# Patient Record
Sex: FEMALE | Race: BLACK OR AFRICAN AMERICAN | Employment: FULL TIME | URBAN - METROPOLITAN AREA
[De-identification: names, ages, dates, MRNs, and addresses within clinical notes are randomized per-mention and may not be internally consistent; named-entity substitution may affect disease eponyms.]

---

## 2017-06-29 ENCOUNTER — HOSPITAL ENCOUNTER (EMERGENCY)
Age: 32
Discharge: HOME | End: 2017-06-29
Attending: EMERGENCY MEDICINE | Admitting: EMERGENCY MEDICINE
Payer: MEDICAID

## 2017-06-29 ENCOUNTER — APPOINTMENT (OUTPATIENT)
Dept: GENERAL RADIOLOGY | Age: 32
End: 2017-06-29
Attending: EMERGENCY MEDICINE
Payer: MEDICAID

## 2017-06-29 VITALS
SYSTOLIC BLOOD PRESSURE: 160 MMHG | DIASTOLIC BLOOD PRESSURE: 92 MMHG | RESPIRATION RATE: 13 BRPM | HEIGHT: 65 IN | OXYGEN SATURATION: 100 % | TEMPERATURE: 98.1 F | WEIGHT: 187 LBS | HEART RATE: 71 BPM | BODY MASS INDEX: 31.16 KG/M2

## 2017-06-29 DIAGNOSIS — R07.9 ACUTE CHEST PAIN: Primary | ICD-10-CM

## 2017-06-29 LAB
ALBUMIN SERPL BCP-MCNC: 4 G/DL (ref 3.5–5)
ALBUMIN/GLOB SERPL: 1 {RATIO} (ref 1.1–2.2)
ALP SERPL-CCNC: 81 U/L (ref 45–117)
ALT SERPL-CCNC: 28 U/L (ref 12–78)
ANION GAP BLD CALC-SCNC: 7 MMOL/L (ref 5–15)
AST SERPL W P-5'-P-CCNC: 14 U/L (ref 15–37)
BASOPHILS # BLD AUTO: 0 K/UL (ref 0–0.1)
BASOPHILS # BLD: 0 % (ref 0–1)
BILIRUB SERPL-MCNC: 0.2 MG/DL (ref 0.2–1)
BUN SERPL-MCNC: 8 MG/DL (ref 6–20)
BUN/CREAT SERPL: 10 (ref 12–20)
CALCIUM SERPL-MCNC: 8.8 MG/DL (ref 8.5–10.1)
CHLORIDE SERPL-SCNC: 106 MMOL/L (ref 97–108)
CO2 SERPL-SCNC: 26 MMOL/L (ref 21–32)
CREAT SERPL-MCNC: 0.8 MG/DL (ref 0.55–1.02)
D DIMER PPP FEU-MCNC: 0.27 MG/L FEU (ref 0–0.65)
EOSINOPHIL # BLD: 0.1 K/UL (ref 0–0.4)
EOSINOPHIL NFR BLD: 2 % (ref 0–7)
ERYTHROCYTE [DISTWIDTH] IN BLOOD BY AUTOMATED COUNT: 14 % (ref 11.5–14.5)
GLOBULIN SER CALC-MCNC: 4.1 G/DL (ref 2–4)
GLUCOSE SERPL-MCNC: 95 MG/DL (ref 65–100)
HCT VFR BLD AUTO: 37.6 % (ref 35–47)
HGB BLD-MCNC: 12.3 G/DL (ref 11.5–16)
LYMPHOCYTES # BLD AUTO: 38 % (ref 12–49)
LYMPHOCYTES # BLD: 1.9 K/UL (ref 0.8–3.5)
MCH RBC QN AUTO: 27.9 PG (ref 26–34)
MCHC RBC AUTO-ENTMCNC: 32.7 G/DL (ref 30–36.5)
MCV RBC AUTO: 85.3 FL (ref 80–99)
MONOCYTES # BLD: 0.4 K/UL (ref 0–1)
MONOCYTES NFR BLD AUTO: 8 % (ref 5–13)
NEUTS SEG # BLD: 2.6 K/UL (ref 1.8–8)
NEUTS SEG NFR BLD AUTO: 52 % (ref 32–75)
PLATELET # BLD AUTO: 249 K/UL (ref 150–400)
POTASSIUM SERPL-SCNC: 3.3 MMOL/L (ref 3.5–5.1)
PROT SERPL-MCNC: 8.1 G/DL (ref 6.4–8.2)
RBC # BLD AUTO: 4.41 M/UL (ref 3.8–5.2)
SODIUM SERPL-SCNC: 139 MMOL/L (ref 136–145)
TROPONIN I SERPL-MCNC: <0.04 NG/ML
WBC # BLD AUTO: 5 K/UL (ref 3.6–11)

## 2017-06-29 PROCEDURE — 93005 ELECTROCARDIOGRAM TRACING: CPT

## 2017-06-29 PROCEDURE — 99285 EMERGENCY DEPT VISIT HI MDM: CPT

## 2017-06-29 PROCEDURE — 36415 COLL VENOUS BLD VENIPUNCTURE: CPT | Performed by: STUDENT IN AN ORGANIZED HEALTH CARE EDUCATION/TRAINING PROGRAM

## 2017-06-29 PROCEDURE — 71020 XR CHEST PA LAT: CPT

## 2017-06-29 PROCEDURE — 85025 COMPLETE CBC W/AUTO DIFF WBC: CPT | Performed by: STUDENT IN AN ORGANIZED HEALTH CARE EDUCATION/TRAINING PROGRAM

## 2017-06-29 PROCEDURE — 84484 ASSAY OF TROPONIN QUANT: CPT | Performed by: STUDENT IN AN ORGANIZED HEALTH CARE EDUCATION/TRAINING PROGRAM

## 2017-06-29 PROCEDURE — 80053 COMPREHEN METABOLIC PANEL: CPT | Performed by: STUDENT IN AN ORGANIZED HEALTH CARE EDUCATION/TRAINING PROGRAM

## 2017-06-29 PROCEDURE — 74011250637 HC RX REV CODE- 250/637: Performed by: EMERGENCY MEDICINE

## 2017-06-29 PROCEDURE — 85379 FIBRIN DEGRADATION QUANT: CPT | Performed by: EMERGENCY MEDICINE

## 2017-06-29 RX ORDER — POTASSIUM CHLORIDE 750 MG/1
40 TABLET, FILM COATED, EXTENDED RELEASE ORAL
Status: COMPLETED | OUTPATIENT
Start: 2017-06-29 | End: 2017-06-29

## 2017-06-29 RX ADMIN — POTASSIUM CHLORIDE 40 MEQ: 750 TABLET, FILM COATED, EXTENDED RELEASE ORAL at 16:26

## 2017-06-29 NOTE — ED NOTES
Bedside and Verbal shift change report given to William Lo RN (oncoming nurse) by Dawn Jacques RN (offgoing nurse). Report included the following information ED Summary.

## 2017-06-29 NOTE — ED PROVIDER NOTES
HPI Comments: 36 y.o. female with past medical history significant for hypertension, edema of the lower extremities, dyspnea on exertion, and bronchitis who presents from home with chief complaint of chest pain. Patient states onset of moderate mid-sternal chest pain that has been constant over the past 2 days. Patient reports this pain is aggravated upon exertion and when taking a deep breath. Patient also complains of accompanying sore throat that results in shortness of breath. Patient mentions hx of chest pain approximately 2 years ago, but it was suspected to be from a panic attack. Patient denies any hx of surgeries and any blood clots. Patient denies any trauma. Patient denies leg swelling and any other sx at this time. There are no other acute medical concerns at this time. Social hx: Tobacco Use: Yes (1/4 pack per day), Alcohol Use: Yes    Note written by Boy Montana, as dictated by Mckenzie Buckley MD 2:31 PM      The history is provided by the patient. Past Medical History:   Diagnosis Date    CERVANTES (dyspnea on exertion) 6/29/2011    Edema of lower extremity 6/29/2011    Hypertension     Respiratory abnormalities     bronchitis       History reviewed. No pertinent surgical history. History reviewed. No pertinent family history. Social History     Social History    Marital status: SINGLE     Spouse name: N/A    Number of children: N/A    Years of education: N/A     Occupational History    Not on file. Social History Main Topics    Smoking status: Current Every Day Smoker     Packs/day: 0.25    Smokeless tobacco: Never Used    Alcohol use Yes      Comment: occasionally    Drug use: No    Sexual activity: Yes     Partners: Male     Other Topics Concern    Not on file     Social History Narrative    ** Merged History Encounter **              ALLERGIES: Review of patient's allergies indicates no known allergies.     Review of Systems   Constitutional: Negative for chills and fever. HENT: Positive for sore throat. Negative for rhinorrhea. Respiratory: Positive for shortness of breath. Negative for cough. Cardiovascular: Positive for chest pain. Negative for leg swelling. Gastrointestinal: Negative for abdominal pain, diarrhea, nausea and vomiting. Genitourinary: Negative for dysuria and urgency. Musculoskeletal: Negative for arthralgias and back pain. Skin: Negative for rash. Neurological: Negative for dizziness, weakness and light-headedness. All other systems reviewed and are negative. Vitals:    06/29/17 1259   BP: (!) 186/124   Pulse: 97   Resp: 20   Temp: 98.1 °F (36.7 °C)   SpO2: 98%   Weight: 84.8 kg (187 lb)   Height: 5' 5\" (1.651 m)            Physical Exam     Const:  No acute distress, well developed, well nourished  Head:  Atraumatic, normocephalic  Eyes:  PERRL, conjunctiva normal, no scleral icterus  Neck:  Supple, trachea midline  Cardiovascular:  RRR, no murmurs, no gallops, no rubs  Resp:  No resp distress, no increased work of breathing, no wheezes, no rhonchi, no rales,  Abd:  Soft, non-tender, non-distended, no rebound, no guarding, no CVA tenderness  :  Deferred  MSK:  No pedal edema, normal ROM  Neuro:  Alert and oriented x3, no cranial nerve defect  Skin:  Warm, dry, intact  Psych: normal mood and affect, behavior is normal, judgement and thought content is normal    Note written by Boy Dejesus, as dictated by Kaela Rogers MD 2:31 PM    MDM  Number of Diagnoses or Management Options  Acute chest pain:      Amount and/or Complexity of Data Reviewed  Clinical lab tests: ordered and reviewed  Tests in the radiology section of CPT®: ordered and reviewed  Review and summarize past medical records: yes    Patient Progress  Patient progress: stable    ED Course     Pt. Presents to the ER with MSK chest pain. She is well appearing in the ER. Pt's sx are atypical for ACS with negative d-dimer.   Negative cardiac enzymes. Pt. To f/u with her PCP or return to the ER with worsening sx.     Procedures

## 2017-06-29 NOTE — DISCHARGE INSTRUCTIONS
Chest Pain: Care Instructions  Your Care Instructions  There are many things that can cause chest pain. Some are not serious and will get better on their own in a few days. But some kinds of chest pain need more testing and treatment. Your doctor may have recommended a follow-up visit in the next 8 to 12 hours. If you are not getting better, you may need more tests or treatment. Even though your doctor has released you, you still need to watch for any problems. The doctor carefully checked you, but sometimes problems can develop later. If you have new symptoms or if your symptoms do not get better, get medical care right away. If you have worse or different chest pain or pressure that lasts more than 5 minutes or you passed out (lost consciousness), call 911 or seek other emergency help right away. A medical visit is only one step in your treatment. Even if you feel better, you still need to do what your doctor recommends, such as going to all suggested follow-up appointments and taking medicines exactly as directed. This will help you recover and help prevent future problems. How can you care for yourself at home? · Rest until you feel better. · Take your medicine exactly as prescribed. Call your doctor if you think you are having a problem with your medicine. · Do not drive after taking a prescription pain medicine. When should you call for help? Call 911 if:  · You passed out (lost consciousness). · You have severe difficulty breathing. · You have symptoms of a heart attack. These may include:  ¨ Chest pain or pressure, or a strange feeling in your chest.  ¨ Sweating. ¨ Shortness of breath. ¨ Nausea or vomiting. ¨ Pain, pressure, or a strange feeling in your back, neck, jaw, or upper belly or in one or both shoulders or arms. ¨ Lightheadedness or sudden weakness. ¨ A fast or irregular heartbeat.   After you call 911, the  may tell you to chew 1 adult-strength or 2 to 4 low-dose aspirin. Wait for an ambulance. Do not try to drive yourself. Call your doctor today if:  · You have any trouble breathing. · Your chest pain gets worse. · You are dizzy or lightheaded, or you feel like you may faint. · You are not getting better as expected. · You are having new or different chest pain. Where can you learn more? Go to http://davonte-tolu.info/. Enter A120 in the search box to learn more about \"Chest Pain: Care Instructions. \"  Current as of: March 20, 2017  Content Version: 11.3  © 9189-1597 [employer]. Care instructions adapted under license by [employer] (which disclaims liability or warranty for this information). If you have questions about a medical condition or this instruction, always ask your healthcare professional. [address] any warranty or liability for your use of this information. We hope that we have addressed all of your medical concerns. The examination and treatment you received in the Emergency Department were for an emergent problem and were not intended as complete care. It is important that you follow up with your healthcare provider(s) for ongoing care. If your symptoms worsen or do not improve as expected, and you are unable to reach your usual health care provider(s), you should return to the Emergency Department. Today's healthcare is undergoing tremendous change, and patient satisfaction surveys are one of the many tools to assess the quality of medical care. You may receive a survey from the [employer] organization regarding your experience in the Emergency Department. I hope that your experience has been completely positive, particularly the medical care that I provided. As such, please participate in the survey; anything less than excellent does not meet my expectations or intentions.         [facility] and 8 [facility] participate in nationally recognized quality of care measures. If your blood pressure is greater than 120/80, as reported below, we urge that you seek medical care to address the potential of high blood pressure, commonly known as hypertension. Hypertension can be hereditary or can be caused by certain medical conditions, pain, stress, or \"white coat syndrome. \"       Please make an appointment with your health care provider(s) for follow up of your Emergency Department visit. VITALS:   Patient Vitals for the past 8 hrs:   Temp Pulse Resp BP SpO2   06/29/17 1530 - 64 15 (!) 175/102 100 %   06/29/17 1506 - 66 16 (!) 172/119 100 %   06/29/17 1500 - 67 16 (!) 177/109 100 %   06/29/17 1259 98.1 °F (36.7 °C) 97 20 (!) 186/124 98 %          Thank you for allowing us to provide you with medical care today. We realize that you have many choices for your emergency care needs. Please choose us in the future for any continued health care needs. Charly Burden [address].   Office: [phone]            Recent Results (from the past 24 hour(s))   EKG, 12 LEAD, INITIAL    Collection Time: 06/29/17  1:04 PM   Result Value Ref Range    Ventricular Rate 93 BPM    Atrial Rate 93 BPM    P-R Interval 226 ms    QRS Duration 84 ms    Q-T Interval 356 ms    QTC Calculation (Bezet) 442 ms    Calculated P Axis 52 degrees    Calculated R Axis 57 degrees    Calculated T Axis 38 degrees    Diagnosis       Sinus rhythm with 1st degree AV block  Biatrial enlargement  When compared with ECG of 29-JUL-2010 18:12,  No significant change was found     CBC WITH AUTOMATED DIFF    Collection Time: 06/29/17  1:07 PM   Result Value Ref Range    WBC 5.0 3.6 - 11.0 K/uL    RBC 4.41 3.80 - 5.20 M/uL    HGB 12.3 11.5 - 16.0 g/dL    HCT 37.6 35.0 - 47.0 %    MCV 85.3 80.0 - 99.0 FL    MCH 27.9 26.0 - 34.0 PG    MCHC 32.7 30.0 - 36.5 g/dL    RDW 14.0 11.5 - 14.5 %    PLATELET 577 640 - 137 K/uL NEUTROPHILS 52 32 - 75 %    LYMPHOCYTES 38 12 - 49 %    MONOCYTES 8 5 - 13 %    EOSINOPHILS 2 0 - 7 %    BASOPHILS 0 0 - 1 %    ABS. NEUTROPHILS 2.6 1.8 - 8.0 K/UL    ABS. LYMPHOCYTES 1.9 0.8 - 3.5 K/UL    ABS. MONOCYTES 0.4 0.0 - 1.0 K/UL    ABS. EOSINOPHILS 0.1 0.0 - 0.4 K/UL    ABS. BASOPHILS 0.0 0.0 - 0.1 K/UL   METABOLIC PANEL, COMPREHENSIVE    Collection Time: 06/29/17  1:07 PM   Result Value Ref Range    Sodium 139 136 - 145 mmol/L    Potassium 3.3 (L) 3.5 - 5.1 mmol/L    Chloride 106 97 - 108 mmol/L    CO2 26 21 - 32 mmol/L    Anion gap 7 5 - 15 mmol/L    Glucose 95 65 - 100 mg/dL    BUN 8 6 - 20 MG/DL    Creatinine 0.80 0.55 - 1.02 MG/DL    BUN/Creatinine ratio 10 (L) 12 - 20      GFR est AA >60 >60 ml/min/1.73m2    GFR est non-AA >60 >60 ml/min/1.73m2    Calcium 8.8 8.5 - 10.1 MG/DL    Bilirubin, total 0.2 0.2 - 1.0 MG/DL    ALT (SGPT) 28 12 - 78 U/L    AST (SGOT) 14 (L) 15 - 37 U/L    Alk. phosphatase 81 45 - 117 U/L    Protein, total 8.1 6.4 - 8.2 g/dL    Albumin 4.0 3.5 - 5.0 g/dL    Globulin 4.1 (H) 2.0 - 4.0 g/dL    A-G Ratio 1.0 (L) 1.1 - 2.2     TROPONIN I    Collection Time: 06/29/17  1:07 PM   Result Value Ref Range    Troponin-I, Qt. <0.04 <0.05 ng/mL   D DIMER    Collection Time: 06/29/17  1:07 PM   Result Value Ref Range    D-dimer 0.27 0.00 - 0.65 mg/L FEU       Xr Chest Pa Lat    Result Date: 6/29/2017  PA AND LATERAL CHEST RADIOGRAPHS: 6/29/2017 2:42 PM INDICATION: Sternal chest pain and dyspnea for 2 days. COMPARISON: 6/14/2014, 7/29/2010. TECHNIQUE: Frontal and lateral radiographs of the chest. FINDINGS: The lungs are clear. The central airways are patent. The heart size is normal. No pneumothorax or pleural effusion. IMPRESSION: Clear lungs.

## 2017-06-29 NOTE — ED TRIAGE NOTES
Pt arrives from home c/o sternal chest pain and SOB x2 days. Pt states that her chest hurts and she feels like her throat is closing when she tries to take a deep breath. Pt denies cough. /124.

## 2017-06-29 NOTE — PROGRESS NOTES
Admission Medication Reconciliation:    Information obtained from: patient     Significant PMH/Disease States:   Past Medical History:   Diagnosis Date    CERVANTES (dyspnea on exertion) 6/29/2011    Edema of lower extremity 6/29/2011    Hypertension     Respiratory abnormalities     bronchitis       Chief Complaint for this Admission:  SOB     Allergies:  Review of patient's allergies indicates no known allergies. Prior to Admission Medications:   None         Comments/Recommendations: Patient denies regularly taking any prescription or non-prescription medications prior to admission.

## 2017-06-29 NOTE — LETTER
NOTIFICATION RETURN TO WORK / SCHOOL 
 
6/29/2017 4:32 PM 
 
Ms. Ross Braden [address] 
[address] [facility] To Whom It May Concern: 
 
Ross Braden is currently under the care of [address] DEP. She will return to work/school on: 6/30/2017. If there are questions or concerns please have the patient contact our office. Sincerely, No name on file.

## 2017-06-29 NOTE — ED NOTES
Patient is ambulatory to the restroom, she will be given oral potassium and then discharged. Patient has verbalized the discharged plan.

## 2017-06-30 LAB
ATRIAL RATE: 93 BPM
CALCULATED P AXIS, ECG09: 52 DEGREES
CALCULATED R AXIS, ECG10: 57 DEGREES
CALCULATED T AXIS, ECG11: 38 DEGREES
DIAGNOSIS, 93000: NORMAL
P-R INTERVAL, ECG05: 226 MS
Q-T INTERVAL, ECG07: 356 MS
QRS DURATION, ECG06: 84 MS
QTC CALCULATION (BEZET), ECG08: 442 MS
VENTRICULAR RATE, ECG03: 93 BPM

## 2017-10-02 ENCOUNTER — OFFICE VISIT (OUTPATIENT)
Dept: INTERNAL MEDICINE CLINIC | Age: 32
End: 2017-10-02

## 2017-10-02 VITALS
WEIGHT: 243.8 LBS | HEART RATE: 78 BPM | OXYGEN SATURATION: 99 % | HEIGHT: 65 IN | BODY MASS INDEX: 40.62 KG/M2 | SYSTOLIC BLOOD PRESSURE: 158 MMHG | TEMPERATURE: 98.3 F | DIASTOLIC BLOOD PRESSURE: 106 MMHG | RESPIRATION RATE: 18 BRPM

## 2017-10-02 DIAGNOSIS — I10 ESSENTIAL HYPERTENSION: ICD-10-CM

## 2017-10-02 DIAGNOSIS — E66.01 MORBID OBESITY (HCC): ICD-10-CM

## 2017-10-02 DIAGNOSIS — Z00.00 WELL ADULT EXAM: Primary | ICD-10-CM

## 2017-10-02 RX ORDER — CHLORTHALIDONE 25 MG/1
25 TABLET ORAL DAILY
Qty: 60 TAB | Refills: 3 | Status: SHIPPED | OUTPATIENT
Start: 2017-10-02 | End: 2017-12-26

## 2017-10-02 NOTE — MR AVS SNAPSHOT
Visit Information Date & Time Provider Department Dept. Phone Encounter #  
 10/2/2017  2:00 PM P[facility]ja [facility], [address] [id number] Follow-up Instructions Return in about 6 weeks (around 11/13/2017). Upcoming Health Maintenance Date Due Pneumococcal 19-64 Medium Risk (1 of 1 - PPSV23) 8/16/2006 DTaP/Tdap/Td series (1 - Tdap) 8/16/2008 PAP AKA CERVICAL CYTOLOGY 1/9/2020 Allergies as of 10/2/2017  Review Complete On: 10/2/2017 By: Neda Liang LPN No Known Allergies Current Immunizations  Never Reviewed No immunizations on file. Not reviewed this visit You Were Diagnosed With   
  
 Codes Comments Well adult exam    -  Primary ICD-10-CM: Z00.00 ICD-9-CM: V70.0 Essential hypertension     ICD-10-CM: I10 
ICD-9-CM: 401.9 Morbid obesity ([address]     ICD-10-CM: E66.01 
ICD-9-CM: 278.01 Vitals BP Pulse Temp Resp Height(growth percentile) Weight(growth percentile) (!) 158/106 (BP 1 Location: Left arm, BP Patient Position: Sitting) 78 98.3 °F (36.8 °C) (Oral) 18 5' 5\" (1.651 m) 243 lb 12.8 oz (110.6 kg) SpO2 BMI OB Status Smoking Status 99% 40.57 kg/m2 Implant Current Every Day Smoker Vitals History BMI and BSA Data Body Mass Index Body Surface Area 40.57 kg/m 2 2.25 m 2 Preferred Pharmacy Pharmacy Name Phone [email or web address], [address] [phone] Your Updated Medication List  
  
   
This list is accurate as of: 10/2/17  3:00 PM.  Always use your most recent med list.  
  
  
  
  
 chlorthalidone 25 mg tablet Commonly known as:  Ju [place] Take 1 Tab by mouth daily. Prescriptions Sent to Pharmacy Refills  
 chlorthalidone (HYGROTEN) 25 mg tablet 3 Sig: Take 1 Tab by mouth daily.   
 Class: Normal  
 Pharmacy: [email or web address], [address]  #: [phone] Route: Oral  
  
We Performed the Following CBC W/O DIFF [06078 CPT(R)] CHLAMYDIA/GC PCR [77459 CPT(R)] HEPATITIS C AB [26505 CPT(R)] HIV 1/2 AG/AB, 4TH GENERATION,W RFLX CONFIRM [[id number] Custom] LIPID PANEL [21427 CPT(R)] METABOLIC PANEL, COMPREHENSIVE [29610 CPT(R)] TSH 3RD GENERATION [01709 CPT(R)] Follow-up Instructions Return in about 6 weeks (around 11/13/2017). Patient Instructions Low Sodium Diet (2,000 Milligram): Care Instructions Your Care Instructions Too much sodium causes your body to hold on to extra water. This can raise your blood pressure and force your heart and kidneys to work harder. In very serious cases, this could cause you to be put in the hospital. It might even be life-threatening. By limiting sodium, you will feel better and lower your risk of serious problems. The most common source of sodium is salt. People get most of the salt in their diet from canned, prepared, and packaged foods. Fast food and restaurant meals also are very high in sodium. Your doctor will probably limit your sodium to less than 2,000 milligrams (mg) a day. This limit counts all the sodium in prepared and packaged foods and any salt you add to your food. Follow-up care is a key part of your treatment and safety. Be sure to make and go to all appointments, and call your doctor if you are having problems. It's also a good idea to know your test results and keep a list of the medicines you take. How can you care for yourself at home? Read food labels · Read labels on cans and food packages. The labels tell you how much sodium is in each serving. Make sure that you look at the serving size. If you eat more than the serving size, you have eaten more sodium. · Food labels also tell you the Percent Daily Value for sodium. Choose products with low Percent Daily Values for sodium. · Be aware that sodium can come in forms other than salt, including monosodium glutamate (MSG), sodium citrate, and sodium bicarbonate (baking soda). MSG is often added to Asian food. When you eat out, you can sometimes ask for food without MSG or added salt. Buy low-sodium foods · Buy foods that are labeled \"unsalted\" (no salt added), \"sodium-free\" (less than 5 mg of sodium per serving), or \"low-sodium\" (less than 140 mg of sodium per serving). Foods labeled \"reduced-sodium\" and \"light sodium\" may still have too much sodium. Be sure to read the label to see how much sodium you are getting. · Buy fresh vegetables, or frozen vegetables without added sauces. Buy low-sodium versions of canned vegetables, soups, and other canned goods. Prepare low-sodium meals · Cut back on the amount of salt you use in cooking. This will help you adjust to the taste. Do not add salt after cooking. One teaspoon of salt has about 2,300 mg of sodium. · Take the salt shaker off the table. · Flavor your food with garlic, lemon juice, onion, vinegar, herbs, and spices. Do not use soy sauce, lite soy sauce, steak sauce, onion salt, garlic salt, celery salt, mustard, or ketchup on your food. · Use low-sodium salad dressings, sauces, and ketchup. Or make your own salad dressings and sauces without adding salt. · Use less salt (or none) when recipes call for it. You can often use half the salt a recipe calls for without losing flavor. Other foods such as rice, pasta, and grains do not need added salt. · Rinse canned vegetables, and cook them in fresh water. This removes somebut not allof the salt. · Avoid water that is naturally high in sodium or that has been treated with water softeners, which add sodium. Call your local water company to find out the sodium content of your water supply. If you buy bottled water, read the label and choose a sodium-free brand. Avoid high-sodium foods · Avoid eating: ¨ Smoked, cured, salted, and canned meat, fish, and poultry. ¨ Ham, serra, hot dogs, and luncheon meats. ¨ Regular, hard, and processed cheese and regular peanut butter. ¨ Crackers with salted tops, and other salted snack foods such as pretzels, chips, and salted popcorn. ¨ Frozen prepared meals, unless labeled low-sodium. ¨ Canned and dried soups, broths, and bouillon, unless labeled sodium-free or low-sodium. ¨ Canned vegetables, unless labeled sodium-free or low-sodium. ¨ [place] fries, pizza, tacos, and other fast foods. ¨ Pickles, olives, ketchup, and other condiments, especially soy sauce, unless labeled sodium-free or low-sodium. Where can you learn more? Go to http://davonte-tolu.info/. Enter Y178 in the search box to learn more about \"Low Sodium Diet (2,000 Milligram): Care Instructions. \" Current as of: July 26, 2016 Content Version: 11.3 © 3025-5961 [employer]. Care instructions adapted under license by [employer] (which disclaims liability or warranty for this information). If you have questions about a medical condition or this instruction, always ask your healthcare professional. [place] [address] any warranty or liability for your use of this information. Introducing [facility]! Delmar Horne introduces [employer] patient portal. Now you can access parts of your medical record, email your doctor's office, and request medication refills online. 1. In your internet browser, go to [email or web address] 2. Click on the First Time User? Click Here link in the Sign In box. You will see the New Member Sign Up page. 3. Enter your [employer] Access Code exactly as it appears below. You will not need to use this code after youve completed the sign-up process. If you do not sign up before the expiration date, you must request a new code.  
 
· [employer] Access Code: [email or web address] 
 Expires: 12/31/2017  2:14 PM 
 
4. Enter the last four digits of your Social Security Number (xxxx) and Date of Birth (mm/dd/yyyy) as indicated and click Submit. You will be taken to the next sign-up page. 5. Create a [employer] ID. This will be your [employer] login ID and cannot be changed, so think of one that is secure and easy to remember. 6. Create a [employer] password. You can change your password at any time. 7. Enter your Password Reset Question and Answer. This can be used at a later time if you forget your password. 8. Enter your e-mail address. You will receive e-mail notification when new information is available in [address] 9. Click Sign Up. You can now view and download portions of your medical record. 10. Click the Download Summary menu link to download a portable copy of your medical information. If you have questions, please visit the Frequently Asked Questions section of the [employer] website. Remember, [employer] is NOT to be used for urgent needs. For medical emergencies, dial 911. Now available from your iPhone and Android! Please provide this summary of care documentation to your next provider. Your primary care clinician is listed as Denise Maxwell. If you have any questions after today's visit, please call [phone].

## 2017-10-02 NOTE — PROGRESS NOTES
1. Have you been to the ER, urgent care clinic since your last visit? Hospitalized since your last visit? Yes When: 6/29/17 [facility] ED S. O.B /chest pain. 2. Have you seen or consulted any other health care providers outside of the [facility] since your last visit? Include any pap smears or colon screening.  No.  PHQ over the last two weeks 10/2/2017   Little interest or pleasure in doing things Not at all   Feeling down, depressed or hopeless Not at all   Total Score PHQ 2 0

## 2017-10-02 NOTE — PATIENT INSTRUCTIONS

## 2017-10-02 NOTE — PROGRESS NOTES
Byron Bennett is a 29 y.o. female and presents with New Patient; Thyroid Problem; and Hypertension  . Subjective:    Hypertension Review:  The patient has essential hypertension  Diet and Lifestyle: generally does not follow a  low sodium diet, exercises never  Home BP Monitoring: is not measured at home. Pertinent ROS:not taking medications as instructed, pt stopped her losartan ~ 3 mths ago bc it made her tired. +FH HTN    Review of Systems  Constitutional: negative for fevers, chills, anorexia and weight loss  Eyes:   negative for visual disturbance and irritation  ENT:   negative for tinnitus,sore throat,nasal congestion,ear pains. hoarseness  Respiratory:  negative for cough, hemoptysis, dyspnea,wheezing  CV:   negative for chest pain, palpitations, lower extremity edema  GI:   negative for nausea, vomiting, diarrhea, abdominal pain,melena  Genitourinary: negative for frequency, dysuria and hematuria  Integument:  negative for rash and pruritus  Musculoskel: negative for myalgias, arthralgias, back pain, muscle weakness, joint pain  Neurological:  negative for headaches, dizziness, vertigo, memory problems and gait   Behavl/Psych: negative for feelings of anxiety, depression, mood changes    Past Medical History:   Diagnosis Date    CERVANTES (dyspnea on exertion) 6/29/2011    Edema of lower extremity 6/29/2011    Hypertension     Respiratory abnormalities     bronchitis     History reviewed. No pertinent surgical history.   Social History     Social History    Marital status: SINGLE     Spouse name: N/A    Number of children: N/A    Years of education: N/A     Social History Main Topics    Smoking status: Current Every Day Smoker     Packs/day: 0.25    Smokeless tobacco: Never Used    Alcohol use Yes      Comment: occasionally    Drug use: No    Sexual activity: Yes     Partners: Male     Other Topics Concern    None     Social History Narrative    ** Merged History Encounter **          History reviewed. No pertinent family history. No Known Allergies    Objective:  Visit Vitals    BP (!) 158/106 (BP 1 Location: Left arm, BP Patient Position: Sitting)    Pulse 78    Temp 98.3 °F (36.8 °C) (Oral)    Resp 18    Ht 5' 5\" (1.651 m)    Wt 243 lb 12.8 oz (110.6 kg)    SpO2 99%    BMI 40.57 kg/m2     Physical Exam:   General appearance - alert, well appearing, and in no distress  Mental status - alert, oriented to person, place, and time  EYE-EOMI  Chest - clear to auscultation, no wheezes, rales or rhonchi, symmetric air entry   Heart - normal rate, regular rhythm, normal S1, S2, no murmurs, rubs, clicks or gallops   Abdomen - obese soft, nontender, nondistended, no masses or organomegaly  Ext-peripheral pulses normal, no pedal edema, no clubbing or cyanosis  Skin-Warm and dry. no hyperpigmentation, vitiligo, or suspicious lesions  Neuro -alert, oriented, normal speech, no focal findings or movement disorder noted        Results for orders placed or performed during the hospital encounter of 06/29/17   CBC WITH AUTOMATED DIFF   Result Value Ref Range    WBC 5.0 3.6 - 11.0 K/uL    RBC 4.41 3.80 - 5.20 M/uL    HGB 12.3 11.5 - 16.0 g/dL    HCT 37.6 35.0 - 47.0 %    MCV 85.3 80.0 - 99.0 FL    MCH 27.9 26.0 - 34.0 PG    MCHC 32.7 30.0 - 36.5 g/dL    RDW 14.0 11.5 - 14.5 %    PLATELET 491 229 - 119 K/uL    NEUTROPHILS 52 32 - 75 %    LYMPHOCYTES 38 12 - 49 %    MONOCYTES 8 5 - 13 %    EOSINOPHILS 2 0 - 7 %    BASOPHILS 0 0 - 1 %    ABS. NEUTROPHILS 2.6 1.8 - 8.0 K/UL    ABS. LYMPHOCYTES 1.9 0.8 - 3.5 K/UL    ABS. MONOCYTES 0.4 0.0 - 1.0 K/UL    ABS. EOSINOPHILS 0.1 0.0 - 0.4 K/UL    ABS.  BASOPHILS 0.0 0.0 - 0.1 K/UL   METABOLIC PANEL, COMPREHENSIVE   Result Value Ref Range    Sodium 139 136 - 145 mmol/L    Potassium 3.3 (L) 3.5 - 5.1 mmol/L    Chloride 106 97 - 108 mmol/L    CO2 26 21 - 32 mmol/L    Anion gap 7 5 - 15 mmol/L    Glucose 95 65 - 100 mg/dL    BUN 8 6 - 20 MG/DL    Creatinine 0.80 0.55 - 1.02 MG/DL    BUN/Creatinine ratio 10 (L) 12 - 20      GFR est AA >60 >60 ml/min/1.73m2    GFR est non-AA >60 >60 ml/min/1.73m2    Calcium 8.8 8.5 - 10.1 MG/DL    Bilirubin, total 0.2 0.2 - 1.0 MG/DL    ALT (SGPT) 28 12 - 78 U/L    AST (SGOT) 14 (L) 15 - 37 U/L    Alk. phosphatase 81 45 - 117 U/L    Protein, total 8.1 6.4 - 8.2 g/dL    Albumin 4.0 3.5 - 5.0 g/dL    Globulin 4.1 (H) 2.0 - 4.0 g/dL    A-G Ratio 1.0 (L) 1.1 - 2.2     TROPONIN I   Result Value Ref Range    Troponin-I, Qt. <0.04 <0.05 ng/mL   D DIMER   Result Value Ref Range    D-dimer 0.27 0.00 - 0.65 mg/L FEU   EKG, 12 LEAD, INITIAL   Result Value Ref Range    Ventricular Rate 93 BPM    Atrial Rate 93 BPM    P-R Interval 226 ms    QRS Duration 84 ms    Q-T Interval 356 ms    QTC Calculation (Bezet) 442 ms    Calculated P Axis 52 degrees    Calculated R Axis 57 degrees    Calculated T Axis 38 degrees    Diagnosis       Sinus rhythm with 1st degree AV block  Biatrial enlargement  When compared with ECG of 29-JUL-2010 18:12,  No significant change was found  Confirmed by Jeannette Stanton MD, Ashli Maldonado ([id number]) on 6/30/2017 2:28:04 PM         Assessment/Plan:    ICD-10-CM ICD-9-CM    1. Well adult exam Z00.00 V70.0 CBC W/O DIFF      CHLAMYDIA/GC PCR      HEPATITIS C AB      TSH 3RD GENERATION      METABOLIC PANEL, COMPREHENSIVE      LIPID PANEL      HIV 1/2 AG/AB, 4TH GENERATION,W RFLX CONFIRM   2. Essential hypertension I10 401.9 CBC W/O DIFF      CHLAMYDIA/GC PCR      HEPATITIS C AB      TSH 3RD GENERATION      METABOLIC PANEL, COMPREHENSIVE      LIPID PANEL      HIV 1/2 AG/AB, 4TH GENERATION,W RFLX CONFIRM   3.  Morbid obesity ([facility] [address] E66.01 278.01      Orders Placed This Encounter    CBC W/O DIFF    CHLAMYDIA/GC PCR     Order Specific Question:   Sample type     Answer:   Urine [258]     Order Specific Question:   Specimen source     Answer:   Urine [258]    HEPATITIS C AB    TSH 3RD GENERATION    METABOLIC PANEL, COMPREHENSIVE    LIPID PANEL    HIV 1/2 AG/AB, 4TH GENERATION,W RFLX CONFIRM    chlorthalidone (HYGROTEN) 25 mg tablet     Sig: Take 1 Tab by mouth daily. Dispense:  60 Tab     Refill:  3     lose weight, increase physical activity, follow low salt diet, routine labs ordered  Patient Instructions        Low Sodium Diet (2,000 Milligram): Care Instructions  Your Care Instructions  Too much sodium causes your body to hold on to extra water. This can raise your blood pressure and force your heart and kidneys to work harder. In very serious cases, this could cause you to be put in the hospital. It might even be life-threatening. By limiting sodium, you will feel better and lower your risk of serious problems. The most common source of sodium is salt. People get most of the salt in their diet from canned, prepared, and packaged foods. Fast food and restaurant meals also are very high in sodium. Your doctor will probably limit your sodium to less than 2,000 milligrams (mg) a day. This limit counts all the sodium in prepared and packaged foods and any salt you add to your food. Follow-up care is a key part of your treatment and safety. Be sure to make and go to all appointments, and call your doctor if you are having problems. It's also a good idea to know your test results and keep a list of the medicines you take. How can you care for yourself at home? Read food labels  · Read labels on cans and food packages. The labels tell you how much sodium is in each serving. Make sure that you look at the serving size. If you eat more than the serving size, you have eaten more sodium. · Food labels also tell you the Percent Daily Value for sodium. Choose products with low Percent Daily Values for sodium. · Be aware that sodium can come in forms other than salt, including monosodium glutamate (MSG), sodium citrate, and sodium bicarbonate (baking soda). MSG is often added to Asian food. When you eat out, you can sometimes ask for food without MSG or added salt.   Buy low-sodium foods  · Buy foods that are labeled \"unsalted\" (no salt added), \"sodium-free\" (less than 5 mg of sodium per serving), or \"low-sodium\" (less than 140 mg of sodium per serving). Foods labeled \"reduced-sodium\" and \"light sodium\" may still have too much sodium. Be sure to read the label to see how much sodium you are getting. · Buy fresh vegetables, or frozen vegetables without added sauces. Buy low-sodium versions of canned vegetables, soups, and other canned goods. Prepare low-sodium meals  · Cut back on the amount of salt you use in cooking. This will help you adjust to the taste. Do not add salt after cooking. One teaspoon of salt has about 2,300 mg of sodium. · Take the salt shaker off the table. · Flavor your food with garlic, lemon juice, onion, vinegar, herbs, and spices. Do not use soy sauce, lite soy sauce, steak sauce, onion salt, garlic salt, celery salt, mustard, or ketchup on your food. · Use low-sodium salad dressings, sauces, and ketchup. Or make your own salad dressings and sauces without adding salt. · Use less salt (or none) when recipes call for it. You can often use half the salt a recipe calls for without losing flavor. Other foods such as rice, pasta, and grains do not need added salt. · Rinse canned vegetables, and cook them in fresh water. This removes some--but not all--of the salt. · Avoid water that is naturally high in sodium or that has been treated with water softeners, which add sodium. Call your local water company to find out the sodium content of your water supply. If you buy bottled water, read the label and choose a sodium-free brand. Avoid high-sodium foods  · Avoid eating:  ¨ Smoked, cured, salted, and canned meat, fish, and poultry. ¨ Ham, serra, hot dogs, and luncheon meats. ¨ Regular, hard, and processed cheese and regular peanut butter. ¨ Crackers with salted tops, and other salted snack foods such as pretzels, chips, and salted popcorn.   ¨ Frozen prepared meals, unless labeled low-sodium. ¨ Canned and dried soups, broths, and bouillon, unless labeled sodium-free or low-sodium. ¨ Canned vegetables, unless labeled sodium-free or low-sodium. ¨ [place] fries, pizza, tacos, and other fast foods. ¨ Pickles, olives, ketchup, and other condiments, especially soy sauce, unless labeled sodium-free or low-sodium. Where can you learn more? Go to http://davonte-tolu.info/. Enter H650 in the search box to learn more about \"Low Sodium Diet (2,000 Milligram): Care Instructions. \"  Current as of: July 26, 2016  Content Version: 11.3  © 6391-4771 [employer]. Care instructions adapted under license by [employer] (which disclaims liability or warranty for this information). If you have questions about a medical condition or this instruction, always ask your healthcare professional. [place] [address] any warranty or liability for your use of this information. Follow-up Disposition:  Return in about 6 weeks (around 11/13/2017). I have reviewed with the patient details of the assessment and plan and all questions were answered. Relevent patient education was performed. The most recent lab findings were reviewed with the patient. An After Visit Summary was printed and given to the patient.

## 2017-12-26 ENCOUNTER — HOSPITAL ENCOUNTER (EMERGENCY)
Age: 32
Discharge: HOME | End: 2017-12-26
Attending: EMERGENCY MEDICINE
Payer: SELF-PAY

## 2017-12-26 ENCOUNTER — APPOINTMENT (OUTPATIENT)
Dept: GENERAL RADIOLOGY | Age: 32
End: 2017-12-26
Attending: EMERGENCY MEDICINE
Payer: SELF-PAY

## 2017-12-26 VITALS
RESPIRATION RATE: 16 BRPM | SYSTOLIC BLOOD PRESSURE: 159 MMHG | WEIGHT: 230 LBS | BODY MASS INDEX: 38.32 KG/M2 | OXYGEN SATURATION: 100 % | TEMPERATURE: 98.1 F | DIASTOLIC BLOOD PRESSURE: 80 MMHG | HEART RATE: 71 BPM | HEIGHT: 65 IN

## 2017-12-26 DIAGNOSIS — R07.89 CHEST WALL PAIN: ICD-10-CM

## 2017-12-26 DIAGNOSIS — I10 HYPERTENSION, UNSPECIFIED TYPE: Primary | ICD-10-CM

## 2017-12-26 DIAGNOSIS — R05.9 COUGH: ICD-10-CM

## 2017-12-26 LAB
ALBUMIN SERPL-MCNC: 3.7 G/DL (ref 3.5–5)
ALBUMIN/GLOB SERPL: 0.9 {RATIO} (ref 1.1–2.2)
ALP SERPL-CCNC: 75 U/L (ref 45–117)
ALT SERPL-CCNC: 35 U/L (ref 12–78)
ANION GAP SERPL CALC-SCNC: 7 MMOL/L (ref 5–15)
AST SERPL-CCNC: 20 U/L (ref 15–37)
BASOPHILS # BLD: 0 K/UL (ref 0–0.1)
BASOPHILS NFR BLD: 0 % (ref 0–1)
BILIRUB SERPL-MCNC: 0.1 MG/DL (ref 0.2–1)
BNP SERPL-MCNC: 52 PG/ML (ref 0–125)
BUN SERPL-MCNC: 15 MG/DL (ref 6–20)
BUN/CREAT SERPL: 19 (ref 12–20)
CALCIUM SERPL-MCNC: 9.2 MG/DL (ref 8.5–10.1)
CHLORIDE SERPL-SCNC: 105 MMOL/L (ref 97–108)
CO2 SERPL-SCNC: 28 MMOL/L (ref 21–32)
CREAT SERPL-MCNC: 0.78 MG/DL (ref 0.55–1.02)
EOSINOPHIL # BLD: 0.1 K/UL (ref 0–0.4)
EOSINOPHIL NFR BLD: 2 % (ref 0–7)
ERYTHROCYTE [DISTWIDTH] IN BLOOD BY AUTOMATED COUNT: 13.3 % (ref 11.5–14.5)
GLOBULIN SER CALC-MCNC: 4.2 G/DL (ref 2–4)
GLUCOSE SERPL-MCNC: 90 MG/DL (ref 65–100)
HCT VFR BLD AUTO: 38.1 % (ref 35–47)
HGB BLD-MCNC: 12.2 G/DL (ref 11.5–16)
LYMPHOCYTES # BLD: 2.3 K/UL (ref 0.8–3.5)
LYMPHOCYTES NFR BLD: 39 % (ref 12–49)
MCH RBC QN AUTO: 27.1 PG (ref 26–34)
MCHC RBC AUTO-ENTMCNC: 32 G/DL (ref 30–36.5)
MCV RBC AUTO: 84.7 FL (ref 80–99)
MONOCYTES # BLD: 0.3 K/UL (ref 0–1)
MONOCYTES NFR BLD: 5 % (ref 5–13)
NEUTS SEG # BLD: 3.2 K/UL (ref 1.8–8)
NEUTS SEG NFR BLD: 54 % (ref 32–75)
PLATELET # BLD AUTO: 246 K/UL (ref 150–400)
POTASSIUM SERPL-SCNC: 3.6 MMOL/L (ref 3.5–5.1)
PROT SERPL-MCNC: 7.9 G/DL (ref 6.4–8.2)
RBC # BLD AUTO: 4.5 M/UL (ref 3.8–5.2)
SODIUM SERPL-SCNC: 140 MMOL/L (ref 136–145)
TROPONIN I SERPL-MCNC: <0.04 NG/ML
WBC # BLD AUTO: 5.9 K/UL (ref 3.6–11)

## 2017-12-26 PROCEDURE — 74011250636 HC RX REV CODE- 250/636: Performed by: EMERGENCY MEDICINE

## 2017-12-26 PROCEDURE — 85025 COMPLETE CBC W/AUTO DIFF WBC: CPT | Performed by: EMERGENCY MEDICINE

## 2017-12-26 PROCEDURE — 96374 THER/PROPH/DIAG INJ IV PUSH: CPT

## 2017-12-26 PROCEDURE — 36415 COLL VENOUS BLD VENIPUNCTURE: CPT | Performed by: EMERGENCY MEDICINE

## 2017-12-26 PROCEDURE — 80053 COMPREHEN METABOLIC PANEL: CPT | Performed by: EMERGENCY MEDICINE

## 2017-12-26 PROCEDURE — 71020 XR CHEST PA LAT: CPT

## 2017-12-26 PROCEDURE — 83880 ASSAY OF NATRIURETIC PEPTIDE: CPT | Performed by: EMERGENCY MEDICINE

## 2017-12-26 PROCEDURE — 84484 ASSAY OF TROPONIN QUANT: CPT | Performed by: EMERGENCY MEDICINE

## 2017-12-26 PROCEDURE — 99283 EMERGENCY DEPT VISIT LOW MDM: CPT

## 2017-12-26 RX ORDER — HYDRALAZINE HYDROCHLORIDE 20 MG/ML
20 INJECTION INTRAMUSCULAR; INTRAVENOUS
Status: COMPLETED | OUTPATIENT
Start: 2017-12-26 | End: 2017-12-26

## 2017-12-26 RX ORDER — CHLORTHALIDONE 25 MG/1
25 TABLET ORAL DAILY
Qty: 60 TAB | Refills: 3 | Status: SHIPPED | OUTPATIENT
Start: 2017-12-26 | End: 2019-05-17 | Stop reason: SDUPTHER

## 2017-12-26 RX ORDER — POTASSIUM CHLORIDE 20 MEQ/1
20 TABLET, EXTENDED RELEASE ORAL DAILY
Qty: 30 TAB | Refills: 0 | Status: SHIPPED | OUTPATIENT
Start: 2017-12-26 | End: 2018-01-25

## 2017-12-26 RX ADMIN — HYDRALAZINE HYDROCHLORIDE 20 MG: 20 INJECTION INTRAMUSCULAR; INTRAVENOUS at 16:13

## 2017-12-26 NOTE — ED NOTES
I have reviewed discharge instructions with the patient. The patient verbalized understanding. Time allotted for questions. VSS. Pt ambualtory out of unit.

## 2017-12-26 NOTE — ED TRIAGE NOTES
Pt reports nonproductive cough for the past 3 weeks. Pt she has not taken anything for her symptoms.

## 2017-12-26 NOTE — ED PROVIDER NOTES
HPI Comments: 29 y.o. female with past medical history significant for HTN, dyspnea on exertion, edema of lower extremity and bronchitis who presents ambulatory from home with chief complaint of cough. Pt reports 3-week history of intermittent upper chest pains. Pt states pain is exertional in nature. Pt states pain has been constant today. Pt also reports 2-week history of cough and dyspnea on exertion. Pt elicits history of HTN, but has not been compliant with prescribed antihypertensives. Pt states last dose was approximately 1 week ago. There are no other acute medical concerns at this time. Social hx: Current every day tobacco smoker; Occasional EtOH use; Denies illicit drug use  PCP: Carolina Martinez MD    Note written by Boy Anderson, as dictated by Orlando Macdonald MD 3:09 PM        The history is provided by the patient. No  was used. Past Medical History:   Diagnosis Date    CERVANTES (dyspnea on exertion) 6/29/2011    Edema of lower extremity 6/29/2011    Hypertension     Respiratory abnormalities     bronchitis       History reviewed. No pertinent surgical history. History reviewed. No pertinent family history. Social History     Social History    Marital status: SINGLE     Spouse name: N/A    Number of children: N/A    Years of education: N/A     Occupational History    Not on file. Social History Main Topics    Smoking status: Current Every Day Smoker     Packs/day: 0.25    Smokeless tobacco: Never Used    Alcohol use Yes      Comment: occasionally    Drug use: No    Sexual activity: Yes     Partners: Male     Other Topics Concern    Not on file     Social History Narrative    ** Merged History Encounter **              ALLERGIES: Review of patient's allergies indicates no known allergies. Review of Systems   Constitutional: Negative for appetite change, chills and fever. HENT: Negative for rhinorrhea, sore throat and trouble swallowing. Eyes: Negative for photophobia. Respiratory: Positive for cough and shortness of breath. Cardiovascular: Positive for chest pain. Negative for palpitations. Gastrointestinal: Negative for abdominal pain, nausea and vomiting. Genitourinary: Negative for dysuria, frequency and hematuria. Musculoskeletal: Negative for arthralgias. Neurological: Negative for dizziness, syncope and weakness. Psychiatric/Behavioral: Negative for behavioral problems. The patient is not nervous/anxious. Vitals:    12/26/17 1222   BP: (!) 187/146   Pulse: 84   Resp: 16   Temp: 98.3 °F (36.8 °C)   SpO2: 99%   Weight: 104.3 kg (230 lb)   Height: 5' 5\" (1.651 m)            Physical Exam   Constitutional: She appears well-developed and well-nourished. HENT:   Head: Normocephalic and atraumatic. Mouth/Throat: Oropharynx is clear and moist.   Eyes: EOM are normal. Pupils are equal, round, and reactive to light. Neck: Normal range of motion. Neck supple. Cardiovascular: Normal rate, regular rhythm, normal heart sounds and intact distal pulses. Exam reveals no gallop and no friction rub. No murmur heard. Pulmonary/Chest: Effort normal. No respiratory distress. She has no wheezes. She has no rales. Abdominal: Soft. There is no tenderness. There is no rebound. Musculoskeletal: Normal range of motion. She exhibits no tenderness. Neurological: She is alert. No cranial nerve deficit. Motor; symmetric   Skin: No erythema. Psychiatric: She has a normal mood and affect. Her behavior is normal.   Nursing note and vitals reviewed.   Note written by Boy Berry, as dictated by Jojo Hyatt MD 3:09 PM     [facility]  ED Course       Procedures

## 2017-12-26 NOTE — DISCHARGE INSTRUCTIONS
Chest Pain: Care Instructions  Your Care Instructions    There are many things that can cause chest pain. Some are not serious and will get better on their own in a few days. But some kinds of chest pain need more testing and treatment. Your doctor may have recommended a follow-up visit in the next 8 to 12 hours. If you are not getting better, you may need more tests or treatment. Even though your doctor has released you, you still need to watch for any problems. The doctor carefully checked you, but sometimes problems can develop later. If you have new symptoms or if your symptoms do not get better, get medical care right away. If you have worse or different chest pain or pressure that lasts more than 5 minutes or you passed out (lost consciousness), call 911 or seek other emergency help right away. A medical visit is only one step in your treatment. Even if you feel better, you still need to do what your doctor recommends, such as going to all suggested follow-up appointments and taking medicines exactly as directed. This will help you recover and help prevent future problems. How can you care for yourself at home? · Rest until you feel better. · Take your medicine exactly as prescribed. Call your doctor if you think you are having a problem with your medicine. · Do not drive after taking a prescription pain medicine. When should you call for help? Call 911 if:  ? · You passed out (lost consciousness). ? · You have severe difficulty breathing. ? · You have symptoms of a heart attack. These may include:  ¨ Chest pain or pressure, or a strange feeling in your chest.  ¨ Sweating. ¨ Shortness of breath. ¨ Nausea or vomiting. ¨ Pain, pressure, or a strange feeling in your back, neck, jaw, or upper belly or in one or both shoulders or arms. ¨ Lightheadedness or sudden weakness. ¨ A fast or irregular heartbeat.   After you call 911, the  may tell you to chew 1 adult-strength or 2 to 4 low-dose aspirin. Wait for an ambulance. Do not try to drive yourself. ?Call your doctor today if:  ? · You have any trouble breathing. ? · Your chest pain gets worse. ? · You are dizzy or lightheaded, or you feel like you may faint. ? · You are not getting better as expected. ? · You are having new or different chest pain. Where can you learn more? Go to http://davonte-tolu.info/. Enter A120 in the search box to learn more about \"Chest Pain: Care Instructions. \"  Current as of: March 20, 2017  Content Version: 11.4  © 9293-0334 [employer]. Care instructions adapted under license by [employer] (which disclaims liability or warranty for this information). If you have questions about a medical condition or this instruction, always ask your healthcare professional. [address] any warranty or liability for your use of this information. High Blood Pressure: Care Instructions  Your Care Instructions    If your blood pressure is usually above 140/90, you have high blood pressure, or hypertension. That means the top number is 140 or higher or the bottom number is 90 or higher, or both. Despite what a lot of people think, high blood pressure usually doesn't cause headaches or make you feel dizzy or lightheaded. It usually has no symptoms. But it does increase your risk for heart attack, stroke, and kidney or eye damage. The higher your blood pressure, the more your risk increases. Your doctor will give you a goal for your blood pressure. Your goal will be based on your health and your age. An example of a goal is to keep your blood pressure below 140/90. Lifestyle changes, such as eating healthy and being active, are always important to help lower blood pressure. You might also take medicine to reach your blood pressure goal.  Follow-up care is a key part of your treatment and safety.  Be sure to make and go to all appointments, and call your doctor if you are having problems. It's also a good idea to know your test results and keep a list of the medicines you take. How can you care for yourself at home? Medical treatment  · If you stop taking your medicine, your blood pressure will go back up. You may take one or more types of medicine to lower your blood pressure. Be safe with medicines. Take your medicine exactly as prescribed. Call your doctor if you think you are having a problem with your medicine. · Talk to your doctor before you start taking aspirin every day. Aspirin can help certain people lower their risk of a heart attack or stroke. But taking aspirin isn't right for everyone, because it can cause serious bleeding. · See your doctor regularly. You may need to see the doctor more often at first or until your blood pressure comes down. · If you are taking blood pressure medicine, talk to your doctor before you take decongestants or anti-inflammatory medicine, such as ibuprofen. Some of these medicines can raise blood pressure. · Learn how to check your blood pressure at home. Lifestyle changes  · Stay at a healthy weight. This is especially important if you put on weight around the waist. Losing even 10 pounds can help you lower your blood pressure. · If your doctor recommends it, get more exercise. Walking is a good choice. Bit by bit, increase the amount you walk every day. Try for at least 30 minutes on most days of the week. You also may want to swim, bike, or do other activities. · Avoid or limit alcohol. Talk to your doctor about whether you can drink any alcohol. · Try to limit how much sodium you eat to less than 2,300 milligrams (mg) a day. Your doctor may ask you to try to eat less than 1,500 mg a day. · Eat plenty of fruits (such as bananas and oranges), vegetables, legumes, whole grains, and low-fat dairy products. · Lower the amount of saturated fat in your diet.  Saturated fat is found in animal products such as milk, cheese, and meat. Limiting these foods may help you lose weight and also lower your risk for heart disease. · Do not smoke. Smoking increases your risk for heart attack and stroke. If you need help quitting, talk to your doctor about stop-smoking programs and medicines. These can increase your chances of quitting for good. When should you call for help? Call 911 anytime you think you may need emergency care. This may mean having symptoms that suggest that your blood pressure is causing a serious heart or blood vessel problem. Your blood pressure may be over 180/110. ? For example, call 911 if:  ? · You have symptoms of a heart attack. These may include:  ¨ Chest pain or pressure, or a strange feeling in the chest.  ¨ Sweating. ¨ Shortness of breath. ¨ Nausea or vomiting. ¨ Pain, pressure, or a strange feeling in the back, neck, jaw, or upper belly or in one or both shoulders or arms. ¨ Lightheadedness or sudden weakness. ¨ A fast or irregular heartbeat. ? · You have symptoms of a stroke. These may include:  ¨ Sudden numbness, tingling, weakness, or loss of movement in your face, arm, or leg, especially on only one side of your body. ¨ Sudden vision changes. ¨ Sudden trouble speaking. ¨ Sudden confusion or trouble understanding simple statements. ¨ Sudden problems with walking or balance. ¨ A sudden, severe headache that is different from past headaches. ? · You have severe back or belly pain. ?Do not wait until your blood pressure comes down on its own. Get help right away. ?Call your doctor now or seek immediate care if:  ? · Your blood pressure is much higher than normal (such as 180/110 or higher), but you don't have symptoms. ? · You think high blood pressure is causing symptoms, such as:  ¨ Severe headache. ¨ Blurry vision. ? Watch closely for changes in your health, and be sure to contact your doctor if:  ? · Your blood pressure measures 140/90 or higher at least 2 times.  That means the top number is 140 or higher or the bottom number is 90 or higher, or both. ? · You think you may be having side effects from your blood pressure medicine. ? · Your blood pressure is usually normal, but it goes above normal at least 2 times. Where can you learn more? Go to http://davonte-tolu.info/. Enter U085 in the search box to learn more about \"High Blood Pressure: Care Instructions. \"  Current as of: September 21, 2016  Content Version: 11.4  © 5131-7465 [employer]. Care instructions adapted under license by [employer] (which disclaims liability or warranty for this information). If you have questions about a medical condition or this instruction, always ask your healthcare professional. [address] any warranty or liability for your use of this information.

## 2019-01-06 ENCOUNTER — APPOINTMENT (OUTPATIENT)
Dept: GENERAL RADIOLOGY | Age: 34
End: 2019-01-06
Attending: PHYSICIAN ASSISTANT
Payer: MEDICAID

## 2019-01-06 ENCOUNTER — HOSPITAL ENCOUNTER (EMERGENCY)
Age: 34
Discharge: HOME | End: 2019-01-06
Attending: EMERGENCY MEDICINE
Payer: MEDICAID

## 2019-01-06 VITALS
HEIGHT: 65 IN | HEART RATE: 78 BPM | OXYGEN SATURATION: 99 % | BODY MASS INDEX: 42.49 KG/M2 | SYSTOLIC BLOOD PRESSURE: 152 MMHG | TEMPERATURE: 98.5 F | WEIGHT: 255 LBS | RESPIRATION RATE: 18 BRPM | DIASTOLIC BLOOD PRESSURE: 113 MMHG

## 2019-01-06 DIAGNOSIS — N93.8 DUB (DYSFUNCTIONAL UTERINE BLEEDING): ICD-10-CM

## 2019-01-06 DIAGNOSIS — J20.9 ACUTE BRONCHITIS, UNSPECIFIED ORGANISM: Primary | ICD-10-CM

## 2019-01-06 LAB
ABO + RH BLD: NORMAL
ALBUMIN SERPL-MCNC: 3.4 G/DL (ref 3.5–5)
ALBUMIN/GLOB SERPL: 0.9 {RATIO} (ref 1.1–2.2)
ALP SERPL-CCNC: 63 U/L (ref 45–117)
ALT SERPL-CCNC: 29 U/L (ref 12–78)
ANION GAP SERPL CALC-SCNC: 11 MMOL/L (ref 5–15)
APPEARANCE UR: CLEAR
AST SERPL-CCNC: 21 U/L (ref 15–37)
BACTERIA URNS QL MICRO: NEGATIVE /HPF
BASOPHILS # BLD: 0 K/UL (ref 0–0.1)
BASOPHILS NFR BLD: 0 % (ref 0–1)
BILIRUB SERPL-MCNC: 0.2 MG/DL (ref 0.2–1)
BILIRUB UR QL: NEGATIVE
BLOOD GROUP ANTIBODIES SERPL: NORMAL
BUN SERPL-MCNC: 7 MG/DL (ref 6–20)
BUN/CREAT SERPL: 9 (ref 12–20)
CALCIUM SERPL-MCNC: 8.7 MG/DL (ref 8.5–10.1)
CHLORIDE SERPL-SCNC: 105 MMOL/L (ref 97–108)
CO2 SERPL-SCNC: 26 MMOL/L (ref 21–32)
COLOR UR: ABNORMAL
CREAT SERPL-MCNC: 0.79 MG/DL (ref 0.55–1.02)
DIFFERENTIAL METHOD BLD: ABNORMAL
EOSINOPHIL # BLD: 0.1 K/UL (ref 0–0.4)
EOSINOPHIL NFR BLD: 4 % (ref 0–7)
EPITH CASTS URNS QL MICRO: ABNORMAL /LPF
ERYTHROCYTE [DISTWIDTH] IN BLOOD BY AUTOMATED COUNT: 13.3 % (ref 11.5–14.5)
GLOBULIN SER CALC-MCNC: 3.9 G/DL (ref 2–4)
GLUCOSE SERPL-MCNC: 92 MG/DL (ref 65–100)
GLUCOSE UR STRIP.AUTO-MCNC: NEGATIVE MG/DL
HCG UR QL: NEGATIVE
HCT VFR BLD AUTO: 37 % (ref 35–47)
HGB BLD-MCNC: 12.1 G/DL (ref 11.5–16)
HGB UR QL STRIP: ABNORMAL
IMM GRANULOCYTES # BLD: 0 K/UL (ref 0–0.04)
IMM GRANULOCYTES NFR BLD AUTO: 0 % (ref 0–0.5)
KETONES UR QL STRIP.AUTO: NEGATIVE MG/DL
LEUKOCYTE ESTERASE UR QL STRIP.AUTO: NEGATIVE
LYMPHOCYTES # BLD: 1.3 K/UL (ref 0.8–3.5)
LYMPHOCYTES NFR BLD: 40 % (ref 12–49)
MCH RBC QN AUTO: 28.3 PG (ref 26–34)
MCHC RBC AUTO-ENTMCNC: 32.7 G/DL (ref 30–36.5)
MCV RBC AUTO: 86.4 FL (ref 80–99)
MONOCYTES # BLD: 0.4 K/UL (ref 0–1)
MONOCYTES NFR BLD: 13 % (ref 5–13)
NEUTS SEG # BLD: 1.4 K/UL (ref 1.8–8)
NEUTS SEG NFR BLD: 43 % (ref 32–75)
NITRITE UR QL STRIP.AUTO: NEGATIVE
NRBC # BLD: 0 K/UL (ref 0–0.01)
NRBC BLD-RTO: 0 PER 100 WBC
PH UR STRIP: 7.5 [PH] (ref 5–8)
PLATELET # BLD AUTO: 214 K/UL (ref 150–400)
PMV BLD AUTO: 10.5 FL (ref 8.9–12.9)
POTASSIUM SERPL-SCNC: 3.8 MMOL/L (ref 3.5–5.1)
PROT SERPL-MCNC: 7.3 G/DL (ref 6.4–8.2)
PROT UR STRIP-MCNC: NEGATIVE MG/DL
RBC # BLD AUTO: 4.28 M/UL (ref 3.8–5.2)
RBC #/AREA URNS HPF: ABNORMAL /HPF (ref 0–5)
SODIUM SERPL-SCNC: 142 MMOL/L (ref 136–145)
SP GR UR REFRACTOMETRY: 1.02 (ref 1–1.03)
SPECIMEN EXP DATE BLD: NORMAL
UA: UC IF INDICATED,UAUC: ABNORMAL
UROBILINOGEN UR QL STRIP.AUTO: 1 EU/DL (ref 0.2–1)
WBC # BLD AUTO: 3.2 K/UL (ref 3.6–11)
WBC URNS QL MICRO: ABNORMAL /HPF (ref 0–4)

## 2019-01-06 PROCEDURE — 86900 BLOOD TYPING SEROLOGIC ABO: CPT

## 2019-01-06 PROCEDURE — 85025 COMPLETE CBC W/AUTO DIFF WBC: CPT

## 2019-01-06 PROCEDURE — 81001 URINALYSIS AUTO W/SCOPE: CPT

## 2019-01-06 PROCEDURE — 81025 URINE PREGNANCY TEST: CPT

## 2019-01-06 PROCEDURE — 71046 X-RAY EXAM CHEST 2 VIEWS: CPT

## 2019-01-06 PROCEDURE — 80053 COMPREHEN METABOLIC PANEL: CPT

## 2019-01-06 PROCEDURE — 99283 EMERGENCY DEPT VISIT LOW MDM: CPT

## 2019-01-06 PROCEDURE — 74011250637 HC RX REV CODE- 250/637: Performed by: PHYSICIAN ASSISTANT

## 2019-01-06 PROCEDURE — 36415 COLL VENOUS BLD VENIPUNCTURE: CPT

## 2019-01-06 PROCEDURE — 94640 AIRWAY INHALATION TREATMENT: CPT

## 2019-01-06 PROCEDURE — 74011000250 HC RX REV CODE- 250: Performed by: PHYSICIAN ASSISTANT

## 2019-01-06 PROCEDURE — 77030029684 HC NEB SM VOL KT MONA -A

## 2019-01-06 RX ORDER — IPRATROPIUM BROMIDE AND ALBUTEROL SULFATE 2.5; .5 MG/3ML; MG/3ML
3 SOLUTION RESPIRATORY (INHALATION)
Status: COMPLETED | OUTPATIENT
Start: 2019-01-06 | End: 2019-01-06

## 2019-01-06 RX ORDER — ALBUTEROL SULFATE 90 UG/1
1-2 AEROSOL, METERED RESPIRATORY (INHALATION)
Qty: 1 INHALER | Refills: 1 | Status: SHIPPED | OUTPATIENT
Start: 2019-01-06 | End: 2019-05-17 | Stop reason: ALTCHOICE

## 2019-01-06 RX ORDER — CHLORTHALIDONE 25 MG/1
25 TABLET ORAL DAILY
Qty: 30 TAB | Refills: 0 | Status: SHIPPED | OUTPATIENT
Start: 2019-01-06 | End: 2019-02-05

## 2019-01-06 RX ORDER — CHLORTHALIDONE 25 MG/1
25 TABLET ORAL
Status: COMPLETED | OUTPATIENT
Start: 2019-01-06 | End: 2019-01-06

## 2019-01-06 RX ADMIN — CHLORTHALIDONE 25 MG: 25 TABLET ORAL at 18:38

## 2019-01-06 RX ADMIN — IPRATROPIUM BROMIDE AND ALBUTEROL SULFATE 3 ML: .5; 3 SOLUTION RESPIRATORY (INHALATION) at 18:36

## 2019-01-06 NOTE — LETTER
[facility] EMERGENCY DEPT 
[address] [facility] [address] [id number] [phone] Work/School Note Date: 1/6/2019 To Whom It May concern: 
 
Tina Mrate was seen and treated today in the emergency room by the following provider(s): 
Attending Provider: Glinda Mcburney, MD 
Physician Assistant: AJ Tolliver. Tina Marte may return to work on 1/7/19. Sincerely, AJ Rollins

## 2019-01-06 NOTE — ED NOTES
Pt here for evaluation of abdominal pain since this am. According to pt her menstrual cycle started on 12/18/18 and currently still going on. Pt is A+Ox3 clear speaking. Emergency Department Nursing Plan of Care       The Nursing Plan of Care is developed from the Nursing assessment and Emergency Department Attending provider initial evaluation. The plan of care may be reviewed in the ED Provider note.     The Plan of Care was developed with the following considerations:   Patient / Family readiness to learn indicated by:verbalized understanding  Persons(s) to be included in education: patient  Barriers to Learning/Limitations:No    Signed     Lo Olson RN    1/6/2019   5:25 PM

## 2019-01-06 NOTE — DISCHARGE INSTRUCTIONS
Patient Education        Abnormal Uterine Bleeding: Care Instructions  Your Care Instructions    Abnormal uterine bleeding (AUB) is irregular bleeding from the uterus that is longer or heavier than usual or does not occur at your regular time. Sometimes it is caused by changes in hormone levels. It can also be caused by growths in the uterus, such as fibroids or polyps. Sometimes a cause cannot be found. You may have heavy bleeding when you are not expecting your period. Your doctor may suggest a pregnancy test, if you think you are pregnant. Follow-up care is a key part of your treatment and safety. Be sure to make and go to all appointments, and call your doctor if you are having problems. It's also a good idea to know your test results and keep a list of the medicines you take. How can you care for yourself at home? · Be safe with medicines. Take pain medicines exactly as directed. ? If the doctor gave you a prescription medicine for pain, take it as prescribed. ? If you are not taking a prescription pain medicine, ask your doctor if you can take an over-the-counter medicine. · You may be low in iron because of blood loss. Eat a balanced diet that is high in iron and vitamin C. Foods rich in iron include red meat, shellfish, eggs, beans, and leafy green vegetables. Talk to your doctor about whether you need to take iron pills or a multivitamin. When should you call for help? Call 911 anytime you think you may need emergency care. For example, call if:    · You passed out (lost consciousness).    Call your doctor now or seek immediate medical care if:    · You have new or worse belly or pelvic pain.     · You have severe vaginal bleeding.     · You feel dizzy or lightheaded, or you feel like you may faint.    Watch closely for changes in your health, and be sure to contact your doctor if:    · You think you may be pregnant.     · Your bleeding gets worse.     · You do not get better as expected.    Where can you learn more? Go to http://davonte-tolu.info/. Enter H433 in the search box to learn more about \"Abnormal Uterine Bleeding: Care Instructions. \"  Current as of: May 15, 2018  Content Version: 11.8  © 5740-9505 [employer]. Care instructions adapted under license by [employer] (which disclaims liability or warranty for this information). If you have questions about a medical condition or this instruction, always ask your healthcare professional. [address] any warranty or liability for your use of this information. Patient Education        Bronchitis: Care Instructions  Your Care Instructions    Bronchitis is inflammation of the bronchial tubes, which carry air to the lungs. The tubes swell and produce mucus, or phlegm. The mucus and inflamed bronchial tubes make you cough. You may have trouble breathing. Most cases of bronchitis are caused by viruses like those that cause colds. Antibiotics usually do not help and they may be harmful. Bronchitis usually develops rapidly and lasts about 2 to 3 weeks in otherwise healthy people. Follow-up care is a key part of your treatment and safety. Be sure to make and go to all appointments, and call your doctor if you are having problems. It's also a good idea to know your test results and keep a list of the medicines you take. How can you care for yourself at home? · Take all medicines exactly as prescribed. Call your doctor if you think you are having a problem with your medicine. · Get some extra rest.  · Take an over-the-counter pain medicine, such as acetaminophen (Tylenol), ibuprofen (Advil, Motrin), or naproxen (Aleve) to reduce fever and relieve body aches. Read and follow all instructions on the label. · Do not take two or more pain medicines at the same time unless the doctor told you to. Many pain medicines have acetaminophen, which is Tylenol.  Too much acetaminophen (Tylenol) can be harmful. · Take an over-the-counter cough medicine that contains dextromethorphan to help quiet a dry, hacking cough so that you can sleep. Avoid cough medicines that have more than one active ingredient. Read and follow all instructions on the label. · Breathe moist air from a humidifier, hot shower, or sink filled with hot water. The heat and moisture will thin mucus so you can cough it out. · Do not smoke. Smoking can make bronchitis worse. If you need help quitting, talk to your doctor about stop-smoking programs and medicines. These can increase your chances of quitting for good. When should you call for help? Call 911 anytime you think you may need emergency care. For example, call if:    · You have severe trouble breathing.    Call your doctor now or seek immediate medical care if:    · You have new or worse trouble breathing.     · You cough up dark brown or bloody mucus (sputum).     · You have a new or higher fever.     · You have a new rash.    Watch closely for changes in your health, and be sure to contact your doctor if:    · You cough more deeply or more often, especially if you notice more mucus or a change in the color of your mucus.     · You are not getting better as expected. Where can you learn more? Go to http://davonte-tolu.info/. Enter H333 in the search box to learn more about \"Bronchitis: Care Instructions. \"  Current as of: December 6, 2017  Content Version: 11.8  © 4425-5153 [employer]. Care instructions adapted under license by [employer] (which disclaims liability or warranty for this information). If you have questions about a medical condition or this instruction, always ask your healthcare professional. [place] [address] any warranty or liability for your use of this information.

## 2019-01-06 NOTE — ED NOTES
Bedside shift change report given to Salvatore Torre RN (oncoming nurse) by Susanna Short RN (offgoing nurse). Report included the following information SBAR, ED Summary, Procedure Summary, MAR and Recent Results.

## 2019-01-06 NOTE — ED PROVIDER NOTES
EMERGENCY DEPARTMENT HISTORY AND PHYSICAL EXAM    Date: 1/6/2019  Patient Name: Janet Howard    History of Presenting Illness     Chief Complaint   Patient presents with    Vaginal Bleeding    Shortness of Breath         History Provided By: Patient      HPI: Janet Howard is a 34 y.o. female with a PMH of hypertension, asthma and bronchitis who presents with cc of cough, sob for two days. Pt states she usually has an inhaler but has ran out of that as well as her htn medication for the past month. Denies sick contacts or fever/chills. Pt denies any extremity weakness, dizziness/lightheadedness, syncope, drooling, facial drooping or ams/confusion. Pt also cc of being on her period since 12/16/18. Pt states her nexplanon needs to be replaced and she has contacter her ob/gyn who is aware. She denies any rush of blood and states some days she has spotting and some days needs to change a pad/tampon. She specifically denies any fevers, chills, nausea, vomiting, chest pain, shortness of breath, headache, rash, diarrhea, sweating or weight loss. All other ROS negative at this time  Pt is in no acute distress and is speaking in full sentences      PCP: None    Current Outpatient Medications   Medication Sig Dispense Refill    chlorthalidone (HYGROTEN) 25 mg tablet Take 1 Tab by mouth daily for 30 days. 30 Tab 0    albuterol (PROVENTIL HFA, VENTOLIN HFA, PROAIR HFA) 90 mcg/actuation inhaler Take 1-2 Puffs by inhalation every four (4) hours as needed for Wheezing. 1 Inhaler 1    chlorthalidone (HYGROTEN) 25 mg tablet Take 1 Tab by mouth daily. 61 Tab 3       Past History     Past Medical History:  Past Medical History:   Diagnosis Date    CERVANTES (dyspnea on exertion) 6/29/2011    Edema of lower extremity 6/29/2011    Hypertension     Respiratory abnormalities     bronchitis       Past Surgical History:  History reviewed. No pertinent surgical history. Family History:  History reviewed.  No pertinent family history. Social History:  Social History     Tobacco Use    Smoking status: Current Every Day Smoker     Packs/day: 0.25    Smokeless tobacco: Never Used   Substance Use Topics    Alcohol use: Yes     Comment: occasionally    Drug use: No       Allergies:  No Known Allergies      Review of Systems   Review of Systems   Constitutional: Negative. Negative for chills and fever. HENT: Negative. Eyes: Negative. Respiratory: Positive for cough and shortness of breath. Negative for choking and chest tightness. Cardiovascular: Negative. Negative for chest pain. Gastrointestinal: Negative. Negative for abdominal pain, diarrhea, nausea and vomiting. Endocrine: Negative. Genitourinary: Positive for vaginal bleeding. Musculoskeletal: Negative. Skin: Negative. Allergic/Immunologic: Negative. Neurological: Negative. Negative for headaches. Hematological: Negative. Psychiatric/Behavioral: Negative. All other systems reviewed and are negative. Physical Exam     Vitals:    01/06/19 1720 01/06/19 1835 01/06/19 1838   BP: (!) 157/105  (!) 152/113   Pulse: 80  78   Resp: 16  18   Temp: 98.5 °F (36.9 °C)     SpO2: 99% 100% 99%   Weight: 115.7 kg (255 lb)     Height: 5' 5\" (1.651 m)       Physical Exam   Constitutional: She is oriented to person, place, and time. She appears well-developed and well-nourished. No distress. HENT:   Head: Normocephalic and atraumatic. Right Ear: External ear normal.   Left Ear: External ear normal.   Nose: Nose normal.   Mouth/Throat: Oropharynx is clear and moist. No oropharyngeal exudate. Eyes: Conjunctivae and EOM are normal. Pupils are equal, round, and reactive to light. Neck: Normal range of motion. Neck supple. No tracheal deviation present. Cardiovascular: Normal rate, regular rhythm, normal heart sounds and intact distal pulses. Pulmonary/Chest: Effort normal and breath sounds normal. No respiratory distress. She has no wheezes. Abdominal: Soft. Bowel sounds are normal. She exhibits no distension. There is no tenderness. There is no rebound, no CVA tenderness, no tenderness at McBurney's point and negative Storey's sign. Musculoskeletal: Normal range of motion. She exhibits no edema, tenderness or deformity. Lymphadenopathy:     She has no cervical adenopathy. Neurological: She is alert and oriented to person, place, and time. She has normal reflexes. She displays normal reflexes. No cranial nerve deficit. She exhibits normal muscle tone. Coordination normal.   Skin: Skin is warm and dry. She is not diaphoretic. No pallor. Psychiatric: She has a normal mood and affect. Her behavior is normal. Judgment and thought content normal.   Nursing note and vitals reviewed. Diagnostic Study Results     Labs -     Recent Results (from the past 12 hour(s))   CBC WITH AUTOMATED DIFF    Collection Time: 01/06/19  5:53 PM   Result Value Ref Range    WBC 3.2 (L) 3.6 - 11.0 K/uL    RBC 4.28 3.80 - 5.20 M/uL    HGB 12.1 11.5 - 16.0 g/dL    HCT 37.0 35.0 - 47.0 %    MCV 86.4 80.0 - 99.0 FL    MCH 28.3 26.0 - 34.0 PG    MCHC 32.7 30.0 - 36.5 g/dL    RDW 13.3 11.5 - 14.5 %    PLATELET 872 537 - 586 K/uL    MPV 10.5 8.9 - 12.9 FL    NRBC 0.0 0  WBC    ABSOLUTE NRBC 0.00 0.00 - 0.01 K/uL    NEUTROPHILS 43 32 - 75 %    LYMPHOCYTES 40 12 - 49 %    MONOCYTES 13 5 - 13 %    EOSINOPHILS 4 0 - 7 %    BASOPHILS 0 0 - 1 %    IMMATURE GRANULOCYTES 0 0.0 - 0.5 %    ABS. NEUTROPHILS 1.4 (L) 1.8 - 8.0 K/UL    ABS. LYMPHOCYTES 1.3 0.8 - 3.5 K/UL    ABS. MONOCYTES 0.4 0.0 - 1.0 K/UL    ABS. EOSINOPHILS 0.1 0.0 - 0.4 K/UL    ABS. BASOPHILS 0.0 0.0 - 0.1 K/UL    ABS. IMM.  GRANS. 0.0 0.00 - 0.04 K/UL    DF AUTOMATED     METABOLIC PANEL, COMPREHENSIVE    Collection Time: 01/06/19  5:53 PM   Result Value Ref Range    Sodium 142 136 - 145 mmol/L    Potassium 3.8 3.5 - 5.1 mmol/L    Chloride 105 97 - 108 mmol/L    CO2 26 21 - 32 mmol/L    Anion gap 11 5 - 15 mmol/L    Glucose 92 65 - 100 mg/dL    BUN 7 6 - 20 MG/DL    Creatinine 0.79 0.55 - 1.02 MG/DL    BUN/Creatinine ratio 9 (L) 12 - 20      GFR est AA >60 >60 ml/min/1.73m2    GFR est non-AA >60 >60 ml/min/1.73m2    Calcium 8.7 8.5 - 10.1 MG/DL    Bilirubin, total 0.2 0.2 - 1.0 MG/DL    ALT (SGPT) 29 12 - 78 U/L    AST (SGOT) 21 15 - 37 U/L    Alk. phosphatase 63 45 - 117 U/L    Protein, total 7.3 6.4 - 8.2 g/dL    Albumin 3.4 (L) 3.5 - 5.0 g/dL    Globulin 3.9 2.0 - 4.0 g/dL    A-G Ratio 0.9 (L) 1.1 - 2.2     HCG URINE, QL. - POC    Collection Time: 01/06/19  6:12 PM   Result Value Ref Range    Pregnancy test,urine (POC) NEGATIVE  NEG         Radiologic Studies -   XR CHEST PA LAT   Final Result   IMPRESSION:    Clear lungs. CT Results  (Last 48 hours)    None        CXR Results  (Last 48 hours)               01/06/19 1810  XR CHEST PA LAT Final result    Impression:  IMPRESSION:    Clear lungs. Narrative:  PA AND LATERAL CHEST RADIOGRAPHS: 1/6/2019 6:10 PM       INDICATION: Dyspnea, vaginal bleeding, abdominal pain. Pneumonia. COMPARISON: 12/26/2017, 6/29/2017. TECHNIQUE: Frontal and lateral radiographs of the chest.       FINDINGS:   The lungs are clear. The central airways are patent. The heart size is normal.   No pneumothorax or pleural effusion. Medical Decision Making   I am the first provider for this patient. I reviewed the vital signs, available nursing notes, past medical history, past surgical history, family history and social history. Vital Signs-Reviewed the patient's vital signs. Records Reviewed: Nursing Notes, Old Medical Records, Previous Radiology Studies and Previous Laboratory Studies            Disposition:  Discharge     DISCHARGE NOTE:   Care plan outlined and precautions discussed. Patient has no new complaints, changes, or physical findings. Results of visit were reviewed with the patient.  All medications were reviewed with the patient; will d/c home. All of pt's questions and concerns were addressed. Patient was instructed and agrees to follow up with pcp, as well as to return to the ED upon further deterioration. Patient is ready to go home. Follow-up Information     Follow up With Specialties Details Why [id number] [facility]  Schedule an appointment as soon as possible for a visit in 3 days If symptoms worsen [id number] [place] [id number]  [phone]          Current Discharge Medication List      START taking these medications    Details   !! chlorthalidone (HYGROTEN) 25 mg tablet Take 1 Tab by mouth daily for 30 days. Qty: 30 Tab, Refills: 0      albuterol (PROVENTIL HFA, VENTOLIN HFA, PROAIR HFA) 90 mcg/actuation inhaler Take 1-2 Puffs by inhalation every four (4) hours as needed for Wheezing. Qty: 1 Inhaler, Refills: 1       !! - Potential duplicate medications found. Please discuss with provider. CONTINUE these medications which have NOT CHANGED    Details   !! chlorthalidone (HYGROTEN) 25 mg tablet Take 1 Tab by mouth daily. Qty: 60 Tab, Refills: 3       !! - Potential duplicate medications found. Please discuss with provider. Provider Notes (Medical Decision Making):   Patient presents with SOB. DDx: COPD/Asthma exacerbation, Bronchitis, pneumonia  Pt  Feels better after the breathing tx. She is speaking in full sentences with no sob or difficultly breathing. Worsening si/sxs discussed extensively   Follow up with PCP or RTC if symptoms/signs worsen  Side effects of medication discussed  Education materials provided at discharge   Pt verbalizes agreement with plan      Procedures:  Procedures        Diagnosis     Clinical Impression:   1.  Acute bronchitis, unspecified organism    2. DUB (dysfunctional uterine bleeding)

## 2019-01-06 NOTE — ED TRIAGE NOTES
Patient presents to ED with c/o vaginal bleeding for 1 month. Patient states that she has been having chest pain and shortness of breath since Thursday. Hx of bronchitis.

## 2019-01-07 NOTE — ED NOTES
AJ Beaulieu at bedside reviewing patient's discharge instructions and reviewing medications. Patient ambulatory home with self. Patient in no apparent distress.

## 2019-02-27 ENCOUNTER — HOSPITAL ENCOUNTER (EMERGENCY)
Age: 34
Discharge: HOME | End: 2019-02-27
Attending: EMERGENCY MEDICINE
Payer: MEDICAID

## 2019-02-27 VITALS
BODY MASS INDEX: 40.57 KG/M2 | OXYGEN SATURATION: 97 % | HEIGHT: 65 IN | SYSTOLIC BLOOD PRESSURE: 165 MMHG | RESPIRATION RATE: 18 BRPM | HEART RATE: 73 BPM | WEIGHT: 243.5 LBS | DIASTOLIC BLOOD PRESSURE: 115 MMHG | TEMPERATURE: 98 F

## 2019-02-27 DIAGNOSIS — Z76.0 MEDICATION REFILL: ICD-10-CM

## 2019-02-27 DIAGNOSIS — I10 ESSENTIAL HYPERTENSION: Primary | ICD-10-CM

## 2019-02-27 PROCEDURE — 99283 EMERGENCY DEPT VISIT LOW MDM: CPT

## 2019-02-27 PROCEDURE — 74011250637 HC RX REV CODE- 250/637: Performed by: EMERGENCY MEDICINE

## 2019-02-27 RX ORDER — CHLORTHALIDONE 25 MG/1
25 TABLET ORAL DAILY
Qty: 30 TAB | Refills: 4 | Status: SHIPPED | OUTPATIENT
Start: 2019-02-27 | End: 2019-03-29

## 2019-02-27 RX ORDER — CHLORTHALIDONE 50 MG/1
100 TABLET ORAL
Status: DISCONTINUED | OUTPATIENT
Start: 2019-02-27 | End: 2019-02-27 | Stop reason: DRUGHIGH

## 2019-02-27 RX ORDER — CHLORTHALIDONE 50 MG/1
50 TABLET ORAL
Status: COMPLETED | OUTPATIENT
Start: 2019-02-27 | End: 2019-02-27

## 2019-02-27 RX ADMIN — CHLORTHALIDONE 50 MG: 25 TABLET ORAL at 11:47

## 2019-02-27 NOTE — ED TRIAGE NOTES
Reports dizziness with chest pain yesterday; reports feeling okay today but believes blood pressure was elevated.

## 2019-02-27 NOTE — ED NOTES
Patient given copy of discharge instructions and 1 electronic script(s). Patient given a current medication reconciliation form and verbalized understanding of their medications and importance of discussing medications at follow-up. Patient stable at discharge. Ambulatory out of ED with self.

## 2019-02-27 NOTE — DISCHARGE INSTRUCTIONS
Patient Education        Learning About Diuretics for High Blood Pressure  Introduction  Diuretics help to lower blood pressure. This reduces your risk of a heart attack and stroke. It also reduces your risk of kidney disease. Diuretics cause your kidneys to remove sodium and water. They also relax the blood vessel walls. These help lower your blood pressure. Examples  · Chlorthalidone  · Hydrochlorothiazide  Possible side effects  There are some common side effects. They are:  · Too little potassium. · Feeling dizzy. · Rash. · Urinating a lot. · High blood sugar. (But this is not common.)  You may have other side effects. Check the information that comes with your medicine. What to know about taking this medicine  · You may take other medicines for blood pressure. Diuretics can help those work better. They can also prevent extra fluid in your body. · You may need to take potassium pills. Or you may have to watch how much potassium is in your food. Ask your doctor about this. · You may need blood tests to check your kidneys and your potassium level. · Take your medicines exactly as prescribed. Call your doctor if you think you are having a problem with your medicine. · Check with your doctor or pharmacist before you use any other medicines. This includes over-the-counter medicines. Make sure your doctor knows all of the medicines, vitamins, herbal products, and supplements you take. Taking some medicines together can cause problems. Where can you learn more? Go to http://davonte-tolu.info/. Enter R598 in the search box to learn more about \"Learning About Diuretics for High Blood Pressure. \"  Current as of: July 22, 2018  Content Version: 11.9  © 6887-7931 [employer]. Care instructions adapted under license by [employer] (which disclaims liability or warranty for this information).  If you have questions about a medical condition or this instruction, always ask your healthcare professional. [address] any warranty or liability for your use of this information. Patient Education        Home Blood Pressure Test: About This Test  What is it? A home blood pressure test allows you to keep track of your blood pressure at home. Blood pressure is a measure of the force of blood against the walls of your arteries. Blood pressure readings include two numbers, such as 130/80 (say \"130 over 80\"). The first number is the systolic pressure. The second number is the diastolic pressure. Why is this test done? You may do this test at home to:  · Find out if you have high blood pressure. · Track your blood pressure if you have high blood pressure. · Track how well medicine is working to reduce high blood pressure. · Check how lifestyle changes, such as weight loss and exercise, are affecting blood pressure. How can you prepare for the test?  · Do not use caffeine, tobacco, or medicines known to raise blood pressure (such as nasal decongestant sprays) for at least 30 minutes before taking your blood pressure. · Do not exercise for at least 30 minutes before taking your blood pressure. What happens before the test?  Take your blood pressure while you feel comfortable and relaxed. Sit quietly with both feet on the floor for at least 5 minutes before the test.  What happens during the test?  · Sit with your arm slightly bent and resting on a table so that your upper arm is at the same level as your heart. · Roll up your sleeve or take off your shirt to expose your upper arm. · Wrap the blood pressure cuff around your upper arm so that the lower edge of the cuff is about 1 inch above the bend of your elbow. Proceed with the following steps depending on if you are using an automatic or manual pressure monitor.   Automatic blood pressure monitors  · Press the on/off button on the automatic monitor and wait until the ready-to-measure \"heart\" symbol appears next to zero in the display window. · Press the start button. The cuff will inflate and deflate by itself. · Your blood pressure numbers will appear on the screen. · Write your numbers in your log book, along with the date and time. Manual blood pressure monitors  · Place the earpieces of a stethoscope in your ears, and place the bell of the stethoscope over the artery, just below the cuff. · Close the valve on the rubber inflating bulb. · Squeeze the bulb rapidly with your opposite hand to inflate the cuff until the dial or column of mercury reads about 30 mm Hg higher than your usual systolic pressure. If you do not know your usual pressure, inflate the cuff to 210 mm Hg or until the pulse at your wrist disappears. · Open the pressure valve just slightly by twisting or pressing the valve on the bulb. · As you watch the pressure slowly fall, note the level on the dial at which you first start to hear a pulsing or tapping sound through the stethoscope. This is your systolic blood pressure. · Continue letting the air out slowly. The sounds will become muffled and will finally disappear. Note the pressure when the sounds completely disappear. This is your diastolic blood pressure. Let out all the remaining air. · Write your numbers in your log book, along with the date and time. What else should you know about the test?  It is more accurate to take the average of several readings made throughout the day than to rely on a single reading. It's normal for blood pressure to go up and down throughout the day. Follow-up care is a key part of your treatment and safety. Be sure to make and go to all appointments, and call your doctor if you are having problems. It's also a good idea to keep a list of the medicines you take. Where can you learn more? Go to http://davonte-tolu.info/.   Enter C427 in the search box to learn more about \"Home Blood Pressure Test: About This Test.\"  Current as of: July 22, 2018  Content Version: 11.9  © 6600-3551 [employer]. Care instructions adapted under license by [employer] (which disclaims liability or warranty for this information). If you have questions about a medical condition or this instruction, always ask your healthcare professional. [address] any warranty or liability for your use of this information.

## 2019-02-27 NOTE — ED NOTES
Pt reports being out of blood pressure medications x 3 weeks; reports headache, dizziness, chest pain and shortness of breath yesterday; denies symptoms currently      Emergency Department Nursing Plan of Care       The Nursing Plan of Care is developed from the Nursing assessment and Emergency Department Attending provider initial evaluation. The plan of care may be reviewed in the ED Provider note.     The Plan of Care was developed with the following considerations:   Patient / Family readiness to learn indicated by:verbalized understanding  Persons(s) to be included in education: patient  Barriers to Learning/Limitations:No    Signed     Abigail Pérez RN    2/27/2019   11:17 AM

## 2019-02-27 NOTE — LETTER
[facility] EMERGENCY DEPT 
[address] [facility] [address] [id number] 
[phone] Work/School Note Date: 2/27/2019 To Whom It May concern: 
 
Yessi Peguero was seen and treated today in the emergency room by the following provider(s): 
Attending Provider: Marisa Winters MD. Yessi Peguero may return to work on 2/27/19. Sincerely, Magy Silva RN

## 2019-02-27 NOTE — ED PROVIDER NOTES
EMERGENCY DEPARTMENT HISTORY AND PHYSICAL EXAM      Date: 2/27/2019  Patient Name: Elsie Quijano    History of Presenting Illness     Chief Complaint   Patient presents with    Hypertension       History Provided By: Patient    HPI: Elsie Quijano, 34 y.o. female with PMHx significant for HTN, bronchitis, presents ambulatory to the ED with cc of an elevated BP since yesterday. She reports associated symptoms of dizziness, HA, and chest pain, which had all resolved. She notes she is on HTN medications but had ran out of her medication x 3 weeks ago. She denies a h/o MI or a FHx of cardiac disease. Pt denies any CP or SOB currently. FHx: HTN  Social hx: (+) tobacco, (+) alcohol    There are no other complaints, changes, or physical findings at this time. PCP: None    No current facility-administered medications on file prior to encounter. Current Outpatient Medications on File Prior to Encounter   Medication Sig Dispense Refill    albuterol (PROVENTIL HFA, VENTOLIN HFA, PROAIR HFA) 90 mcg/actuation inhaler Take 1-2 Puffs by inhalation every four (4) hours as needed for Wheezing. 1 Inhaler 1    chlorthalidone (HYGROTEN) 25 mg tablet Take 1 Tab by mouth daily. 61 Tab 3       Past History     Past Medical History:  Past Medical History:   Diagnosis Date    CERVANTES (dyspnea on exertion) 6/29/2011    Edema of lower extremity 6/29/2011    Hypertension     Respiratory abnormalities     bronchitis       Past Surgical History:  History reviewed. No pertinent surgical history. Family History:  History reviewed. No pertinent family history. Social History:  Social History     Tobacco Use    Smoking status: Current Every Day Smoker     Packs/day: 0.25    Smokeless tobacco: Never Used   Substance Use Topics    Alcohol use: Yes     Comment: occasionally    Drug use: No       Allergies:  No Known Allergies      Review of Systems   Review of Systems   Constitutional: Negative for fever.    HENT: Negative for sore throat. Eyes: Negative for photophobia and redness. Respiratory: Negative for shortness of breath and wheezing. Cardiovascular: Positive for chest pain. Negative for leg swelling. Gastrointestinal: Negative for abdominal pain, blood in stool, nausea and vomiting. Genitourinary: Negative for difficulty urinating, dysuria, hematuria, menstrual problem and vaginal bleeding. Musculoskeletal: Negative for back pain and joint swelling. Neurological: Positive for dizziness and headaches. Negative for seizures, syncope, speech difficulty, weakness and numbness. Hematological: Negative for adenopathy. Psychiatric/Behavioral: Negative for agitation, confusion and suicidal ideas. The patient is not nervous/anxious. Physical Exam   Physical Exam   Constitutional: She is oriented to person, place, and time. She appears well-developed and well-nourished. No distress. HENT:   Head: Normocephalic and atraumatic. Mouth/Throat: Oropharynx is clear and moist. No oropharyngeal exudate. Eyes: Conjunctivae and EOM are normal. Pupils are equal, round, and reactive to light. Left eye exhibits no discharge. Neck: Normal range of motion. Neck supple. No JVD present. Cardiovascular: Normal rate, regular rhythm, normal heart sounds and intact distal pulses. Pulmonary/Chest: Effort normal and breath sounds normal. No respiratory distress. She has no wheezes. Abdominal: Soft. Bowel sounds are normal. She exhibits no distension. There is no tenderness. There is no rebound and no guarding. Musculoskeletal: Normal range of motion. She exhibits no edema or tenderness. Lymphadenopathy:     She has no cervical adenopathy. Neurological: She is alert and oriented to person, place, and time. She has normal reflexes. No cranial nerve deficit. Skin: Skin is warm and dry. No rash noted. Psychiatric: She has a normal mood and affect.  Her behavior is normal.   Nursing note and vitals reviewed. Diagnostic Study Results     Labs - No results found for this or any previous visit (from the past 12 hour(s)). Radiologic Studies -   No orders to display     Medical Decision Making   I am the first provider for this patient. I reviewed the vital signs, available nursing notes, past medical history, past surgical history, family history and social history. Vital Signs-Reviewed the patient's vital signs. Patient Vitals for the past 12 hrs:   Temp Pulse Resp BP SpO2   02/27/19 1052 98 °F (36.7 °C) 73 18 (!) 149/114 97 %     Records Reviewed: Nursing Notes, Old Medical Records, Previous electrocardiograms, Previous Radiology Studies and Previous Laboratory Studies    Provider Notes (Medical Decision Making):   DDx: uncontrolled HTN, non-compliance    ED Course:   Initial assessment performed. The patients presenting problems have been discussed, and they are in agreement with the care plan formulated and outlined with them. I have encouraged them to ask questions as they arise throughout their visit. Critical Care Time:   none    Disposition:  DISCHARGE NOTE  12:22 PM  The patient has been re-evaluated and is ready for discharge. Reviewed available results with patient. Counseled patient on diagnosis and care plan. Patient has expressed understanding, and all questions have been answered. Patient agrees with plan and agrees to follow up as recommended, or return to the ED if their symptoms worsen. Discharge instructions have been provided and explained to the patient, along with reasons to return to the ED. PLAN:  1. Discharge  Current Discharge Medication List      START taking these medications    Details   !! chlorthalidone (HYGROTEN) 25 mg tablet Take 1 Tab by mouth daily for 30 days. Qty: 30 Tab, Refills: 4       !! - Potential duplicate medications found. Please discuss with provider.       CONTINUE these medications which have NOT CHANGED    Details   !! chlorthalidone (HYGROTEN) 25 mg tablet Take 1 Tab by mouth daily. Qty: 60 Tab, Refills: 3       !! - Potential duplicate medications found. Please discuss with provider. 2.   Follow-up Information     Follow up With Specialties Details Why Contact Info    Oswaldo Morales MD Cardiology In 1 week  [address]  [phone]          Return to ED if worse     Diagnosis     Clinical Impression:   1. Essential hypertension    2. Medication refill        Attestations: This note is prepared by Delvin Sue, acting as Scribe for Talia Begum MD.    Talia Begum MD: The scribe's documentation has been prepared under my direction and personally reviewed by me in its entirety. I confirm that the note above accurately reflects all work, treatment, procedures, and medical decision making performed by me. This note will not be viewable in [address]

## 2019-05-07 ENCOUNTER — HOSPITAL ENCOUNTER (OUTPATIENT)
Age: 34
Setting detail: OBSERVATION
Discharge: HOME | End: 2019-05-09
Attending: EMERGENCY MEDICINE | Admitting: HOSPITALIST
Payer: MEDICAID

## 2019-05-07 ENCOUNTER — APPOINTMENT (OUTPATIENT)
Dept: NON INVASIVE DIAGNOSTICS | Age: 34
End: 2019-05-07
Attending: HOSPITALIST
Payer: MEDICAID

## 2019-05-07 ENCOUNTER — APPOINTMENT (OUTPATIENT)
Dept: GENERAL RADIOLOGY | Age: 34
End: 2019-05-07
Attending: HOSPITALIST
Payer: MEDICAID

## 2019-05-07 ENCOUNTER — APPOINTMENT (OUTPATIENT)
Dept: ULTRASOUND IMAGING | Age: 34
End: 2019-05-07
Attending: EMERGENCY MEDICINE
Payer: MEDICAID

## 2019-05-07 DIAGNOSIS — R07.9 CHEST PAIN, UNSPECIFIED TYPE: ICD-10-CM

## 2019-05-07 DIAGNOSIS — N92.4 MENORRHAGIA, PREMENOPAUSAL: ICD-10-CM

## 2019-05-07 DIAGNOSIS — N93.9 VAGINAL BLEEDING: Primary | ICD-10-CM

## 2019-05-07 PROBLEM — R77.8 ELEVATED TROPONIN: Status: ACTIVE | Noted: 2019-05-07

## 2019-05-07 PROBLEM — R42 DIZZINESS: Status: ACTIVE | Noted: 2019-05-07

## 2019-05-07 LAB
ALBUMIN SERPL-MCNC: 3.3 G/DL (ref 3.5–5)
ALBUMIN/GLOB SERPL: 0.8 {RATIO} (ref 1.1–2.2)
ALP SERPL-CCNC: 65 U/L (ref 45–117)
ALT SERPL-CCNC: 54 U/L (ref 12–78)
ANION GAP SERPL CALC-SCNC: 6 MMOL/L (ref 5–15)
APPEARANCE UR: ABNORMAL
AST SERPL-CCNC: 20 U/L (ref 15–37)
ATRIAL RATE: 67 BPM
BACTERIA URNS QL MICRO: NEGATIVE /HPF
BASOPHILS # BLD: 0 K/UL (ref 0–0.1)
BASOPHILS NFR BLD: 0 % (ref 0–1)
BILIRUB SERPL-MCNC: 0.2 MG/DL (ref 0.2–1)
BILIRUB UR QL: NEGATIVE
BUN SERPL-MCNC: 13 MG/DL (ref 6–20)
BUN/CREAT SERPL: 13 (ref 12–20)
CALCIUM SERPL-MCNC: 9.3 MG/DL (ref 8.5–10.1)
CALCULATED P AXIS, ECG09: 54 DEGREES
CALCULATED R AXIS, ECG10: 80 DEGREES
CALCULATED T AXIS, ECG11: 41 DEGREES
CHLORIDE SERPL-SCNC: 105 MMOL/L (ref 97–108)
CHOLEST SERPL-MCNC: 142 MG/DL
CO2 SERPL-SCNC: 29 MMOL/L (ref 21–32)
COLOR UR: ABNORMAL
COMMENT, HOLDF: NORMAL
CREAT SERPL-MCNC: 1.02 MG/DL (ref 0.55–1.02)
D DIMER PPP FEU-MCNC: 0.46 MG/L FEU (ref 0–0.65)
DIAGNOSIS, 93000: NORMAL
DIFFERENTIAL METHOD BLD: ABNORMAL
ECHO RV TAPSE: 1.7 CM (ref 1.5–2)
ECHO TRICUSPID ANNULAR PEAK SYSTOLIC VELOCITY: 1.7 CM/S
EOSINOPHIL # BLD: 0.2 K/UL (ref 0–0.4)
EOSINOPHIL NFR BLD: 3 % (ref 0–7)
EPITH CASTS URNS QL MICRO: ABNORMAL /LPF
ERYTHROCYTE [DISTWIDTH] IN BLOOD BY AUTOMATED COUNT: 14 % (ref 11.5–14.5)
EST. AVERAGE GLUCOSE BLD GHB EST-MCNC: 134 MG/DL
GLOBULIN SER CALC-MCNC: 4 G/DL (ref 2–4)
GLUCOSE SERPL-MCNC: 141 MG/DL (ref 65–100)
GLUCOSE UR STRIP.AUTO-MCNC: NEGATIVE MG/DL
HBA1C MFR BLD: 6.3 % (ref 4.2–6.3)
HCG UR QL: NEGATIVE
HCT VFR BLD AUTO: 33.6 % (ref 35–47)
HDLC SERPL-MCNC: 45 MG/DL
HDLC SERPL: 3.2 {RATIO} (ref 0–5)
HGB BLD-MCNC: 10.9 G/DL (ref 11.5–16)
HGB UR QL STRIP: ABNORMAL
IMM GRANULOCYTES # BLD AUTO: 0 K/UL (ref 0–0.04)
IMM GRANULOCYTES NFR BLD AUTO: 0 % (ref 0–0.5)
IRON SATN MFR SERPL: 16 % (ref 20–50)
IRON SERPL-MCNC: 53 UG/DL (ref 35–150)
KETONES UR QL STRIP.AUTO: ABNORMAL MG/DL
LDLC SERPL CALC-MCNC: 80.2 MG/DL (ref 0–100)
LEUKOCYTE ESTERASE UR QL STRIP.AUTO: ABNORMAL
LIPID PROFILE,FLP: NORMAL
LYMPHOCYTES # BLD: 2.6 K/UL (ref 0.8–3.5)
LYMPHOCYTES NFR BLD: 41 % (ref 12–49)
MAGNESIUM SERPL-MCNC: 2 MG/DL (ref 1.6–2.4)
MCH RBC QN AUTO: 28.5 PG (ref 26–34)
MCHC RBC AUTO-ENTMCNC: 32.4 G/DL (ref 30–36.5)
MCV RBC AUTO: 88 FL (ref 80–99)
MONOCYTES # BLD: 0.3 K/UL (ref 0–1)
MONOCYTES NFR BLD: 5 % (ref 5–13)
NEUTS SEG # BLD: 3.1 K/UL (ref 1.8–8)
NEUTS SEG NFR BLD: 51 % (ref 32–75)
NITRITE UR QL STRIP.AUTO: NEGATIVE
NRBC # BLD: 0 K/UL (ref 0–0.01)
NRBC BLD-RTO: 0 PER 100 WBC
P-R INTERVAL, ECG05: 222 MS
PH UR STRIP: 7 [PH] (ref 5–8)
PLATELET # BLD AUTO: 217 K/UL (ref 150–400)
PMV BLD AUTO: 10.3 FL (ref 8.9–12.9)
POTASSIUM SERPL-SCNC: 2.8 MMOL/L (ref 3.5–5.1)
PROT SERPL-MCNC: 7.3 G/DL (ref 6.4–8.2)
PROT UR STRIP-MCNC: 30 MG/DL
Q-T INTERVAL, ECG07: 408 MS
QRS DURATION, ECG06: 88 MS
QTC CALCULATION (BEZET), ECG08: 431 MS
RBC # BLD AUTO: 3.82 M/UL (ref 3.8–5.2)
RBC #/AREA URNS HPF: >100 /HPF (ref 0–5)
SAMPLES BEING HELD,HOLD: NORMAL
SODIUM SERPL-SCNC: 140 MMOL/L (ref 136–145)
SP GR UR REFRACTOMETRY: 1.03 (ref 1–1.03)
TIBC SERPL-MCNC: 332 UG/DL (ref 250–450)
TRIGL SERPL-MCNC: 84 MG/DL (ref ?–150)
TROPONIN I SERPL-MCNC: 0.06 NG/ML
TROPONIN I SERPL-MCNC: 0.06 NG/ML
TROPONIN I SERPL-MCNC: 0.08 NG/ML
TSH SERPL DL<=0.05 MIU/L-ACNC: 0.58 UIU/ML (ref 0.36–3.74)
UR CULT HOLD, URHOLD: NORMAL
UROBILINOGEN UR QL STRIP.AUTO: 1 EU/DL (ref 0.2–1)
VENTRICULAR RATE, ECG03: 67 BPM
VLDLC SERPL CALC-MCNC: 16.8 MG/DL
WBC # BLD AUTO: 6.2 K/UL (ref 3.6–11)
WBC URNS QL MICRO: ABNORMAL /HPF (ref 0–4)

## 2019-05-07 PROCEDURE — 85025 COMPLETE CBC W/AUTO DIFF WBC: CPT

## 2019-05-07 PROCEDURE — 36415 COLL VENOUS BLD VENIPUNCTURE: CPT

## 2019-05-07 PROCEDURE — 96366 THER/PROPH/DIAG IV INF ADDON: CPT

## 2019-05-07 PROCEDURE — 99285 EMERGENCY DEPT VISIT HI MDM: CPT

## 2019-05-07 PROCEDURE — 93005 ELECTROCARDIOGRAM TRACING: CPT

## 2019-05-07 PROCEDURE — 83036 HEMOGLOBIN GLYCOSYLATED A1C: CPT

## 2019-05-07 PROCEDURE — 83540 ASSAY OF IRON: CPT

## 2019-05-07 PROCEDURE — 74011250636 HC RX REV CODE- 250/636: Performed by: HOSPITALIST

## 2019-05-07 PROCEDURE — 85379 FIBRIN DEGRADATION QUANT: CPT

## 2019-05-07 PROCEDURE — 76830 TRANSVAGINAL US NON-OB: CPT

## 2019-05-07 PROCEDURE — 80053 COMPREHEN METABOLIC PANEL: CPT

## 2019-05-07 PROCEDURE — 74011000250 HC RX REV CODE- 250: Performed by: EMERGENCY MEDICINE

## 2019-05-07 PROCEDURE — 74011250637 HC RX REV CODE- 250/637: Performed by: NURSE PRACTITIONER

## 2019-05-07 PROCEDURE — 84443 ASSAY THYROID STIM HORMONE: CPT

## 2019-05-07 PROCEDURE — 96361 HYDRATE IV INFUSION ADD-ON: CPT

## 2019-05-07 PROCEDURE — 84484 ASSAY OF TROPONIN QUANT: CPT

## 2019-05-07 PROCEDURE — 80061 LIPID PANEL: CPT

## 2019-05-07 PROCEDURE — 74011250637 HC RX REV CODE- 250/637: Performed by: EMERGENCY MEDICINE

## 2019-05-07 PROCEDURE — 99218 HC RM OBSERVATION: CPT

## 2019-05-07 PROCEDURE — 76856 US EXAM PELVIC COMPLETE: CPT

## 2019-05-07 PROCEDURE — 73630 X-RAY EXAM OF FOOT: CPT

## 2019-05-07 PROCEDURE — 81025 URINE PREGNANCY TEST: CPT

## 2019-05-07 PROCEDURE — 94762 N-INVAS EAR/PLS OXIMTRY CONT: CPT

## 2019-05-07 PROCEDURE — 74011250637 HC RX REV CODE- 250/637: Performed by: HOSPITALIST

## 2019-05-07 PROCEDURE — 81001 URINALYSIS AUTO W/SCOPE: CPT

## 2019-05-07 PROCEDURE — 93306 TTE W/DOPPLER COMPLETE: CPT

## 2019-05-07 PROCEDURE — 96365 THER/PROPH/DIAG IV INF INIT: CPT

## 2019-05-07 PROCEDURE — 83735 ASSAY OF MAGNESIUM: CPT

## 2019-05-07 RX ORDER — SODIUM CHLORIDE 9 MG/ML
75 INJECTION, SOLUTION INTRAVENOUS CONTINUOUS
Status: DISCONTINUED | OUTPATIENT
Start: 2019-05-07 | End: 2019-05-08

## 2019-05-07 RX ORDER — POTASSIUM CHLORIDE 750 MG/1
40 TABLET, FILM COATED, EXTENDED RELEASE ORAL
Status: COMPLETED | OUTPATIENT
Start: 2019-05-07 | End: 2019-05-07

## 2019-05-07 RX ORDER — OXYCODONE AND ACETAMINOPHEN 5; 325 MG/1; MG/1
1 TABLET ORAL ONCE
Status: COMPLETED | OUTPATIENT
Start: 2019-05-07 | End: 2019-05-07

## 2019-05-07 RX ORDER — ACETAMINOPHEN 325 MG/1
650 TABLET ORAL
Status: DISCONTINUED | OUTPATIENT
Start: 2019-05-07 | End: 2019-05-09 | Stop reason: HOSPADM

## 2019-05-07 RX ORDER — IPRATROPIUM BROMIDE AND ALBUTEROL SULFATE 2.5; .5 MG/3ML; MG/3ML
3 SOLUTION RESPIRATORY (INHALATION)
Status: DISCONTINUED | OUTPATIENT
Start: 2019-05-07 | End: 2019-05-09 | Stop reason: HOSPADM

## 2019-05-07 RX ORDER — SODIUM CHLORIDE 0.9 % (FLUSH) 0.9 %
5-40 SYRINGE (ML) INJECTION AS NEEDED
Status: DISCONTINUED | OUTPATIENT
Start: 2019-05-07 | End: 2019-05-09 | Stop reason: HOSPADM

## 2019-05-07 RX ORDER — ACETAMINOPHEN 325 MG/1
650 TABLET ORAL
Status: DISCONTINUED | OUTPATIENT
Start: 2019-05-07 | End: 2019-05-08 | Stop reason: SDUPTHER

## 2019-05-07 RX ORDER — POTASSIUM CHLORIDE 14.9 MG/ML
10 INJECTION INTRAVENOUS
Status: DISPENSED | OUTPATIENT
Start: 2019-05-07 | End: 2019-05-07

## 2019-05-07 RX ORDER — SODIUM CHLORIDE 0.9 % (FLUSH) 0.9 %
5-40 SYRINGE (ML) INJECTION EVERY 8 HOURS
Status: DISCONTINUED | OUTPATIENT
Start: 2019-05-07 | End: 2019-05-09 | Stop reason: HOSPADM

## 2019-05-07 RX ADMIN — POTASSIUM CHLORIDE 10 MEQ: 200 INJECTION, SOLUTION INTRAVENOUS at 10:54

## 2019-05-07 RX ADMIN — ACETAMINOPHEN 650 MG: 325 TABLET ORAL at 13:17

## 2019-05-07 RX ADMIN — Medication 10 ML: at 21:17

## 2019-05-07 RX ADMIN — SODIUM CHLORIDE 75 ML/HR: 900 INJECTION, SOLUTION INTRAVENOUS at 10:06

## 2019-05-07 RX ADMIN — POTASSIUM CHLORIDE 10 MEQ: 200 INJECTION, SOLUTION INTRAVENOUS at 10:07

## 2019-05-07 RX ADMIN — Medication 10 ML: at 13:18

## 2019-05-07 RX ADMIN — POTASSIUM CHLORIDE 40 MEQ: 750 TABLET, EXTENDED RELEASE ORAL at 06:22

## 2019-05-07 RX ADMIN — LIDOCAINE HYDROCHLORIDE 40 ML: 20 SOLUTION ORAL; TOPICAL at 04:32

## 2019-05-07 RX ADMIN — OXYCODONE AND ACETAMINOPHEN 1 TABLET: 5; 325 TABLET ORAL at 21:17

## 2019-05-07 NOTE — ED NOTES
Pt aware urine sample is needed, states she voided when she changed her pad before coming to the room. Will notify nurse when she can void again.

## 2019-05-07 NOTE — CONSULTS
Date of  Admission: 5/7/2019  2:35 AM     Emmie Soto is a 34 y.o. female admitted for Dizziness [R42]  Subjective:  Asked to see for elevated trop. Pt with heavy menstrual bleeding for last several months. Few weeks ago started on hormones from gyn but thought making her feel bad so she stopped few days ago. Heavy bleeding started again and she had headache, lower abdominal pain and dizzyness which sounds like vertigo, not orthostasis. Gets pressure in her chest from time to time and has occasional problems swallowing. Has chronic bronchitis, worse with season change and pollen. Works as transporter at [employer] and occasionally stops for mild sob and to sip water. denies palpitations, syncope, orthopnea, paroxysmal nocturnal dyspnea, exertional chest pressure/discomfort, claudication, lower extremity edema. Cardiac risk factors: smoking/ tobacco exposure, hypertension. Assessment/Plan:  Trop is unexplained but symptoms are not consistent with acute coronary syndrome. At some point would recommend stress echo to further stratify given underlying risk factors, but with current complaints and hgb dropping this is not the time to proceed with that evaluation. Patient Active Problem List    Diagnosis Date Noted    Dizziness 05/07/2019    Elevated troponin 05/07/2019    Edema of lower extremity 06/29/2011    CERVANTES (dyspnea on exertion) 06/29/2011      None  Past Medical History:   Diagnosis Date    CERVANTES (dyspnea on exertion) 6/29/2011    Edema of lower extremity 6/29/2011    Hypertension     Respiratory abnormalities     bronchitis      History reviewed. No pertinent surgical history. No Known Allergies   History reviewed. No pertinent family history.    Current Facility-Administered Medications   Medication Dose Route Frequency    sodium chloride (NS) flush 5-40 mL  5-40 mL IntraVENous Q8H    sodium chloride (NS) flush 5-40 mL  5-40 mL IntraVENous PRN    acetaminophen (TYLENOL) tablet 650 mg  650 mg Oral Q4H PRN    albuterol-ipratropium (DUO-NEB) 2.5 MG-0.5 MG/3 ML  3 mL Nebulization Q4H PRN    0.9% sodium chloride infusion  75 mL/hr IntraVENous CONTINUOUS    potassium chloride 10 mEq in 50 ml IVPB  10 mEq IntraVENous Q1H    acetaminophen (TYLENOL) tablet 650 mg  650 mg Oral Q4H PRN     Current Outpatient Medications   Medication Sig    albuterol (PROVENTIL HFA, VENTOLIN HFA, PROAIR HFA) 90 mcg/actuation inhaler Take 1-2 Puffs by inhalation every four (4) hours as needed for Wheezing.  chlorthalidone (HYGROTEN) 25 mg tablet Take 1 Tab by mouth daily. Review of Symptoms:  A comprehensive review of systems was negative except for that written in the HPI. Physical Exam    Visit Vitals  /77   Pulse 73   Temp 99.1 °F (37.3 °C)   Resp 24   SpO2 100%     Neck: supple, symmetrical, trachea midline, no adenopathy, no carotid bruit and no JVD  Heart: regular rate and rhythm, S1, S2 normal, no murmur, click, rub or gallop  Lungs: clear to auscultation bilaterally  Abdomen: soft, non-tender. Bowel sounds normal. No masses,  no organomegaly  Extremities: extremities normal, atraumatic, no cyanosis or edema  Pulses: 2+ and symmetric  Neurologic: Grossly normal    Cardiographics    Telemetry: normal sinus rhythm  ECG: normal sinus rhythm, 1st degree AV block  Echocardiogram: Not done    Recent radiology, intake/output and wt reviewed    Labs:   Recent Results (from the past 24 hour(s))   SAMPLES BEING HELD    Collection Time: 05/07/19  2:31 AM   Result Value Ref Range    SAMPLES BEING HELD 1RED 1BLUE     COMMENT        Add-on orders for these samples will be processed based on acceptable specimen integrity and analyte stability, which may vary by analyte.    CBC WITH AUTOMATED DIFF    Collection Time: 05/07/19  2:31 AM   Result Value Ref Range    WBC 6.2 3.6 - 11.0 K/uL    RBC 3.82 3.80 - 5.20 M/uL    HGB 10.9 (L) 11.5 - 16.0 g/dL    HCT 33.6 (L) 35.0 - 47.0 %    MCV 88.0 80.0 - 99.0 FL MCH 28.5 26.0 - 34.0 PG    MCHC 32.4 30.0 - 36.5 g/dL    RDW 14.0 11.5 - 14.5 %    PLATELET 586 936 - 452 K/uL    MPV 10.3 8.9 - 12.9 FL    NRBC 0.0 0  WBC    ABSOLUTE NRBC 0.00 0.00 - 0.01 K/uL    NEUTROPHILS 51 32 - 75 %    LYMPHOCYTES 41 12 - 49 %    MONOCYTES 5 5 - 13 %    EOSINOPHILS 3 0 - 7 %    BASOPHILS 0 0 - 1 %    IMMATURE GRANULOCYTES 0 0.0 - 0.5 %    ABS. NEUTROPHILS 3.1 1.8 - 8.0 K/UL    ABS. LYMPHOCYTES 2.6 0.8 - 3.5 K/UL    ABS. MONOCYTES 0.3 0.0 - 1.0 K/UL    ABS. EOSINOPHILS 0.2 0.0 - 0.4 K/UL    ABS. BASOPHILS 0.0 0.0 - 0.1 K/UL    ABS. IMM. GRANS. 0.0 0.00 - 0.04 K/UL    DF AUTOMATED     METABOLIC PANEL, COMPREHENSIVE    Collection Time: 05/07/19  2:31 AM   Result Value Ref Range    Sodium 140 136 - 145 mmol/L    Potassium 2.8 (L) 3.5 - 5.1 mmol/L    Chloride 105 97 - 108 mmol/L    CO2 29 21 - 32 mmol/L    Anion gap 6 5 - 15 mmol/L    Glucose 141 (H) 65 - 100 mg/dL    BUN 13 6 - 20 MG/DL    Creatinine 1.02 0.55 - 1.02 MG/DL    BUN/Creatinine ratio 13 12 - 20      GFR est AA >60 >60 ml/min/1.73m2    GFR est non-AA >60 >60 ml/min/1.73m2    Calcium 9.3 8.5 - 10.1 MG/DL    Bilirubin, total 0.2 0.2 - 1.0 MG/DL    ALT (SGPT) 54 12 - 78 U/L    AST (SGOT) 20 15 - 37 U/L    Alk.  phosphatase 65 45 - 117 U/L    Protein, total 7.3 6.4 - 8.2 g/dL    Albumin 3.3 (L) 3.5 - 5.0 g/dL    Globulin 4.0 2.0 - 4.0 g/dL    A-G Ratio 0.8 (L) 1.1 - 2.2     TROPONIN I    Collection Time: 05/07/19  2:31 AM   Result Value Ref Range    Troponin-I, Qt. 0.06 (H) <0.05 ng/mL   D DIMER    Collection Time: 05/07/19  2:31 AM   Result Value Ref Range    D-dimer 0.46 0.00 - 0.65 mg/L FEU   HEMOGLOBIN A1C WITH EAG    Collection Time: 05/07/19  2:31 AM   Result Value Ref Range    Hemoglobin A1c 6.3 4.2 - 6.3 %    Est. average glucose 134 mg/dL   EKG, 12 LEAD, INITIAL    Collection Time: 05/07/19  4:30 AM   Result Value Ref Range    Ventricular Rate 67 BPM    Atrial Rate 67 BPM    P-R Interval 222 ms    QRS Duration 88 ms Q-T Interval 408 ms    QTC Calculation (Bezet) 431 ms    Calculated P Axis 54 degrees    Calculated R Axis 80 degrees    Calculated T Axis 41 degrees    Diagnosis       Sinus rhythm with sinus arrhythmia with 1st degree AV block  When compared with ECG of 29-JUN-2017 13:04,  No significant change was found     HCG URINE, QL. - POC    Collection Time: 05/07/19  5:20 AM   Result Value Ref Range    Pregnancy test,urine (POC) NEGATIVE  NEG     URINALYSIS W/MICROSCOPIC    Collection Time: 05/07/19  5:22 AM   Result Value Ref Range    Color RED      Appearance TURBID (A) CLEAR      Specific gravity 1.028 1.003 - 1.030      pH (UA) 7.0 5.0 - 8.0      Protein 30 (A) NEG mg/dL    Glucose NEGATIVE  NEG mg/dL    Ketone TRACE (A) NEG mg/dL    Bilirubin NEGATIVE  NEG      Blood LARGE (A) NEG      Urobilinogen 1.0 0.2 - 1.0 EU/dL    Nitrites NEGATIVE  NEG      Leukocyte Esterase SMALL (A) NEG      WBC 0-4 0 - 4 /hpf    RBC >100 (H) 0 - 5 /hpf    Epithelial cells FEW FEW /lpf    Bacteria NEGATIVE  NEG /hpf   URINE CULTURE HOLD SAMPLE    Collection Time: 05/07/19  5:22 AM   Result Value Ref Range    Urine culture hold        URINE ON HOLD IN MICROBIOLOGY DEPT FOR 3 DAYS. IF UNPRESERVED URINE IS SUBMITTED, IT CANNOT BE USED FOR ADDITIONAL TESTING AFTER 24 HRS, RECOLLECTION WILL BE REQUIRED.    TROPONIN I    Collection Time: 05/07/19  6:26 AM   Result Value Ref Range    Troponin-I, Qt. 0.08 (H) <0.05 ng/mL

## 2019-05-07 NOTE — ROUTINE PROCESS
TRANSFER - OUT REPORT:    Verbal report given to DAVID Seymour (name) on Patience Petersen  being transferred to  (unit) for routine progression of care       Report consisted of patients Situation, Background, Assessment and   Recommendations(SBAR). Information from the following report(s) SBAR, Kardex, ED Summary and Recent Results was reviewed with the receiving nurse. Lines:   Peripheral IV 05/07/19 Left Antecubital (Active)   Site Assessment Clean, dry, & intact 5/7/2019  2:50 AM   Phlebitis Assessment 0 5/7/2019  2:50 AM   Infiltration Assessment 0 5/7/2019  2:50 AM   Dressing Status Clean, dry, & intact 5/7/2019  2:50 AM   Dressing Type Transparent 5/7/2019  2:50 AM   Hub Color/Line Status Pink;Patent; Flushed 5/7/2019  2:50 AM   Action Taken Blood drawn 5/7/2019  2:50 AM        Opportunity for questions and clarification was provided.       Patient transported with:  Transport

## 2019-05-07 NOTE — PROGRESS NOTES
Admission Medication Reconciliation:    Information obtained from: Patient     Significant PMH/Disease States:   Past Medical History:   Diagnosis Date    CERVANTES (dyspnea on exertion) 6/29/2011    Edema of lower extremity 6/29/2011    Hypertension     Respiratory abnormalities     bronchitis       Chief Complaint for this Admission:  Dizziness    Allergies:  Patient has no known allergies. Prior to Admission Medications:   Prior to Admission Medications   Prescriptions Last Dose Informant Patient Reported? Taking? albuterol (PROVENTIL HFA, VENTOLIN HFA, PROAIR HFA) 90 mcg/actuation inhaler   No Yes   Sig: Take 1-2 Puffs by inhalation every four (4) hours as needed for Wheezing. chlorthalidone (HYGROTEN) 25 mg tablet 5/6/2019 at Unknown time  No Yes   Sig: Take 1 Tab by mouth daily. Facility-Administered Medications: None         Comments/Recommendations: Medication history completed after speaking to the patient. Chlorthalidone is the only medication she takes on a regular basis. She was taking BCP but this was stopped due to possible allergic rxn(throat closing?).     Luis Fernando Murillo, PharmD

## 2019-05-07 NOTE — ED NOTES
Bedside and Verbal shift change report given to Pati Jauregui RN (oncoming nurse) by Miguel Vega RN (offgoing nurse). Report included the following information SBAR, ED Summary, Intake/Output, MAR, Recent Results and Cardiac Rhythm NSR.

## 2019-05-07 NOTE — PROGRESS NOTES
1446 - TRANSFER - IN REPORT:    Verbal report received from Hans Koch (name) on Cecy Brandt  being received from ED (unit) for routine progression of care      Report consisted of patients Situation, Background, Assessment and   Recommendations(SBAR). Information from the following report(s) SBAR, Kardex, Recent Results and Cardiac Rhythm NSR was reviewed with the receiving nurse. Opportunity for questions and clarification was provided. 1520 - Pt arrived to rm 352. She states she is not as dizzy as before but still feels weak. Vitals obtained, WNL. Primary Nurse Kelsey Torres RN and Antonio Pablo RN performed a dual skin assessment on this patient. No impairment noted. Gera score is 21.    1940 - Bedside and Verbal shift change report given to [address] (oncoming nurse) by Jaxon Valdez RN (offgoing nurse). Report included the following information SBAR, Kardex, ED Summary, Recent Results and Cardiac Rhythm NSR.

## 2019-05-07 NOTE — ED TRIAGE NOTES
Patient presents to the emergency department reporting dizziness onset Sunday. Patient states she has been having a menstrual cycle for about a month. Patient was seen by her OB/GYN last week, and was prescribed a medication to slow the bleeding. Patient reports the bleeding stopped, but she could not tolerated the medication, and stopped taking it. Patient reports she is now bleeding very heavily. Patient reports going through a pad an hour.

## 2019-05-07 NOTE — ED PROVIDER NOTES
HPI     34year old female with history of CERVANTES, low extrem edema, HTN, bronchitis, presents with dizziness and lightheadedness. Started yesterday. Having heavy periods, 1-2 pads/hour. Headache. Chest pain that feels like indigestion with SOB. Burning feeling. No nasuea or diaphoresis. Smokes. Has norplant. Dizziness is with standing. Taking once a day womens vitamens. Past Medical History:   Diagnosis Date    CERVANTES (dyspnea on exertion) 6/29/2011    Edema of lower extremity 6/29/2011    Hypertension     Respiratory abnormalities     bronchitis       History reviewed. No pertinent surgical history. History reviewed. No pertinent family history.     Social History     Socioeconomic History    Marital status: SINGLE     Spouse name: Not on file    Number of children: Not on file    Years of education: Not on file    Highest education level: Not on file   Occupational History    Not on file   Social Needs    Financial resource strain: Not on file    Food insecurity:     Worry: Not on file     Inability: Not on file    Transportation needs:     Medical: Not on file     Non-medical: Not on file   Tobacco Use    Smoking status: Current Every Day Smoker     Packs/day: 0.25    Smokeless tobacco: Never Used   Substance and Sexual Activity    Alcohol use: Yes     Comment: occasionally    Drug use: No    Sexual activity: Yes     Partners: Male   Lifestyle    Physical activity:     Days per week: Not on file     Minutes per session: Not on file    Stress: Not on file   Relationships    Social connections:     Talks on phone: Not on file     Gets together: Not on file     Attends Buddhism service: Not on file     Active member of club or organization: Not on file     Attends meetings of clubs or organizations: Not on file     Relationship status: Not on file    Intimate partner violence:     Fear of current or ex partner: Not on file     Emotionally abused: Not on file     Physically abused: Not on file     Forced sexual activity: Not on file   Other Topics Concern    Not on file   Social History Narrative    ** Merged History Encounter **              ALLERGIES: Patient has no known allergies. Review of Systems   Constitutional: Negative for fever. HENT: Negative for congestion. Eyes: Negative for visual disturbance. Respiratory: Positive for shortness of breath. Cardiovascular: Positive for chest pain. Gastrointestinal: Negative for abdominal pain, diarrhea, nausea and vomiting. Genitourinary: Positive for vaginal bleeding. Negative for dysuria. Musculoskeletal: Negative for gait problem. Skin: Negative for rash. Neurological: Positive for light-headedness. Psychiatric/Behavioral: Negative for dysphoric mood. Vitals:    05/07/19 0035 05/07/19 0251 05/07/19 0526   BP: 138/88 134/76 128/73   Pulse: 83 78 70   Resp: 18 16 14   Temp: 99 °F (37.2 °C) 98.4 °F (36.9 °C) 98.2 °F (36.8 °C)   SpO2: 99% 100% 100%            Physical Exam   Constitutional: She is oriented to person, place, and time. She appears well-developed and well-nourished. No distress. HENT:   Head: Normocephalic and atraumatic. Mouth/Throat: No oropharyngeal exudate. Eyes: Pupils are equal, round, and reactive to light. Right eye exhibits no discharge. Left eye exhibits no discharge. No scleral icterus. Neck: Normal range of motion. Neck supple. No JVD present. Cardiovascular: Normal rate, regular rhythm and normal heart sounds. No murmur heard. Pulmonary/Chest: Effort normal and breath sounds normal. No stridor. No respiratory distress. She has no wheezes. She has no rales. She exhibits no tenderness. Abdominal: Soft. Bowel sounds are normal. She exhibits no distension and no mass. There is no tenderness. There is no rebound and no guarding. Musculoskeletal: Normal range of motion. Neurological: She is oriented to person, place, and time. Skin: Skin is warm and dry.  Capillary refill takes less than 2 seconds. No rash noted. Psychiatric: She has a normal mood and affect. Her behavior is normal. Judgment and thought content normal.        MDM       Procedures        ED EKG interpretation:  Rhythm: normal sinus rhythm; and regular . Rate (approx.): 67; Axis: normal; P wave: normal; QRS interval: normal ; ST/T wave: non-specific changes;. This EKG was interpreted by Heather Dickens MD,ED Provider. Work up reassuring. Troponin 0.06 with non ischemic EKG. WIll repeat troponin. Repeat troponin 0.08. Given htn, smoker, obese female with cp/sob and dizziness, will admit  for cardiac work up. Worthing texted with hospitalist. Patient agreeable with plan. No pain currently.

## 2019-05-07 NOTE — H&P
[address] HISTORY AND PHYSICAL Name:  Suma Martinez 
MR#:  [id number] :  1985 ACCOUNT #:  [de-identified] ADMIT DATE:  2019 PRIMARY CARE PROVIDER:  None. SOURCE OF INFORMATION:  The patient. CHIEF COMPLAINT:  Dizziness since yesterday. HISTORY OF PRESENTING ILLNESS:  This is a 28-year-old [facility] American woman with past medical history significant for hypertension and dyspnea on exertion, presented to [facility] Emergency Department with progressive dizziness since yesterday. She stated that she started to have dizziness while she was at work. She felt lightheadedness, worse with movement, and associated with on and off headache, 7 out of 10. She described her headache like dull headache in the middle of her head. No aggravating or relieving factor. She has also epigastric area burning sensation on and off and no aggravating or relieving factor. No fevers, chills or sweating, cough, diaphoresis, left-sided chest pain, abdominal pain, urinary complaint, or abnormal bowel movement. She has dyspnea on exertion and smoked 2 packs over 2-3 days. Occasionally, drinks alcohol. Her recent cycle lasted for 3 weeks, still on period. She changes on average 6 to 7 pads and was seen by gynecologist.  In ER, her vital signs, blood pressure was 178/88, temperature 99, pulse 83, saturation of oxygen 99% on room air. Urine pregnancy test negative. D-dimer 0.46. Transvaginal and abdominal ultrasound was done and normal appearance of the uterus and ovaries. The patient's troponin elevated and referred to hospitalist service for further evaluation and admission. REVIEW OF SYSTEMS:  Pertinent positive findings mentioned in HPI. All systems reviewed, no any other positive findings. PAST MEDICAL HISTORY: 
1. Hypertension. 2.  Dyspnea on exertion. 3.  Lower extremity edema. 4.  Bronchitis.  
 
PRIOR TO ADMISSION MEDICATIONS:  Include, 
 1.  Chlorthalidone 25 mg p.o. daily. 2.  Albuterol 1 to 2 puffs by inhalation every 4 hours as needed. ALLERGIES:  NO KNOWN DRUG ALLERGIES. SOCIAL HISTORY:  Lives with her family and kids. She smokes 1 pack over 2-3 days. Occasionally drinks alcohol. Ambulates independently. CODE STATUS:  Full code. FAMILY HISTORY:  Mother with history of congestive heart failure. She does not know about her father. PHYSICAL EXAMINATION: 
VITAL SIGNS:  Blood pressure 178/69, pulse 70, temperature 98.2, respiratory rate 16, saturation of oxygen 98%. GENERAL APPEARANCE:  The patient is alert, cooperative, not in distress. She appears her stated age. HEENT:  Pink conjunctivae. Anicteric sclerae. Moist tongue and buccal mucosa. LUNGS:  Clear to auscultation bilaterally. CHEST WALL:  No tenderness or deformity. CARDIOVASCULAR SYSTEM:  Regular rate and rhythm. S1 and S2 normal.  No murmur or gallop. EXTREMITIES:  No cyanosis or edema. There is swelling on the left mid dorsal foot. SKIN:  No rash or lesion. CENTRAL NERVOUS SYSTEM:  Conscious, well oriented to time, place, and person. Motor 5/5. Sensation intact. Cranial nerves II through XII grossly intact. LABORATORY DATA:  EKG:  Sinus rhythm with sinus arrhythmia with first-degree AV block. Ventricular rate 67 beats per minute, nonspecific ST-T wave. No significant EKG change comparing to 06/29/2017. White blood cell count 6.2, hemoglobin 10.9, hematocrit 33.6, MCV 88.0, platelet count 393. Urinalysis:  No evidence of infection, rbc more than 1000. D-dimer 0.46. Pregnancy test negative. Chemistry:  Sodium 140, potassium 2.8, chloride 108, CO2 of 29, anion gap 6, glucose 141, BUN 17, creatinine 1.02, BUN/creatinine ratio 17, calcium 9.3. Total protein 7.3, albumin 3.3, ALT 54, AST 20, alkaline phosphatase 65. Troponin initial 0.06, repeated 1.08. 
 
ASSESSMENT: 
1.  Dizziness, unclear etiology. Elevated troponin. 2.  Anemia secondary to menorrhagia. 3.  Hypokalemia. 4.  Hyperglycemia. 5.  Hypertension. 6.  Left foot swelling. 7.  Obesity. 8.  Tobacco abuse. PLAN: 
1. Dizziness, unclear etiology. Admit the patient under observation to telemetry service. The patient's dizziness could be related to her recent menorrhagia and a drop in hemoglobin. will check orthostatic blood pressure. hold her home chlorthalidone. D-dimer normal.  No neurologic deficit.  will give her normal saline. Monitor intake and output and repeat cardiac monitoring. 2.  Elevated troponin. No left-sided chest pain. No EKG change, but the patient has dyspnea on exertion. will repeat 1 more set of troponin, EKG. will check her lipid panel, echocardiography, and will consult cardiologist. 
3.  Anemia secondary to menorrhagia. Ultrasound of uterus, normal appearance of uterus and ovaries. We will check her iron profile. Monitor H and H. Need to see gynecologist as an outpatient. If she continued to have bleeding, we may involve Gynecology. 4.  Hypokalemia. Replace with IV KCl and repeat in a.m. 
5.  Hyperglycemia. No history of diabetes mellitus type 2. We will check her hemoglobin A1c. 
6.  Hypertension. The patient with dizziness, hold chlorthalidone. Check orthostatic blood pressure. Monitor blood pressure. 7.  Left midfoot swelling for 3 to 4 months. No history of trauma. We will do x-ray of the left foot. P.r.n. Tylenol. 8.  Obesity. Diet and weight loss program. 
9.  Tobacco.  The patient is advised to stop smoking. Deep vein thrombosis prophylaxis. Sequential compressive device. The patient has heavy period and hold anticoagulation now. Functional status prior to admission, the patient independently ambulates. MD ROD Castle/[id number] 
D:  05/07/2019 8:41 T:  05/07/2019 11:14 
JOB #:  [id number]

## 2019-05-08 LAB
ALBUMIN SERPL-MCNC: 3.2 G/DL (ref 3.5–5)
ALBUMIN/GLOB SERPL: 0.9 {RATIO} (ref 1.1–2.2)
ALP SERPL-CCNC: 60 U/L (ref 45–117)
ALT SERPL-CCNC: 46 U/L (ref 12–78)
ANION GAP SERPL CALC-SCNC: 8 MMOL/L (ref 5–15)
AST SERPL-CCNC: 18 U/L (ref 15–37)
BILIRUB SERPL-MCNC: 0.2 MG/DL (ref 0.2–1)
BUN SERPL-MCNC: 16 MG/DL (ref 6–20)
BUN/CREAT SERPL: 21 (ref 12–20)
CALCIUM SERPL-MCNC: 9.1 MG/DL (ref 8.5–10.1)
CHLORIDE SERPL-SCNC: 107 MMOL/L (ref 97–108)
CO2 SERPL-SCNC: 27 MMOL/L (ref 21–32)
CREAT SERPL-MCNC: 0.78 MG/DL (ref 0.55–1.02)
ERYTHROCYTE [DISTWIDTH] IN BLOOD BY AUTOMATED COUNT: 13.7 % (ref 11.5–14.5)
GLOBULIN SER CALC-MCNC: 3.6 G/DL (ref 2–4)
GLUCOSE SERPL-MCNC: 125 MG/DL (ref 65–100)
HCT VFR BLD AUTO: 32.5 % (ref 35–47)
HGB BLD-MCNC: 10.5 G/DL (ref 11.5–16)
MCH RBC QN AUTO: 29 PG (ref 26–34)
MCHC RBC AUTO-ENTMCNC: 32.3 G/DL (ref 30–36.5)
MCV RBC AUTO: 89.8 FL (ref 80–99)
NRBC # BLD: 0 K/UL (ref 0–0.01)
NRBC BLD-RTO: 0 PER 100 WBC
PLATELET # BLD AUTO: 194 K/UL (ref 150–400)
PMV BLD AUTO: 10.3 FL (ref 8.9–12.9)
POTASSIUM SERPL-SCNC: 4.5 MMOL/L (ref 3.5–5.1)
PROT SERPL-MCNC: 6.8 G/DL (ref 6.4–8.2)
RBC # BLD AUTO: 3.62 M/UL (ref 3.8–5.2)
SODIUM SERPL-SCNC: 142 MMOL/L (ref 136–145)
WBC # BLD AUTO: 4.9 K/UL (ref 3.6–11)

## 2019-05-08 PROCEDURE — 94760 N-INVAS EAR/PLS OXIMETRY 1: CPT

## 2019-05-08 PROCEDURE — 99218 HC RM OBSERVATION: CPT

## 2019-05-08 PROCEDURE — 85027 COMPLETE CBC AUTOMATED: CPT

## 2019-05-08 PROCEDURE — 96361 HYDRATE IV INFUSION ADD-ON: CPT

## 2019-05-08 PROCEDURE — 36415 COLL VENOUS BLD VENIPUNCTURE: CPT

## 2019-05-08 PROCEDURE — 74011250637 HC RX REV CODE- 250/637: Performed by: OBSTETRICS & GYNECOLOGY

## 2019-05-08 PROCEDURE — 77030032490 HC SLV COMPR SCD KNE COVD -B

## 2019-05-08 PROCEDURE — 74011250637 HC RX REV CODE- 250/637: Performed by: NURSE PRACTITIONER

## 2019-05-08 PROCEDURE — 74011250636 HC RX REV CODE- 250/636: Performed by: HOSPITALIST

## 2019-05-08 RX ORDER — IBUPROFEN 400 MG/1
400 TABLET ORAL 3 TIMES DAILY
Status: DISCONTINUED | OUTPATIENT
Start: 2019-05-08 | End: 2019-05-09 | Stop reason: HOSPADM

## 2019-05-08 RX ORDER — PROMETHAZINE HYDROCHLORIDE 25 MG/1
12.5 TABLET ORAL
Status: DISCONTINUED | OUTPATIENT
Start: 2019-05-08 | End: 2019-05-09 | Stop reason: HOSPADM

## 2019-05-08 RX ORDER — OXYCODONE AND ACETAMINOPHEN 5; 325 MG/1; MG/1
1 TABLET ORAL
Status: DISCONTINUED | OUTPATIENT
Start: 2019-05-08 | End: 2019-05-09 | Stop reason: HOSPADM

## 2019-05-08 RX ADMIN — OXYCODONE AND ACETAMINOPHEN 1 TABLET: 5; 325 TABLET ORAL at 15:51

## 2019-05-08 RX ADMIN — ESTROGENS, CONJUGATED 1.25 MG: 0.62 TABLET, FILM COATED ORAL at 17:29

## 2019-05-08 RX ADMIN — IBUPROFEN 600 MG: 400 TABLET ORAL at 00:34

## 2019-05-08 RX ADMIN — OXYCODONE AND ACETAMINOPHEN 1 TABLET: 5; 325 TABLET ORAL at 05:01

## 2019-05-08 RX ADMIN — OXYCODONE AND ACETAMINOPHEN 1 TABLET: 5; 325 TABLET ORAL at 20:23

## 2019-05-08 RX ADMIN — IBUPROFEN 400 MG: 400 TABLET ORAL at 17:29

## 2019-05-08 RX ADMIN — IBUPROFEN 400 MG: 400 TABLET ORAL at 20:57

## 2019-05-08 RX ADMIN — SODIUM CHLORIDE 75 ML/HR: 900 INJECTION, SOLUTION INTRAVENOUS at 05:01

## 2019-05-08 RX ADMIN — OXYCODONE AND ACETAMINOPHEN 1 TABLET: 5; 325 TABLET ORAL at 11:43

## 2019-05-08 RX ADMIN — Medication 10 ML: at 15:51

## 2019-05-08 NOTE — PROGRESS NOTES
Hospitalist Progress Note          Pineda Cody NP  Please call  and page for questions. Call physician on-call through the  7pm-7am    Daily Progress Note: 2019    Primary care provider:None    Date of admission: 2019  2:35 AM    Admission summary and hospital course:  Pt is a 34 y.o.  female  (one set of twins) with a history of abnormal uterine bleeding last month and she was put on OCPs. She was supposed to take them for 2 months but stopped taking them half way through the first pack as she started not feeling well having chest discomfort and nausea. She reported to ED w/ dizziness, weakness and HA and was found to have an elevated troponin and was admitted for further workup. She was seen by Cardiology and outpatient f/u recommended. Seen by gynecology and started on estrogen. Subjective:   Patient reports feeling better today but still having some dizziness. She Reports that she continues to have heavy bleeding going through \"several pads\" but is vague when asked how many. She still has some chest tightness on occasion per her report but none at this time. Assessment/Plan:     Dizziness, unclear etiology   -Pt admitted as obs tele  -Likely r/t menorrhagia and drop in Hgb  -Orthostatics negative - stop IVF  -Hgb stable at 10.5 (10.9 on admission)    Elevated troponin  -No left-sided chest pain, no EKG change  -Denies CERVANTES at this time  -Trops: 0.06-0.08-0.06  -Echo  shows moderately increased L wall thickness w/ EF 61-65% and trivial pericardial effusion  -Seen by cardiology-f/u in 2 weeks for stress echo    Anemia secondary to menorrhagia  -Ultrasound of uterus, normal appearance of uterus and ovaries.     -Iron profile unremarkable  -H&H stable  -Seen by GYN today, started on estrogen    Hypokalemia-resolved    Hyperglycemia  -No history of diabetes mellitus type 2  -A1c 6.3-f/u with PCP for close monitoring    Hypertension  -Hold home chlorthalidone  -Orthostatics negative  -Monitor    Left midfoot swelling for 3 to 4 months  -No history of trauma. -Xray 5/7 negative  -Tylenol for pain prn    Tobacco  -The patient is counseled and advised to stop smoking.       See orders for other plans. VTE prophylaxis: SCDs  Code status: Full Code  Discussed plan of care with Patient/Family and Nurse. Pre-admission lived at home. Discharge planning: Plan to discharge in AM if Hgb remains stable       Review of Systems:     Review of Systems:  Symptom  Y/N  Comments   Symptom  Y/N  Comments    Fever/Chills  N    Chest Pain  Y occasional   Poor Appetite  N    Edema  Y  Left foot   Cough  N   Abdominal Pain   Y Menstrual cramps   Sputum  N   Joint Pain  N    SOB/CERVANTES  N   Pruritis/Rash  N    Nausea/vomit  N   Tolerating PT/OT      Diarrhea  N   Tolerating Diet  Y    Constipation  N   Other      Could not obtain due to:         Objective:   Physical Exam:     Visit Vitals  /87 (BP 1 Location: Right arm, BP Patient Position: Standing)   Pulse 68   Temp 98.1 °F (36.7 °C)   Resp 16   Wt 109 kg (240 lb 6.4 oz)   SpO2 100%   BMI 40.00 kg/m²      O2 Device: Room air    Temp (24hrs), Av.2 °F (36.8 °C), Min:98.1 °F (36.7 °C), Max:98.5 °F (36.9 °C)     07 -  1900  In: 480 [P.O.:480]  Out: -    No intake/output data recorded. General:  Alert, cooperative, no distress, appears stated age. Lungs:   Clear to auscultation bilaterally. Heart:  Regular rate and rhythm, S1, S2 normal, no murmur, click, rub or gallop. Abdomen:   Soft, non-tender. Bowel sounds normal   Extremities: Extremities normal, atraumatic, no cyanosis, +1 edema Lt foot   Pulses: 2+ and symmetric all extremities. Skin: Skin color, texture, turgor normal. No rashes or lesions   Neurologic: CNII-XII intact.       Data Review:       Recent Days:  Recent Labs     19  0420 19  0231   WBC 4.9 6.2   HGB 10.5* 10.9*   HCT 32.5* 33.6*    217     Recent Labs     05/07/19  1053 05/07/19  0231    140   K 4.5 2.8*    105   CO2 27 29   * 141*   BUN 16 13   CREA 0.78 1.02   CA 9.1 9.3   MG 2.0  --    ALB 3.2* 3.3*   SGOT 18 20   ALT 46 54     No results for input(s): PH, PCO2, PO2, HCO3, FIO2 in the last 72 hours.     24 Hour Results:  Recent Results (from the past 24 hour(s))   CBC W/O DIFF    Collection Time: 05/08/19  4:20 AM   Result Value Ref Range    WBC 4.9 3.6 - 11.0 K/uL    RBC 3.62 (L) 3.80 - 5.20 M/uL    HGB 10.5 (L) 11.5 - 16.0 g/dL    HCT 32.5 (L) 35.0 - 47.0 %    MCV 89.8 80.0 - 99.0 FL    MCH 29.0 26.0 - 34.0 PG    MCHC 32.3 30.0 - 36.5 g/dL    RDW 13.7 11.5 - 14.5 %    PLATELET 171 142 - 879 K/uL    MPV 10.3 8.9 - 12.9 FL    NRBC 0.0 0  WBC    ABSOLUTE NRBC 0.00 0.00 - 0.01 K/uL       Problem List:  Problem List as of 5/8/2019 Date Reviewed: 5/7/2019          Codes Class Noted - Resolved    * (Principal) Dizziness ICD-10-CM: R42  ICD-9-CM: 780.4  5/7/2019 - Present        Elevated troponin ICD-10-CM: R74.8  ICD-9-CM: 790.6  5/7/2019 - Present        Edema of lower extremity ICD-10-CM: R60.0  ICD-9-CM: 782.3  6/29/2011 - Present        CERVANTES (dyspnea on exertion) ICD-10-CM: R06.09  ICD-9-CM: 786.09  6/29/2011 - Present              Medications reviewed  Current Facility-Administered Medications   Medication Dose Route Frequency    oxyCODONE-acetaminophen (PERCOCET) 5-325 mg per tablet 1 Tab  1 Tab Oral Q4H PRN    conjugated estrogens (PREMARIN) tablet 1.25 mg  1.25 mg Oral DAILY    ibuprofen (MOTRIN) tablet 400 mg  400 mg Oral TID    promethazine (PHENERGAN) tablet 12.5 mg  12.5 mg Oral Q6H PRN    sodium chloride (NS) flush 5-40 mL  5-40 mL IntraVENous Q8H    sodium chloride (NS) flush 5-40 mL  5-40 mL IntraVENous PRN    acetaminophen (TYLENOL) tablet 650 mg  650 mg Oral Q4H PRN    albuterol-ipratropium (DUO-NEB) 2.5 MG-0.5 MG/3 ML  3 mL Nebulization Q4H PRN       Care Plan discussed with: Patient/Family, Nurse and Other Dr. Amita Mantilla  Total time spent with patient: 30 minutes. Sarah Hernandez NP  Hospitalist  Providers can reach me directly at [phone] or I can be found on [employer]      Attending attestation:   Patient seen and examined. Agree with plan as outlined by Sarah Hernandez NP. Patient with dizziness and elevated troponin. Cardiology consulted, recommend outpatient work-up. Orthostatics negative. OBGYN for menorrhagia.     Eli Bey, DO

## 2019-05-08 NOTE — ROUTINE PROCESS
TRANSFER - OUT REPORT:    Verbal report given to Ximena(name) on Emmie Soto  being transferred to [address] [place] [address](unit) for routine progression of care       Report consisted of patients Situation, Background, Assessment and   Recommendations(SBAR). Information from the following report(s) SBAR, Intake/Output, MAR, Recent Results, Med Rec Status and Cardiac Rhythm NSR was reviewed with the receiving nurse. Lines:   Peripheral IV 05/07/19 Left Antecubital (Active)   Site Assessment Clean, dry, & intact 5/7/2019  3:20 PM   Phlebitis Assessment 0 5/7/2019  3:20 PM   Infiltration Assessment 0 5/7/2019  3:20 PM   Dressing Status Clean, dry, & intact 5/7/2019  3:20 PM   Dressing Type Transparent;Tape 5/7/2019  3:20 PM   Hub Color/Line Status Pink; Infusing;Patent 5/7/2019  3:20 PM   Action Taken Open ports on tubing capped 5/7/2019  3:20 PM   Alcohol Cap Used Yes 5/7/2019  3:20 PM        Opportunity for questions and clarification was provided.       Patient transported with:   Registered Nurse  Tech

## 2019-05-08 NOTE — PROGRESS NOTES
NUTRITION       Nutrition screening referral was triggered based on results obtained during nursing admission assessment for unsure wt loss. The patient's chart was reviewed and nutrition assessment is not indicated at this time. No wt loss noted and no reports of decreased intake. Wt Readings from Last 10 Encounters:   05/08/19 109 kg (240 lb 6.4 oz)   02/27/19 110.5 kg (243 lb 8 oz)   01/06/19 115.7 kg (255 lb)   12/26/17 104.3 kg (230 lb)   10/02/17 110.6 kg (243 lb 12.8 oz)   06/29/17 84.8 kg (187 lb)   03/25/15 93.4 kg (206 lb)   06/14/14 99.8 kg (220 lb)   10/31/13 93.9 kg (207 lb)   09/28/12 94.8 kg (209 lb)      Plan to see patient for rescreen as indicated. Thank you.      Juan J Sharma RD

## 2019-05-08 NOTE — PROGRESS NOTES
Cardiology Progress Note            Admit Date: 5/7/2019  Admit Diagnosis: Dizziness [R42]  Date: 5/8/2019     Time: 12:43 PM    HPI: Asked to see for elevated trop. Pt with heavy menstrual bleeding for last several months. Few weeks ago started on hormones from gyn but thought making her feel bad so she stopped few days ago. Heavy bleeding started again and she had headache, lower abdominal pain and dizzyness which sounds like vertigo, not orthostasis. Gets pressure in her chest from time to time and has occasional problems swallowing. Has chronic bronchitis, worse with season change and pollen. 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 Assessment and Plan     1 . Elevated troponin: troponin mildly elevated and flat 0.06-->>0.08-->>0.06  -EKG with no ischemic changes  -Troponin elevation of uncertain significance but symptoms are not consistent with ACS  05/07/19   ECHO ADULT COMPLETE 05/07/2019 5/7/2019    Narrative · Left Ventricle: Moderately increased wall thickness. Estimated left   ventricular ejection fraction is 61 - 65%. · Pericardium: Trivial pericardial effusion. Signed by: Babatunde Polk MD     -Recommend stress echo at some point as outpatient given underlying risk factors but not currently due to anemia, bleeding.    -Follow up with Dr. Nupur Joy in 2 weeks. 2. Anemia d/t menorrhagia: hgb stable 10.5  -Transvaginal US with Normal appearance of the uterus and ovaries  -Needs GYN evaluation     No further cardiac testing at this time. Stress echo as outpatient after anemia/menorrhagia sorted out. Follow up with Dr. Nupur Joy in 2 weeks. Cardiology attending: seen and examined. Agree with assess and plan  Still with some dizzyness and headached. No chest pain, exam unchanged.  Trop elevation does not suggest ACS but would recommend outpt stress evaluation as above.        Future Appointments   Date Time Provider Luan Mitchell   5/22/2019  1:40 PM April Mcnulty  [address]       Subjective:   Tiffany Cuna with mild sensation in chest. No SOB currently. .     Objective:      Physical Exam:                Visit Vitals  /87 (BP 1 Location: Right arm, BP Patient Position: At rest)   Pulse 65   Temp 98.2 °F (36.8 °C)   Resp 16   Wt 240 lb 6.4 oz (109 kg)   SpO2 100%   BMI 40.00 kg/m²          General Appearance:   Well developed, well nourished,alert and oriented x 3, and   individual in no acute distress. Ears/Nose/Mouth/Throat:    Hearing grossly normal.         Neck:  Supple. Chest:    Lungs clear to auscultation bilaterally. Cardiovascular:   Regular rate and rhythm, S1, S2 normal, no murmur. Abdomen:    Soft, obese   Extremities:  No edema bilaterally. Skin:  Warm and dry.      Telemetry: normal sinus rhythm          Data Review:    Labs:    Recent Results (from the past 24 hour(s))   ECHO ADULT COMPLETE    Collection Time: 05/07/19  1:29 PM   Result Value Ref Range    Tapse 1.70 1.5 - 2.0 cm    TAPSV 1.7 cm/s   CBC W/O DIFF    Collection Time: 05/08/19  4:20 AM   Result Value Ref Range    WBC 4.9 3.6 - 11.0 K/uL    RBC 3.62 (L) 3.80 - 5.20 M/uL    HGB 10.5 (L) 11.5 - 16.0 g/dL    HCT 32.5 (L) 35.0 - 47.0 %    MCV 89.8 80.0 - 99.0 FL    MCH 29.0 26.0 - 34.0 PG    MCHC 32.3 30.0 - 36.5 g/dL    RDW 13.7 11.5 - 14.5 %    PLATELET 571 273 - 510 K/uL    MPV 10.3 8.9 - 12.9 FL    NRBC 0.0 0  WBC    ABSOLUTE NRBC 0.00 0.00 - 0.01 K/uL          Radiology:        Current Facility-Administered Medications   Medication Dose Route Frequency    oxyCODONE-acetaminophen (PERCOCET) 5-325 mg per tablet 1 Tab  1 Tab Oral Q4H PRN    sodium chloride (NS) flush 5-40 mL  5-40 mL IntraVENous Q8H    sodium chloride (NS) flush 5-40 mL  5-40 mL IntraVENous PRN    acetaminophen (TYLENOL) tablet 650 mg  650 mg Oral Q4H PRN    albuterol-ipratropium (DUO-NEB) 2.5 MG-0.5 MG/3 ML  3 mL Nebulization Q4H PRN    0.9% sodium chloride infusion  75 mL/hr IntraVENous CONTINUOUS    acetaminophen (TYLENOL) tablet 650 mg  650 mg Oral Q4H PRN          Carrie Oneil.  Hanna, APRYL     [facility] [address] [place] [address], [address] [place] [address],[address]   [address]   [phone]

## 2019-05-08 NOTE — CONSULTS
Gynecology History and Physical    Name: Manoj Beth MRN: [id number] SSN: [id number]    YOB: 1985  Age: 34 y.o. Sex: female       Subjective:      Chief complaint:  Abnormal uterine bleeding    Darrion Arciniega is a 34 y.o.  female  (one set of twins) with a history of abnormal uterine bleeding. She was admitted due to feeling dizzy, weak and having a headache. She also has an elevated troponin. Seen by cardiology and outpatient follow-up recommended. She had her Nexplanon replaced in 2019. This is her 2nd one and it was time for it to be replaced. She did very well with the first Nexplanon and didn't have trouble with bleeding. She didn't have a period in March but then had a very heavy bleeding episode in April where she bled for 3 weeks. She went to her GYN Dr. Gal Velez and he prescribed OCPs. The plan was for her to take them for 2 months. However about longterm through the 1st pack, she started not feeling well - chest discomfort, nausea, so she stopped them. She has the pill with her and it was [facility] . The bleeding unfortunately returned. She's currently bleeding now and changing a pad every 1-2 hours. This bleeding is heavy but is has been heavier at various points in her life. When not on contraception she has terrible trouble with heavy periods. She's tried the IUD before and it had to be removed after 2 months because it was falling out. She is not interested in surgical management. POBHX: 6 prior uncomplicated vaginal deliveries with 1 set of twins, kids range from 15 to 2    PSURGHX: none    PMHX: obesity, hypertension    SOCHX: she smokes 1/2 PPD, works as a transporter at [employer] History    None       Past Medical History:   Diagnosis Date    CERVANTES (dyspnea on exertion) 2011    Edema of lower extremity 2011    Hypertension     Respiratory abnormalities     bronchitis     History reviewed. No pertinent surgical history.   Social History     Occupational History    Not on file   Tobacco Use    Smoking status: Current Every Day Smoker     Packs/day: 0.25    Smokeless tobacco: Never Used   Substance and Sexual Activity    Alcohol use: Yes     Comment: occasionally    Drug use: No    Sexual activity: Yes     Partners: Male     History reviewed. No pertinent family history. No Known Allergies  Prior to Admission medications    Medication Sig Start Date End Date Taking? Authorizing Provider   etonogestrel (NEXPLANON) 68 mg impl by SubDERmal route. Birth control- placed feb 2019-good for 3 years   Yes Provider, Historical   albuterol (PROVENTIL HFA, VENTOLIN HFA, PROAIR HFA) 90 mcg/actuation inhaler Take 1-2 Puffs by inhalation every four (4) hours as needed for Wheezing. 1/6/19  Yes Asha Morris, PA   chlorthalidone (HYGROTEN) 25 mg tablet Take 1 Tab by mouth daily. 12/26/17  Yes Hamzah Ross MD        Review of Systems  A comprehensive review of systems was negative except for that written in the History of Present Illness. Objective:     Vitals:    05/08/19 1404 05/08/19 1413 05/08/19 1418 05/08/19 1419   BP: (!) 132/92 113/82 (!) 139/94 130/87   Pulse: 64 61 (!) 59 68   Resp: 16 16 16 16   Temp: 98.1 °F (36.7 °C)      SpO2: 95% 100% 100% 100%   Weight:           Physical Exam:  Patient without distress. Heart: Regular rate and rhythm  Lung: clear to auscultation throughout lung fields, no wheezes, no rales, no rhonchi and normal respiratory effort  Abdomen: soft, nontender  External Genitalia: normal general appearance and pad that she has on is about 3/4 saturated    ULTRASOUND:    FINDINGS:       TRANSABDOMINAL:  The uterus measures 7.0 x 4.6 x 5.3 cm. The endometrial stripe is normal in  thickness, measuring 14 mm. The uterus appears normal.     The right ovary was not seen due to overlying bowel gas. The left ovary measures  2.9 x 2.1 x 1.6 cm. There is flow seen in the left ovary.  No evidence of masses  or cysts.     TRANSVAGINAL:  The uterus measures 6.7 x 4.7 x 5.2 cm. The endometrial stripe is normal in  thickness, measuring 13 mm. The uterus otherwise appears normal and anteverted.     The right ovary measures 3.2 x 2.1 x 2.5 cm. There is flow seen in the right  ovary. No evidence of masses or cysts. The left ovary measures 3.2 x 1.9 x 1.9 cm. There is flow seen in the left  ovary. No evidence of masses or cysts.     No free fluid in the pelvis.     IMPRESSION  IMPRESSION:    Normal appearance of the uterus and ovaries. CBC:   Lab Results   Component Value Date/Time    WBC 4.9 05/08/2019 04:20 AM    HGB 10.5 (L) 05/08/2019 04:20 AM    HCT 32.5 (L) 05/08/2019 04:20 AM     05/08/2019 04:20 AM     Assessment/Plan:     Principal Problem:    Dizziness (5/7/2019)    Active Problems:    Elevated troponin (5/7/2019)       Dysfunctional uterine bleeding with Nexplanon and resultant anemia. Normal ultrasound. NSAIDs may be of benefit. Ordered ibuprofen 400 mg PO 3 times a day. This can be continued for 5 days. I would normally resume the oral contraceptives for a short course as they were effective before but would have used a lower dose pill. However they are not available on formulary here. So instead will do estrogen 1.25 mg PO daily x 7 days. She is at a higher risk of VTE events / stroke due to HTN, smoking, and obesity. However for a short course her risk is not significantly increased. I discussed this with the patient. This can cause nausea so I also ordered promethazine 12.5 mg PO q4h PRN. That can be increased or changed as needed per primary team if dose is ineffective. These 2 things should improve her bleeding within 24 hours. If not can consider tranexamic acid. The patient is not interested in surgical management at this time.     Will follow along with primary team.

## 2019-05-08 NOTE — PROGRESS NOTES
TRANSFER - IN REPORT:    Verbal report received from eugene(name) on Byron Bennett  being received from [facility](unit) for routine progression of care      Report consisted of patients Situation, Background, Assessment and   Recommendations(SBAR). Information from the following report(s) SBAR, Kardex and MAR was reviewed with the receiving nurse. Opportunity for questions and clarification was provided. Assessment completed upon patients arrival to unit and care assumed.

## 2019-05-09 VITALS
TEMPERATURE: 98.6 F | RESPIRATION RATE: 16 BRPM | OXYGEN SATURATION: 100 % | BODY MASS INDEX: 41.2 KG/M2 | WEIGHT: 247.6 LBS | HEART RATE: 73 BPM | DIASTOLIC BLOOD PRESSURE: 91 MMHG | SYSTOLIC BLOOD PRESSURE: 136 MMHG

## 2019-05-09 PROBLEM — R77.8 ELEVATED TROPONIN: Status: RESOLVED | Noted: 2019-05-07 | Resolved: 2019-05-09

## 2019-05-09 PROBLEM — R42 DIZZINESS: Status: RESOLVED | Noted: 2019-05-07 | Resolved: 2019-05-09

## 2019-05-09 PROBLEM — N92.4 MENORRHAGIA, PREMENOPAUSAL: Status: ACTIVE | Noted: 2019-05-09

## 2019-05-09 LAB
BASOPHILS # BLD: 0 K/UL (ref 0–0.1)
BASOPHILS NFR BLD: 0 % (ref 0–1)
DIFFERENTIAL METHOD BLD: ABNORMAL
EOSINOPHIL # BLD: 0.2 K/UL (ref 0–0.4)
EOSINOPHIL NFR BLD: 3 % (ref 0–7)
ERYTHROCYTE [DISTWIDTH] IN BLOOD BY AUTOMATED COUNT: 13.7 % (ref 11.5–14.5)
HCT VFR BLD AUTO: 32.5 % (ref 35–47)
HGB BLD-MCNC: 10.5 G/DL (ref 11.5–16)
IMM GRANULOCYTES # BLD AUTO: 0 K/UL (ref 0–0.04)
IMM GRANULOCYTES NFR BLD AUTO: 0 % (ref 0–0.5)
LYMPHOCYTES # BLD: 2.9 K/UL (ref 0.8–3.5)
LYMPHOCYTES NFR BLD: 57 % (ref 12–49)
MCH RBC QN AUTO: 28.6 PG (ref 26–34)
MCHC RBC AUTO-ENTMCNC: 32.3 G/DL (ref 30–36.5)
MCV RBC AUTO: 88.6 FL (ref 80–99)
MONOCYTES # BLD: 0.3 K/UL (ref 0–1)
MONOCYTES NFR BLD: 6 % (ref 5–13)
NEUTS SEG # BLD: 1.8 K/UL (ref 1.8–8)
NEUTS SEG NFR BLD: 34 % (ref 32–75)
NRBC # BLD: 0 K/UL (ref 0–0.01)
NRBC BLD-RTO: 0 PER 100 WBC
PLATELET # BLD AUTO: 206 K/UL (ref 150–400)
PMV BLD AUTO: 10.2 FL (ref 8.9–12.9)
RBC # BLD AUTO: 3.67 M/UL (ref 3.8–5.2)
WBC # BLD AUTO: 5.1 K/UL (ref 3.6–11)

## 2019-05-09 PROCEDURE — 74011250637 HC RX REV CODE- 250/637: Performed by: NURSE PRACTITIONER

## 2019-05-09 PROCEDURE — 36415 COLL VENOUS BLD VENIPUNCTURE: CPT

## 2019-05-09 PROCEDURE — 85025 COMPLETE CBC W/AUTO DIFF WBC: CPT

## 2019-05-09 PROCEDURE — 74011250637 HC RX REV CODE- 250/637: Performed by: OBSTETRICS & GYNECOLOGY

## 2019-05-09 PROCEDURE — 99218 HC RM OBSERVATION: CPT

## 2019-05-09 RX ORDER — PROMETHAZINE HYDROCHLORIDE 12.5 MG/1
12.5 TABLET ORAL
Qty: 12 TAB | Refills: 0 | Status: SHIPPED | OUTPATIENT
Start: 2019-05-09 | End: 2019-05-12

## 2019-05-09 RX ORDER — IBUPROFEN 400 MG/1
400 TABLET ORAL
Qty: 30 TAB | Refills: 0 | Status: SHIPPED
Start: 2019-05-09 | End: 2019-05-12

## 2019-05-09 RX ORDER — TRAMADOL HYDROCHLORIDE 50 MG/1
50 TABLET ORAL
Qty: 12 TAB | Refills: 0 | Status: SHIPPED | OUTPATIENT
Start: 2019-05-09 | End: 2019-05-12

## 2019-05-09 RX ADMIN — ESTROGENS, CONJUGATED 1.25 MG: 0.62 TABLET, FILM COATED ORAL at 08:40

## 2019-05-09 RX ADMIN — OXYCODONE AND ACETAMINOPHEN 1 TABLET: 5; 325 TABLET ORAL at 05:20

## 2019-05-09 RX ADMIN — OXYCODONE AND ACETAMINOPHEN 1 TABLET: 5; 325 TABLET ORAL at 00:17

## 2019-05-09 RX ADMIN — OXYCODONE AND ACETAMINOPHEN 1 TABLET: 5; 325 TABLET ORAL at 10:35

## 2019-05-09 RX ADMIN — IBUPROFEN 400 MG: 400 TABLET ORAL at 08:40

## 2019-05-09 NOTE — PROGRESS NOTES
Patient listed as not having a primary care physician. Hospital follow-up PCP transitional care appointment has been scheduled with Marnie Burns for Friday, 5/17/19 at 12:30 p.m. Pending patient discharge.   [facility], Care Management Specialist.

## 2019-05-09 NOTE — PROGRESS NOTES
Bedside and Verbal shift change report given to [address] [place] [address],  [address], RN (oncoming nurse) by Elena Baires RN (offgoing nurse). Report included the following information SBAR, Kardex, Intake/Output, MAR and Recent Results.

## 2019-05-09 NOTE — PROGRESS NOTES
[address] May 9, 2019       RE: Naty Guido      To Whom It May Concern,    This is to certify that Naty Guido was hospitalized from 5/7/2019-5/9/2019. Please excuse from work during this time. Thank you for your assistance in this matter.       Sincerely,  Gee Leyva DO

## 2019-05-09 NOTE — PHYSICIAN ADVISORY
Letter of Status Determination: Current Status   OBSERVATION is Appropriate         Pt Name:  Apollo Aguilar   MR#  [id number]   [facility]#   [id number]   [address] [place] [address]  202/02  @ [facility]   Hospitalization date  5/7/2019  2:35 AM   Current Attending Physician  Lazarus Shropshire,    Principal diagnosis  Dizziness    Clinicals    This is a 21-year-old [facility] American woman with past medical history significant for hypertension and dyspnea on exertion, presented to [facility] Emergency Department with progressive dizziness since yesterday. She stated that she started to have dizziness while she was at work. She felt lightheadedness, worse with movement, and associated with on and off headache, 7 out of 10. She described her headache like dull headache in the middle of her head. No aggravating or relieving factor. She has also epigastric area burning sensation on and off and no aggravating or relieving factor. No fevers, chills or sweating, cough, diaphoresis, left-sided chest pain, abdominal pain, urinary complaint, or abnormal bowel movement. She has dyspnea on exertion and smoked 2 packs over 2-3 days. Occasionally, drinks alcohol. Her recent cycle lasted for 3 weeks, still on period. She changes on average 6 to 7 pads and was seen by gynecologist.  In ER, her vital signs, blood pressure was 178/88, temperature 99, pulse 83, saturation of oxygen 99% on room air. Urine pregnancy test negative. D-dimer 0.46. Transvaginal and abdominal ultrasound was done and normal appearance of the uterus and ovaries. The patient's troponin elevated and referred to hospitalist service for further evaluation and admission. Hb stable, vitals stable, continues to have heavy vaginal bleeding, orthostatics negative.         [place] MCG criteria       STATUS DETERMINATION  On the basis of clinical data, available documentaion, we believe that the current status of this patient as OBSERVATION is Appropriate     The final decision of the patient's hospitalization status depends on the attending physician's judgment    Additional comments     Insurance  Payor: Yobany Blades / Plan: [address] / Product Type: Managed Care Medicaid /    Insurance Information                [address] [place] [address] [place] [address] Phone:     Subscriber: Howard Soler Subscriber#: [id number]    Group#:  Precert#:                    Collier Aschoff MD  Cell: [phone]  Physician Advisor

## 2019-05-09 NOTE — PROGRESS NOTES
Problem: Falls - Risk of  Goal: *Absence of Falls  Description  Document Jose De Jesus Roper Fall Risk and appropriate interventions in the flowsheet.   Outcome: Progressing Towards Goal

## 2019-05-09 NOTE — DISCHARGE SUMMARY
Discharge Summary       PATIENT ID: Dominguez Tucker  MRN: [id number]   YOB: 1985    DATE OF ADMISSION: 2019  2:35 AM    DATE OF DISCHARGE: 2019  PRIMARY CARE PROVIDER: None     ATTENDING PHYSICIAN: Dr. Trudy Martin  DISCHARGING PROVIDER: Loraine Stapleton NP    To contact this individual call [phone] and ask the  to page. If unavailable ask to be transferred the Adult Hospitalist Department. CONSULTATIONS: IP CONSULT TO CARDIOLOGY  IP CONSULT TO OB GYN    PROCEDURES/SURGERIES: * No surgery found *    ADMITTING DIAGNOSES & HOSPITAL COURSE:   Dizziness  Chest Pain  Menorrhagia  Pt is a 33 y.o.  female  (one set of twins) with a history of abnormal uterine bleeding last month and she was put on OCPs. She was supposed to take them for 2 months but stopped taking them half way through the first pack as she started not feeling well having chest discomfort and nausea. She reported to ED w/ dizziness, weakness and HA and was found to have an elevated troponin and was admitted for further workup. She was seen by Cardiology and outpatient f/u recommended. Seen by gynecology and started on estrogen. DISCHARGE DIAGNOSES / PLAN:      Dizziness, unclear etiology   -Resolved    Chest Pain  -Resolved  -No left-sided chest pain, no EKG change  -Denies CERVANTES   -Trops: 0.06-0.08-0.06  -Echo  shows moderately increased L wall thickness w/ EF 61-65% and trivial pericardial effusion  -Seen by cardiology-f/u in 2 weeks for stress echo     Anemia secondary to menorrhagia  -Ultrasound of uterus, normal appearance of uterus and ovaries.    -Iron profile unremarkable  -H&H stable at 10.5  -Seen by GYN , started on Estrogen x7 days.  First dose yesterday  -Follow up w/ Pt's GYN Dr. Donnell Vega in 1 week to evaluate menorrhagia and after finishing course of estrogen     Hypokalemia-resolved     Hyperglycemia  -No history of diabetes mellitus type 2  -A1c 6.3-discussed with patient lifestyle and diet changes  -Instructed patient to establish with a PCP to follow     Hypertension  -Stable  -Orthostatics negative  -Establish with PCP     Left midfoot swelling for 3 to 4 months  -No history of trauma.    -Xray 5/7 negative  -Tylenol for pain prn  -Establish with PCP     Tobacco  -The patient is counseled and advised to stop smoking, declines nicotine patches at this time     -Discussed with patient that it is imperative she establish with a PCP to follow her borderline hypertension and pre-diabetes. Pt verbalized understanding.-    Code status: Full Code    Patient to discharge home today and f/u as instructed     ADDITIONAL CARE RECOMMENDATIONS: NA    PENDING TEST RESULTS:   At the time of discharge the following test results are still pending: NA    FOLLOW UP APPOINTMENTS:    Follow-up Information     Follow up With Specialties Details Why Contact Info    Anthony Chicas MD Cardiology On 5/22/2019 1:40 pm [email or web address]  [address]  [phone]      None    None (395) Patient stated that they have no PCP      Colby Alaniz MD Obstetrics & Gynecology, Gynecology, Obstetrics Call in 1 week Follow up on heavy bleeding and after completion of estrogen pills [place] [address] [address]  [phone]               DIET: Cardiac, carb consistent    ACTIVITY: As tolerated    WOUND CARE: NA    EQUIPMENT needed: NA      DISCHARGE MEDICATIONS:  Current Discharge Medication List      START taking these medications    Details   conjugated estrogens (PREMARIN) 1.25 mg tablet Take 1 Tab by mouth daily for 6 days. Qty: 6 Tab, Refills: 0      ibuprofen (MOTRIN) 400 mg tablet Take 1 Tab by mouth every eight (8) hours as needed for Pain (Take with food, can use over the counter) for up to 3 days. Qty: 30 Tab, Refills: 0      promethazine (PHENERGAN) 12.5 mg tablet Take 1 Tab by mouth every six (6) hours as needed for Nausea for up to 3 days.   Qty: 12 Tab, Refills: 0 traMADol (ULTRAM) 50 mg tablet Take 1 Tab by mouth every six (6) hours as needed for Pain for up to 3 days. Max Daily Amount: 200 mg. Indications: Pain  Qty: 12 Tab, Refills: 0    Associated Diagnoses: Vaginal bleeding; Menorrhagia, premenopausal         CONTINUE these medications which have NOT CHANGED    Details   etonogestrel (NEXPLANON) 68 mg impl by SubDERmal route. Birth control- placed feb 2019-good for 3 years      albuterol (PROVENTIL HFA, VENTOLIN HFA, PROAIR HFA) 90 mcg/actuation inhaler Take 1-2 Puffs by inhalation every four (4) hours as needed for Wheezing. Qty: 1 Inhaler, Refills: 1      chlorthalidone (HYGROTEN) 25 mg tablet Take 1 Tab by mouth daily. Qty: 60 Tab, Refills: 3               NOTIFY YOUR PHYSICIAN FOR ANY OF THE FOLLOWING:   Fever over 101 degrees for 24 hours. Chest pain, shortness of breath, fever, chills, nausea, vomiting, diarrhea, change in mentation, falling, weakness, bleeding. Severe pain or pain not relieved by medications. Or, any other signs or symptoms that you may have questions about.     DISPOSITION:  X  Home With:   OT  PT  HH  RN       Long term SNF/Inpatient Rehab    Independent/assisted living    Hospice    Other:       PATIENT CONDITION AT DISCHARGE:     Functional status    Poor     Deconditioned    X Independent      Cognition   X  Lucid     Forgetful     Dementia      Catheters/lines (plus indication)    [place]     PICC     PEG    X None      Code status   X  Full code     DNR      PHYSICAL EXAMINATION AT DISCHARGE:  BP (!) 136/91 (BP 1 Location: Right arm, BP Patient Position: At rest)   Pulse 73   Temp 98.6 °F (37 °C)   Resp 16   Wt 112.3 kg (247 lb 9.6 oz)   SpO2 100%   BMI 41.20 kg/m²      Review of Systems:              Symptom  Y/N  Comments    Symptom  Y/N  Comments    Fever/Chills  N      Chest Pain  N     Poor Appetite  N      Edema  Y  Left foot-chronic    Cough  N     Abdominal Pain   Y Menstrual cramps    Sputum  N     Joint Pain  N   SOB/CERVANTES  N     Pruritis/Rash  N      Nausea/vomit  N     Tolerating PT/OT         Diarrhea  N     Tolerating Diet  Y      Constipation  N     Other         Could not obtain due to:                 General:  Alert, cooperative, no distress, appears stated age. Lungs:   Clear to auscultation bilaterally. Heart:  Regular rate and rhythm, S1, S2 normal, no murmur, click, rub or gallop. Abdomen:   Soft, non-tender. Bowel sounds normal   Extremities: Extremities normal, atraumatic, no cyanosis, +1 edema Lt foot-chronic   Pulses: 2+ and symmetric all extremities. Skin: Skin color, texture, turgor normal. No rashes or lesions   Neurologic: CNII-XII intact.         CHRONIC MEDICAL DIAGNOSES:  Problem List as of 5/9/2019 Date Reviewed: 5/7/2019          Codes Class Noted - Resolved    * (Principal) Menorrhagia, premenopausal ICD-10-CM: N92.4  ICD-9-CM: 627.0  5/9/2019 - Present        RESOLVED: Dizziness ICD-10-CM: B44  ICD-9-CM: 780.4  5/7/2019 - 5/9/2019        RESOLVED: Elevated troponin ICD-10-CM: R74.8  ICD-9-CM: 790.6  5/7/2019 - 5/9/2019        RESOLVED: Edema of lower extremity ICD-10-CM: R60.0  ICD-9-CM: 782.3  6/29/2011 - 5/9/2019        RESOLVED: CERVANTES (dyspnea on exertion) ICD-10-CM: R06.09  ICD-9-CM: 786.09  6/29/2011 - 5/9/2019              Greater than 35 minutes were spent with the patient on counseling and coordination of care    Signed:   Alison Woody NP  5/9/2019  10:08 AM

## 2019-05-09 NOTE — DISCHARGE INSTRUCTIONS
Discharge Instructions       PATIENT ID: Katina Eng  MRN: [id number]   YOB: 1985    DATE OF ADMISSION: 5/7/2019  2:35 AM    DATE OF DISCHARGE: 5/9/2019    PRIMARY CARE PROVIDER: None     ATTENDING PHYSICIAN: Bola Tellez DO  DISCHARGING PROVIDER: Irving Romero NP    To contact this individual call [phone] and ask the  to page. If unavailable ask to be transferred the Adult Hospitalist Department. DISCHARGE DIAGNOSES ***    CONSULTATIONS: IP CONSULT TO CARDIOLOGY  IP CONSULT TO OB GYN    PROCEDURES/SURGERIES: * No surgery found *    PENDING TEST RESULTS:   At the time of discharge the following test results are still pending: NA    FOLLOW UP APPOINTMENTS:     ***It is imperative that you establish with a primary care physician to monitor your borderline high blood pressure and pre-diabetes. ***    Follow-up Information     Follow up With Specialties Details Why Contact Info    Jerad Garcia MD Cardiology On 5/22/2019 1:40 pm [place] [address]  [address] [facility] [phone]      None    None (790) Patient stated that they have no PCP      Demetrio Kaur MD Obstetrics & Gynecology, Gynecology, Obstetrics Call in 1 week Follow up on heavy bleeding and after completion of estrogen pills [address]  [phone]             ADDITIONAL CARE RECOMMENDATIONS: NA    DIET: Cardiac, carb consistent    ACTIVITY: As tolerated    WOUND CARE: NA    EQUIPMENT needed: NA      DISCHARGE MEDICATIONS:   See Medication Reconciliation Form    · It is important that you take the medication exactly as they are prescribed. · Keep your medication in the bottles provided by the pharmacist and keep a list of the medication names, dosages, and times to be taken in your wallet. · Do not take other medications without consulting your doctor. NOTIFY YOUR PHYSICIAN FOR ANY OF THE FOLLOWING:   Fever over 101 degrees for 24 hours.    Chest pain, shortness of breath, fever, chills, nausea, vomiting, diarrhea, change in mentation, falling, weakness, bleeding. Severe pain or pain not relieved by medications. Or, any other signs or symptoms that you may have questions about. DISPOSITION:  X  Home With:   OT  PT  HH  RN       SNF/Inpatient Rehab/LTAC    Independent/assisted living    Hospice    Other:     CDMP Checked: Yes X     PROBLEM LIST Updated:   Yes X       Signed:   Wolf Hagan NP  5/9/2019  10:18 AM

## 2019-05-10 NOTE — PROGRESS NOTES
Charted 5/10/19:    Spiritual Care Partner Volunteer visited patient in Rm 202 on 5/9/19. Documented by:   Chaplain Fontenot MDiv, MACE  [phone] ([phone])

## 2019-05-17 ENCOUNTER — OFFICE VISIT (OUTPATIENT)
Dept: INTERNAL MEDICINE CLINIC | Age: 34
End: 2019-05-17

## 2019-05-17 VITALS
OXYGEN SATURATION: 98 % | HEIGHT: 65 IN | HEART RATE: 77 BPM | TEMPERATURE: 97.9 F | BODY MASS INDEX: 40.48 KG/M2 | DIASTOLIC BLOOD PRESSURE: 87 MMHG | RESPIRATION RATE: 16 BRPM | SYSTOLIC BLOOD PRESSURE: 128 MMHG | WEIGHT: 243 LBS

## 2019-05-17 DIAGNOSIS — E04.1 THYROID NODULE: ICD-10-CM

## 2019-05-17 DIAGNOSIS — E66.01 OBESITY, MORBID (HCC): ICD-10-CM

## 2019-05-17 DIAGNOSIS — R06.09 DOE (DYSPNEA ON EXERTION): ICD-10-CM

## 2019-05-17 DIAGNOSIS — R60.0 EDEMA OF LOWER EXTREMITY: Primary | ICD-10-CM

## 2019-05-17 PROBLEM — R73.02 IGT (IMPAIRED GLUCOSE TOLERANCE): Status: ACTIVE | Noted: 2019-05-07

## 2019-05-17 PROBLEM — Z00.00 PREVENTATIVE HEALTH CARE: Status: ACTIVE | Noted: 2019-05-17

## 2019-05-17 RX ORDER — PANTOPRAZOLE SODIUM 40 MG/1
40 TABLET, DELAYED RELEASE ORAL DAILY
Qty: 30 TAB | Refills: 3 | Status: SHIPPED | OUTPATIENT
Start: 2019-05-17 | End: 2021-06-02 | Stop reason: ALTCHOICE

## 2019-05-17 RX ORDER — CHLORTHALIDONE 25 MG/1
25 TABLET ORAL DAILY
Qty: 60 TAB | Refills: 6 | Status: SHIPPED | OUTPATIENT
Start: 2019-05-17 | End: 2020-05-30

## 2019-05-17 RX ORDER — POTASSIUM CHLORIDE 20 MEQ/1
20 TABLET, EXTENDED RELEASE ORAL DAILY
Qty: 30 TAB | Refills: 5 | Status: SHIPPED | OUTPATIENT
Start: 2019-05-17 | End: 2021-06-02 | Stop reason: SDUPTHER

## 2019-05-17 NOTE — PROGRESS NOTES
SPORTS MEDICINE AND PRIMARY CARE  Osei Mcrae MD, [address] [address]  Phone:  [phone]  Fax: [phone]    Chief Complaint   Patient presents with   [facility] Follow Up       SUBJECTIVE:    Emmie Soto is a 34 y.o. female Patient comes in today and is seen as a new patient for evaluation and ongoing care. Review of [facility] notes indicated she was admitted on 05/07 and discharged on 05/08 by Dr. Ramonita Gonzalez and during that admission had cardiology and GYN consults. She was found to have abnormal uterine bleeding and was put on OCPs. She stopped taking them longterm through the first pack as she was not feeling well and had chest discomfort and nausea. She presented to the ED with dizziness, weakness and headache and was found to have elevated troponin level and was admitted for further evaluation and care. Dizziness resolved, chest pain resolved. The echo showed moderate increase in left ventricular wall thickness but with EF of 61-65%, trivial pericardial effusion. She is to see cardiology in follow up in two weeks for stress echo. The anemia was secondary to  menorrhagia with a normal ultrasound. She had hypokalemia, which also resolved during hospital stay with correction. She had hyperglycemia. No history of diabetes. Hemoglobin A1c was 6.3. Hypertension was stable with orthostatics. She had left foot swelling for three to four months and given Tylenol 3 for pain. X-ray was negative. She was advised to stop cigarettes. Patient comes in for evaluation. Current Outpatient Medications   Medication Sig Dispense Refill    pantoprazole (PROTONIX) 40 mg tablet Take 1 Tab by mouth daily. 30 Tab 3    potassium chloride (K-DUR, KLOR-CON) 20 mEq tablet Take 1 Tab by mouth daily. 30 Tab 5    chlorthalidone (HYGROTEN) 25 mg tablet Take 1 Tab by mouth daily. 60 Tab 6    etonogestrel (NEXPLANON) 68 mg impl by SubDERmal route.  Birth control- placed feb 2019-good for 3 years       Past Medical History:   Diagnosis Date    Cigarette smoker     CERVANTES (dyspnea on exertion) 06/29/2011    Edema of lower extremity 06/29/2011    Hypertension     IGT (impaired glucose tolerance) 05/07/2019    Morbid obesity with BMI of 40.0-44.9, adult (HCC)     Respiratory abnormalities     bronchitis     History reviewed. No pertinent surgical history. No Known Allergies    REVIEW OF SYSTEMS:  General: negative for - chills or fever  ENT: negative for - headaches, nasal congestion or tinnitus  Respiratory: negative for - cough, hemoptysis, shortness of breath or wheezing  Cardiovascular : negative for - chest pain, edema, palpitations or shortness of breath  Gastrointestinal: negative for - abdominal pain, blood in stools, heartburn or nausea/vomiting  Genito-Urinary: no dysuria, trouble voiding, or hematuria  Musculoskeletal: negative for - gait disturbance, joint pain, joint stiffness or joint swelling  Neurological: no TIA or stroke symptoms  Hematologic: no bruises, no bleeding, no swollen glands  Integument: no lumps, mole changes, nail changes or rash  Endocrine:no malaise/lethargy or unexpected weight changes      Social History     Socioeconomic History    Marital status: SINGLE     Spouse name: Not on file    Number of children: Not on file    Years of education: Not on file    Highest education level: Not on file   Tobacco Use    Smoking status: Current Every Day Smoker     Packs/day: 0.25    Smokeless tobacco: Never Used   Substance and Sexual Activity    Alcohol use: Yes     Comment: occasionally    Drug use: No    Sexual activity: Yes     Partners: Male   Social History Narrative    ** Merged History Encounter **          History reviewed. No pertinent family history. Habits:  Smokes now down to a cigarette a day, she is trying to stop. She is a social drinker. She occasionally uses marijuana.     Social History:  The patient is single, her mom lives with her.  She has seven children ages 3-16, two sons and five daughters. 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 She has no Confucianist background. Family History:  Father is unknown. Mother is 54 with RA. One brother, one sister, alive and well. OBJECTIVE:     Visit Vitals  /87   Pulse 77   Temp 97.9 °F (36.6 °C) (Oral)   Resp 16   Ht 5' 5\" (1.651 m)   Wt 243 lb (110.2 kg)   SpO2 98%   BMI 40.44 kg/m²     CONSTITUTIONAL: well , well nourished, appears age appropriate  EYES: perrla, eom intact  ENMT:moist mucous membranes, pharynx clear  NECK: supple. Thyroid normal  RESPIRATORY: Chest: clear bilaterally  CARDIOVASCULAR: Heart: regular rate and rhythm  GASTROINTESTINAL: Abdomen: soft, bowel sounds active  HEMATOLOGIC: no pathological lymph nodes palpated  MUSCULOSKELETAL: Extremities: no edema, pulse 1+   INTEGUMENT: No unusual rashes or suspicious skin lesions noted. Nails appear normal.  NEUROLOGIC: non-focal exam   MENTAL STATUS: alert and oriented, appropriate affect     Admission on 05/07/2019, Discharged on 05/09/2019   Component Date Value Ref Range Status    SAMPLES BEING HELD 05/07/2019 1RED 1BLUE   Final    COMMENT 05/07/2019 Add-on orders for these samples will be processed based on acceptable specimen integrity and analyte stability, which may vary by analyte.     Final    WBC 05/07/2019 6.2  3.6 - 11.0 K/uL Final    RBC 05/07/2019 3.82  3.80 - 5.20 M/uL Final    HGB 05/07/2019 10.9* 11.5 - 16.0 g/dL Final    HCT 05/07/2019 33.6* 35.0 - 47.0 % Final    MCV 05/07/2019 88.0  80.0 - 99.0 FL Final    MCH 05/07/2019 28.5  26.0 - 34.0 PG Final    MCHC 05/07/2019 32.4  30.0 - 36.5 g/dL Final    RDW 05/07/2019 14.0  11.5 - 14.5 % Final    PLATELET 72/14/1031 735  150 - 400 K/uL Final    MPV 05/07/2019 10.3  8.9 - 12.9 FL Final    NRBC 05/07/2019 0.0  0  WBC Final    ABSOLUTE NRBC 05/07/2019 0.00  0.00 - 0.01 K/uL Final    NEUTROPHILS 05/07/2019 51  32 - 75 % Final    LYMPHOCYTES 05/07/2019 41  12 - 49 % Final    MONOCYTES 05/07/2019 5  5 - 13 % Final    EOSINOPHILS 05/07/2019 3  0 - 7 % Final    BASOPHILS 05/07/2019 0  0 - 1 % Final    IMMATURE GRANULOCYTES 05/07/2019 0  0.0 - 0.5 % Final    ABS. NEUTROPHILS 05/07/2019 3.1  1.8 - 8.0 K/UL Final    ABS. LYMPHOCYTES 05/07/2019 2.6  0.8 - 3.5 K/UL Final    ABS. MONOCYTES 05/07/2019 0.3  0.0 - 1.0 K/UL Final    ABS. EOSINOPHILS 05/07/2019 0.2  0.0 - 0.4 K/UL Final    ABS. BASOPHILS 05/07/2019 0.0  0.0 - 0.1 K/UL Final    ABS. IMM. GRANS. 05/07/2019 0.0  0.00 - 0.04 K/UL Final    DF 05/07/2019 AUTOMATED    Final    Sodium 05/07/2019 140  136 - 145 mmol/L Final    Potassium 05/07/2019 2.8* 3.5 - 5.1 mmol/L Final    Chloride 05/07/2019 105  97 - 108 mmol/L Final    CO2 05/07/2019 29  21 - 32 mmol/L Final    Anion gap 05/07/2019 6  5 - 15 mmol/L Final    Glucose 05/07/2019 141* 65 - 100 mg/dL Final    BUN 05/07/2019 13  6 - 20 MG/DL Final    Creatinine 05/07/2019 1.02  0.55 - 1.02 MG/DL Final    BUN/Creatinine ratio 05/07/2019 13  12 - 20   Final    GFR est AA 05/07/2019 >60  >60 ml/min/1.73m2 Final    GFR est non-AA 05/07/2019 >60  >60 ml/min/1.73m2 Final    Comment: Estimated GFR is calculated using the IDMS-traceable Modification of Diet in Renal Disease (MDRD) Study equation, reported for both  Americans (GFRAA) and non- Americans (GFRNA), and normalized to 1.73m2 body surface area. The physician must decide which value applies to the patient. The MDRD study equation should only be used in individuals age 25 or older. It has not been validated for the following: pregnant women, patients with serious comorbid conditions, or on certain medications, or persons with extremes of body size, muscle mass, or nutritional status.       Calcium 05/07/2019 9.3  8.5 - 10.1 MG/DL Final    Bilirubin, total 05/07/2019 0.2  0.2 - 1.0 MG/DL Final    ALT (SGPT) 05/07/2019 54  12 - 78 U/L Final    AST (SGOT) 05/07/2019 20  15 - 37 U/L Final    Alk. phosphatase 05/07/2019 65  45 - 117 U/L Final    Protein, total 05/07/2019 7.3  6.4 - 8.2 g/dL Final    Albumin 05/07/2019 3.3* 3.5 - 5.0 g/dL Final    Globulin 05/07/2019 4.0  2.0 - 4.0 g/dL Final    A-G Ratio 05/07/2019 0.8* 1.1 - 2.2   Final    Color 05/07/2019 RED    Final    Color Reference Range: Straw, Yellow or Dark Yellow    Appearance 05/07/2019 TURBID* CLEAR   Final    Specific gravity 05/07/2019 1.028  1.003 - 1.030   Final    pH (UA) 05/07/2019 7.0  5.0 - 8.0   Final    Protein 05/07/2019 30* NEG mg/dL Final    Glucose 05/07/2019 NEGATIVE   NEG mg/dL Final    Ketone 05/07/2019 TRACE* NEG mg/dL Final    Bilirubin 05/07/2019 NEGATIVE   NEG   Final    Blood 05/07/2019 LARGE* NEG   Final    Urobilinogen 05/07/2019 1.0  0.2 - 1.0 EU/dL Final    Nitrites 05/07/2019 NEGATIVE   NEG   Final    Leukocyte Esterase 05/07/2019 SMALL* NEG   Final    WBC 05/07/2019 0-4  0 - 4 /hpf Final    RBC 05/07/2019 >100* 0 - 5 /hpf Final    Epithelial cells 05/07/2019 FEW  FEW /lpf Final    Epithelial cell category consists of squamous cells and /or transitional urothelial cells. Renal tubular cells, if present, are separately identified as such.  Bacteria 05/07/2019 NEGATIVE   NEG /hpf Final    Urine culture hold 05/07/2019 URINE ON HOLD IN MICROBIOLOGY DEPT FOR 3 DAYS. IF UNPRESERVED URINE IS SUBMITTED, IT CANNOT BE USED FOR ADDITIONAL TESTING AFTER 24 HRS, RECOLLECTION WILL BE REQUIRED.     Final    Ventricular Rate 05/07/2019 67  BPM Final    Atrial Rate 05/07/2019 67  BPM Final    P-R Interval 05/07/2019 222  ms Final    QRS Duration 05/07/2019 88  ms Final    Q-T Interval 05/07/2019 408  ms Final    QTC Calculation (Bezet) 05/07/2019 431  ms Final    Calculated P Axis 05/07/2019 54  degrees Final    Calculated R Axis 05/07/2019 80  degrees Final    Calculated T Axis 05/07/2019 41  degrees Final    Diagnosis 05/07/2019 Final                    Value:Sinus rhythm with sinus arrhythmia with 1st degree AV block  When compared with ECG of 29-JUN-2017 13:04,  No significant change was found  Confirmed by Emily Foreman MD, Kimmie Mccormick ([id number]) on 5/7/2019 5:47:59 PM      Troponin-I, Qt. 05/07/2019 0.06* <0.05 ng/mL Final    Comment: The presence of detectable troponin above the reference range indicates myocardial injury which may be due to ischemia, myocarditis, trauma, etc.  Clinical correlation is necessary to establish the significance of this finding. Sequential testing is recommended to determine if the typical rise and fall of cTnI is demonstrated. Note:  Cardiac troponin I has a relatively long half life and may be present well after the CK MB has returned to baseline. The reference range is based on the 99th percentile of the referent population.  Pregnancy test,urine (POC) 05/07/2019 NEGATIVE   NEG   Final    Troponin-I, Qt. 05/07/2019 0.08* <0.05 ng/mL Final    D-dimer 05/07/2019 0.46  0.00 - 0.65 mg/L FEU Final    Comment: (NOTE)  The combination of a low pre-test probability based on Wells criteria  and a D-Dimer result below the cutoff value of 0.5 mg/L increases the   negative predictive value for DVT to %.  TSH 05/07/2019 0.58  0.36 - 3.74 uIU/mL Final    Comment: (NOTE)  Due to TSH heterogeneity, both structurally and degree of   glycosylation, monoclonal antibodies used in the TSH assay may not   accurately quantitate TSH.  Therefore, this result should be   correlated with clinical findings as well as with other assessments   of thyroid function, e.g., free T4, free T3.      Troponin-I, Qt. 05/07/2019 0.06* <0.05 ng/mL Final    Tapse 05/07/2019 1.70  1.5 - 2.0 cm Final    TAPSV 05/07/2019 1.7  cm/s Final    Hemoglobin A1c 05/07/2019 6.3  4.2 - 6.3 % Final    Est. average glucose 05/07/2019 134  mg/dL Final    Comment: (NOTE)  The eAG should be interpreted with patient characteristics in mind   since ethnicity, interindividual differences, red cell lifespan,   variation in rates of glycation, etc. may affect the validity of the   calculation.  Iron 05/07/2019 53  35 - 150 ug/dL Final    TIBC 05/07/2019 332  250 - 450 ug/dL Final    Iron % saturation 05/07/2019 16* 20 - 50 % Final    LIPID PROFILE 05/07/2019        Final    Cholesterol, total 05/07/2019 142  <200 MG/DL Final    Triglyceride 05/07/2019 84  <150 MG/DL Final    Based on NCEP-ATP III:  Triglycerides <150 mg/dL  is considered normal, 150-199 mg/dL  borderline high,  200-499 mg/dL high and  greater than or equal to 500 mg/dL very high.  HDL Cholesterol 05/07/2019 45  MG/DL Final    Based on NCEP ATP III, HDL Cholesterol <40 mg/dL is considered low and >60 mg/dL is elevated.     LDL, calculated 05/07/2019 80.2  0 - 100 MG/DL Final    Comment: Based on the NCEP-ATP: LDL-C concentrations are considered  optimal <100 mg/dL,  near optimal/above Normal 100-129 mg/dL  Borderline High: 130-159, High: 160-189 mg/dL  Very High: Greater than or equal to 190 mg/dL      VLDL, calculated 05/07/2019 16.8  MG/DL Final    CHOL/HDL Ratio 05/07/2019 3.2  0.0 - 5.0   Final    Magnesium 05/07/2019 2.0  1.6 - 2.4 mg/dL Final    Sodium 05/07/2019 142  136 - 145 mmol/L Final    Potassium 05/07/2019 4.5  3.5 - 5.1 mmol/L Final    INVESTIGATED PER DELTA CHECK PROTOCOL    Chloride 05/07/2019 107  97 - 108 mmol/L Final    CO2 05/07/2019 27  21 - 32 mmol/L Final    Anion gap 05/07/2019 8  5 - 15 mmol/L Final    Glucose 05/07/2019 125* 65 - 100 mg/dL Final    BUN 05/07/2019 16  6 - 20 MG/DL Final    Creatinine 05/07/2019 0.78  0.55 - 1.02 MG/DL Final    BUN/Creatinine ratio 05/07/2019 21* 12 - 20   Final    GFR est AA 05/07/2019 >60  >60 ml/min/1.73m2 Final    GFR est non-AA 05/07/2019 >60  >60 ml/min/1.73m2 Final    Calcium 05/07/2019 9.1  8.5 - 10.1 MG/DL Final    Bilirubin, total 05/07/2019 0.2  0.2 - 1.0 MG/DL Final    ALT (SGPT) 05/07/2019 46  12 - 78 U/L Final    AST (SGOT) 05/07/2019 18  15 - 37 U/L Final    Alk. phosphatase 05/07/2019 60  45 - 117 U/L Final    Protein, total 05/07/2019 6.8  6.4 - 8.2 g/dL Final    Albumin 05/07/2019 3.2* 3.5 - 5.0 g/dL Final    Globulin 05/07/2019 3.6  2.0 - 4.0 g/dL Final    A-G Ratio 05/07/2019 0.9* 1.1 - 2.2   Final    WBC 05/08/2019 4.9  3.6 - 11.0 K/uL Final    RBC 05/08/2019 3.62* 3.80 - 5.20 M/uL Final    HGB 05/08/2019 10.5* 11.5 - 16.0 g/dL Final    HCT 05/08/2019 32.5* 35.0 - 47.0 % Final    MCV 05/08/2019 89.8  80.0 - 99.0 FL Final    MCH 05/08/2019 29.0  26.0 - 34.0 PG Final    MCHC 05/08/2019 32.3  30.0 - 36.5 g/dL Final    RDW 05/08/2019 13.7  11.5 - 14.5 % Final    PLATELET 01/14/5744 227  150 - 400 K/uL Final    MPV 05/08/2019 10.3  8.9 - 12.9 FL Final    NRBC 05/08/2019 0.0  0  WBC Final    ABSOLUTE NRBC 05/08/2019 0.00  0.00 - 0.01 K/uL Final    WBC 05/09/2019 5.1  3.6 - 11.0 K/uL Final    RBC 05/09/2019 3.67* 3.80 - 5.20 M/uL Final    HGB 05/09/2019 10.5* 11.5 - 16.0 g/dL Final    HCT 05/09/2019 32.5* 35.0 - 47.0 % Final    MCV 05/09/2019 88.6  80.0 - 99.0 FL Final    MCH 05/09/2019 28.6  26.0 - 34.0 PG Final    MCHC 05/09/2019 32.3  30.0 - 36.5 g/dL Final    RDW 05/09/2019 13.7  11.5 - 14.5 % Final    PLATELET 93/49/1877 776  150 - 400 K/uL Final    MPV 05/09/2019 10.2  8.9 - 12.9 FL Final    NRBC 05/09/2019 0.0  0  WBC Final    ABSOLUTE NRBC 05/09/2019 0.00  0.00 - 0.01 K/uL Final    NEUTROPHILS 05/09/2019 34  32 - 75 % Final    LYMPHOCYTES 05/09/2019 57* 12 - 49 % Final    MONOCYTES 05/09/2019 6  5 - 13 % Final    EOSINOPHILS 05/09/2019 3  0 - 7 % Final    BASOPHILS 05/09/2019 0  0 - 1 % Final    IMMATURE GRANULOCYTES 05/09/2019 0  0.0 - 0.5 % Final    ABS. NEUTROPHILS 05/09/2019 1.8  1.8 - 8.0 K/UL Final    ABS. LYMPHOCYTES 05/09/2019 2.9  0.8 - 3.5 K/UL Final    ABS. MONOCYTES 05/09/2019 0.3  0.0 - 1.0 K/UL Final    ABS.  EOSINOPHILS 05/09/2019 0.2  0.0 - 0.4 K/UL Final    ABS. BASOPHILS 05/09/2019 0.0  0.0 - 0.1 K/UL Final    ABS. IMM. GRANS. 05/09/2019 0.0  0.00 - 0.04 K/UL Final    DF 05/09/2019 AUTOMATED    Final       ASSESSMENT:   1. Edema of lower extremity    2. Obesity, morbid ([address]    3. CERVANTES (dyspnea on exertion)    4. Thyroid nodule      Examination is remarkable for a right thyroid nodule. Will ask for an ultrasound. She had a TSH that was normal during the hospital stay. Regarding the chest pain, we encouraged her to keep her follow up appointment with cardiology, which is on the 22nd of this month. O2 sat is 98%, which is acceptable. BMI is elevated. We encouraged a heart healthy, weight reducing diet with physical activity 30 minutes five days a week. We advise her of her new diagnosis of IGT. She will be back to see us in about four months, sooner if she has any problems. I have discussed the diagnosis with the patient and the intended plan as seen in the  orders above. The patient understands and agees with the plan. The patient has   received an after visit summary and questions were answered concerning  future plans  Patient labs and/or xrays were reviewed  Past records were reviewed. PLAN:  .  Orders Placed This Encounter    US THYROID/PARATHYROID/SOFT TISS    pantoprazole (PROTONIX) 40 mg tablet    potassium chloride (K-DUR, KLOR-CON) 20 mEq tablet    chlorthalidone (HYGROTEN) 25 mg tablet           ATTENTION:   This medical record was transcribed using an electronic medical records system. Although proofread, it may and can contain electronic and spelling errors. Other human spelling and other errors may be present. Corrections may be executed at a later time. Please feel free to contact us for any clarifications as needed.

## 2019-05-17 NOTE — PROGRESS NOTES
Chief Complaint   Patient presents with   [facility] Follow Up     1. Have you been to the ER, urgent care clinic since your last visit? Hospitalized since your last visit? Yes When: 05- Where: [facility] Reason for visit: Shortness of breath    2. Have you seen or consulted any other health care providers outside of the [address] [place] [address] since your last visit? Include any pap smears or colon screening.  No

## 2019-05-22 ENCOUNTER — OFFICE VISIT (OUTPATIENT)
Dept: CARDIOLOGY CLINIC | Age: 34
End: 2019-05-22

## 2019-05-22 VITALS
DIASTOLIC BLOOD PRESSURE: 86 MMHG | RESPIRATION RATE: 16 BRPM | HEART RATE: 79 BPM | HEIGHT: 65 IN | WEIGHT: 245.6 LBS | OXYGEN SATURATION: 99 % | BODY MASS INDEX: 40.92 KG/M2 | SYSTOLIC BLOOD PRESSURE: 132 MMHG

## 2019-05-22 DIAGNOSIS — R77.8 ELEVATED TROPONIN: ICD-10-CM

## 2019-05-22 DIAGNOSIS — R94.31 ABNORMAL EKG: Primary | ICD-10-CM

## 2019-05-22 DIAGNOSIS — I20.0 UNSTABLE ANGINA PECTORIS (HCC): ICD-10-CM

## 2019-05-22 NOTE — PROGRESS NOTES
Cardiovascular Office Visit    Patient: Kuldip Massey MRN: [id number]  SSN: [id number]    YOB: 1985  Age: 34 y.o. Sex: female       Subjective:        Primary Care Provider: None     Chief Complaint   Patient presents with   [facility] Follow Up     dizziness         Kuldip Massey is a 34 y.o.  female who presents for follow up evaluation of chest pain and elevated troponin. The patient was admitted to [facility] 5/7 to 5/8/2019 for dizziness, menorrhagia, and chest pain. Today she reports starting to feel better. Less chest discomfort. Trying to stop smoking. 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 Menorrhagia is improving. Past Medical History:   Diagnosis Date    Cigarette smoker     CERVANTES (dyspnea on exertion) 06/29/2011    Edema of lower extremity 06/29/2011    Hypertension     IGT (impaired glucose tolerance) 05/07/2019    Morbid obesity with BMI of 40.0-44.9, adult (HCC)     Respiratory abnormalities     bronchitis      History reviewed. No pertinent surgical history. History reviewed. No pertinent family history. Social History     Tobacco Use    Smoking status: Current Every Day Smoker     Packs/day: 0.25    Smokeless tobacco: Never Used   Substance Use Topics    Alcohol use: Yes     Comment: occasionally      Current Outpatient Medications   Medication Sig    pantoprazole (PROTONIX) 40 mg tablet Take 1 Tab by mouth daily.  potassium chloride (K-DUR, KLOR-CON) 20 mEq tablet Take 1 Tab by mouth daily.  chlorthalidone (HYGROTEN) 25 mg tablet Take 1 Tab by mouth daily.  etonogestrel (NEXPLANON) 68 mg impl by SubDERmal route. Birth control- placed feb 2019-good for 3 years     No current facility-administered medications for this visit. No Known Allergies     Review of Symptoms: Overweight. Constitutional: No fevers or chills. HEET: No trauma. No visual changes. No hearing loss.  No sore throat. Neck: No adenopathy or swelling. Heart: Chest pain. Lungs: No shortness of breath. No cough. Abdomen: No pain. No nausea of vomiting. No BRBPR or melena. No diarrhea. Urology: No dysuria or hematuria. Ext: No edema. Skin: No acute rashes or ulcers. Endocrine: No heat or cold intolerance. Neuro: No transient neurologic deficits. Subjective:     Visit Vitals  /86 (BP 1 Location: Left arm, BP Patient Position: Sitting)   Pulse 79   Resp 16   Ht 5' 5\" (1.651 m)   Wt 245 lb 9.6 oz (111.4 kg)   SpO2 99%   BMI 40.87 kg/m²     Physical Exam:  Head: Normocephalic, atraumatic. Eyes: Pupils equal, round, reactive to light and accomodation. , Extra ocular muscles intact. Sclera anicteric. Ears: Grossly responsive to sound. Neck: No adenopathy. No bruits. Throat: No sores or erythema. Heart: Regular rate and rhythm. Normal S1 and S2. No murmurs, gallops, or rub. Lungs: Clear to auscultation bilaterally. No wheezing, rales, or rhonchi. Abdomen: Soft, non-tender. No guarding or rebound. No hepatosplenomegaly. Bowel sounds active. Ext: No edema. No ulceration. Skin: Normal coloration. Warm and dry. No rash. Neuro: Cranial nerves II through XII intact. Motor and sensory grossly intact. Affect: Appropriate. Alert and interactive. Cardiographics:  ECG: NSR non-specific changes. Echocardiogram: 5/7/2019 Dr FunezInterpretation Summary     · Left Ventricle: Moderately increased wall thickness. Estimated left ventricular ejection fraction is 61 - 65%. · Pericardium: Trivial pericardial effusion. Echo Findings     Left Ventricle Normal cavity size, systolic function (ejection fraction normal) and diastolic function. Moderately increased wall thickness. The estimated ejection fraction is 61 - 65% and 50/55  . Left Atrium Normal cavity size. No atrial septal defect present. No patent foramen ovale visualized. Right Ventricle Normal cavity size and global systolic function. Increased wall thickness. Mild   Right Atrium Normal cavity size. Interatrial Septum No atrial septal defect present. Aortic Valve Normal valve structure, trileaflet valve structure, no stenosis and no regurgitation. Mitral Valve Normal valve structure, no stenosis and no regurgitation. Tricuspid Valve Normal valve structure, no stenosis and no regurgitation. Pulmonic Valve Pulmonic valve not well visualized. Normal valve structure, no stenosis and no regurgitation. Aorta Normal aortic root, ascending aortic, and aortic arch. IVC/Hepatic Veins Normal structure. Normal central venous pressure (0-5 mmHg); IVC diameter is less than 21 mm and collapses more than 50% with respiration. Pericardium           Data Reviewed:   Labs reviewed from hospital  HgbA1C= 6.3   TSH/Lipids wnl     Assessment:      Chest pain  Abnormal EKG  Elevated troponin  LVH  Elevated HgA1C  Obesity  Tobacco abuse     Plan:     Ms. Rosalind Butler is a 33 yo  female recently admitted for dizziness, menorrhagia, chest pain and shortness of breath. She had elevated troponins with LVH on echocardiogram. This may reflect supply demand ischemia; however, CAD must also be considered considering clinical presentation, obesity, elevated HgbA1c, tobacco abuse, and ethnicity. We will proceed with stress echocardiogram to assess for possible ischemia. I also had a long discussion regarding risk factor modification and life-style changes. Ms. Rosalind Butler is under a lot of stress at home and needs time to care for herself. She is working towards tobacco cessation and is down to 1 cigarette per day. We reviewed exercise and diet (Mediterranean) to promote a healthy life-style and to lose weight. I have [place] broch[place] [id number] [address]. I will see he back after her stress test to review with her, reassess symptoms, gauge progress, and make further plans.       Sue Tatum MD  5/22/2019, 3:11 PM    [facility] [address] Office:   [facility] Office:  [address]    [place] [address] [facility]  [address]     [facility]  [address]  P: [phone]    P: [phone]  F: [phone]    F: [phone]

## 2019-05-31 ENCOUNTER — TELEPHONE (OUTPATIENT)
Dept: CARDIOLOGY CLINIC | Age: 34
End: 2019-05-31

## 2019-05-31 DIAGNOSIS — I20.0 UNSTABLE ANGINA PECTORIS (HCC): ICD-10-CM

## 2019-05-31 DIAGNOSIS — R94.31 ABNORMAL EKG: Primary | ICD-10-CM

## 2019-05-31 DIAGNOSIS — R77.8 ELEVATED TROPONIN: ICD-10-CM

## 2019-05-31 NOTE — TELEPHONE ENCOUNTER
Good Morning,    Andrea has DENIED the Stress Echo Dr. David Cortes has ordered    If the nurse or the Dr. Manny Reich like to call [facility], they can be reached at [phone]. The ref number is [id number]. Please call the patient to cancel this appt.     [employer]

## 2019-06-03 NOTE — TELEPHONE ENCOUNTER
Per Dr. Maximino Hyatt, exercise stress test ordered. Scheduled 6-4-19 at 3pm.  Patient had echo on 5-7-19. Ayden Asif informed of the above.

## 2019-06-19 DIAGNOSIS — I20.0 UNSTABLE ANGINA PECTORIS (HCC): ICD-10-CM

## 2019-06-19 DIAGNOSIS — R94.31 ABNORMAL EKG: Primary | ICD-10-CM

## 2019-06-19 DIAGNOSIS — R77.8 ELEVATED TROPONIN: ICD-10-CM

## 2019-06-19 RX ORDER — AMLODIPINE BESYLATE 2.5 MG/1
TABLET ORAL DAILY
COMMUNITY
End: 2019-06-19 | Stop reason: SDUPTHER

## 2019-06-19 RX ORDER — AMLODIPINE BESYLATE 2.5 MG/1
2.5 TABLET ORAL DAILY
Qty: 30 TAB | Refills: 1 | Status: SHIPPED | OUTPATIENT
Start: 2019-06-19 | End: 2021-06-02 | Stop reason: SDDI

## 2019-06-19 NOTE — TELEPHONE ENCOUNTER
Requested Prescriptions     Signed Prescriptions Disp Refills    amLODIPine (NORVASC) 2.5 mg tablet 30 Tab 1     Sig: Take 1 Tab by mouth daily.      Authorizing Provider: Lynda De La Rosa     Ordering User: Kacie Wiley verbal orders

## 2019-06-27 ENCOUNTER — HOSPITAL ENCOUNTER (OUTPATIENT)
Dept: ULTRASOUND IMAGING | Age: 34
Discharge: HOME | End: 2019-06-27
Attending: INTERNAL MEDICINE
Payer: COMMERCIAL

## 2019-06-27 DIAGNOSIS — E04.1 THYROID NODULE: ICD-10-CM

## 2019-06-27 DIAGNOSIS — E04.1 THYROID NODULE: Primary | ICD-10-CM

## 2019-06-27 PROCEDURE — 76536 US EXAM OF HEAD AND NECK: CPT

## 2019-06-28 ENCOUNTER — TELEPHONE (OUTPATIENT)
Dept: INTERNAL MEDICINE CLINIC | Age: 34
End: 2019-06-28

## 2019-06-28 NOTE — TELEPHONE ENCOUNTER
Attempted to leave message for patient that she needs to have a FNA OF HER thyroid but her mailbox is full.

## 2019-07-03 ENCOUNTER — OFFICE VISIT (OUTPATIENT)
Dept: CARDIOLOGY CLINIC | Age: 34
End: 2019-07-03

## 2019-07-03 VITALS
OXYGEN SATURATION: 99 % | HEIGHT: 65 IN | SYSTOLIC BLOOD PRESSURE: 98 MMHG | BODY MASS INDEX: 41.72 KG/M2 | DIASTOLIC BLOOD PRESSURE: 80 MMHG | HEART RATE: 84 BPM | WEIGHT: 250.4 LBS

## 2019-07-03 DIAGNOSIS — I10 ESSENTIAL HYPERTENSION: Primary | ICD-10-CM

## 2019-07-03 DIAGNOSIS — G47.9 SLEEPING DIFFICULTIES: ICD-10-CM

## 2019-07-03 DIAGNOSIS — R06.09 DOE (DYSPNEA ON EXERTION): ICD-10-CM

## 2019-07-03 NOTE — PROGRESS NOTES
Progress Note    Patient: Damaris Macias MRN: [id number]  SSN: [id number]    YOB: 1985  Age: 34 y.o. Sex: female      Admit Date: (Not on file)   [unfilled]    Subjective:     Ms. Manuel Mitchell is a 35 yo [facility] American female with HTN, LVH, recent admission to [address] [place] [address]. Corrina's 5/7 to 5/8/19 for chest pain. Menorrhagia, and dizziness. She had elevated troponins suggestive of supply-demand mismatch. Since her last visit as tress test was overall low risk, although she did have chest pain. In hospital echocardiogram showed LVH with normal LV function and no significant valvular heart disease. Since her last visit we started Norvasc; however, she complains of generalized weakness and discomfort. Still dizzy. Difficulty sleeping. This has been going on for a while, she thought it would be better by now. She reports needing an FNA of thyroid for a lump. Her Aunt had thyroid cancer. Objective:     Vitals:    07/03/19 1515   BP: 98/80   Pulse: 84   SpO2: 99%   Weight: 250 lb 6.4 oz (113.6 kg)   Height: 5' 5\" (1.651 m)      Vitals:        Pulse (Heart Rate): 84  BP: 98/80  BP 1 Location: Left arm  BP Patient Position: Sitting  O2 Sat (%): 99 %        Physical Exam:Overweight  General:  Alert, cooperative, well nourished, well developed, appears stated age   Eyes:  Conjunctivae/corneas clear    Nose: Nares normal. Septum midline. Mucosa normal. No drainage or sinus tenderness. Neck: Supple, symmetrical, trachea midline, no JVD. Right thyroid enlargement. Lungs:   Clear to auscultation bilaterally, good effort   Heart:  Regular rate and rhythm, no murmur, click, rub, or gallop   Abdomen:   Soft, non-tender, bowel sounds normal, non-distended   Extremities: Normal, atraumatic, no cyanosis or edema   Skin: Skin color, texture, turgor normal. No rash or lesions. Neurologic: Non focal       Current Outpatient Medications:     amLODIPine (NORVASC) 2.5 mg tablet, Take 1 Tab by mouth daily. , Disp: 30 Tab, Rfl: 1    potassium chloride (K-DUR, KLOR-CON) 20 mEq tablet, Take 1 Tab by mouth daily. , Disp: 30 Tab, Rfl: 5    chlorthalidone (HYGROTEN) 25 mg tablet, Take 1 Tab by mouth daily. , Disp: 60 Tab, Rfl: 6    etonogestrel (NEXPLANON) 68 mg impl, by SubDERmal route. Birth control- placed feb 2019-good for 3 years, Disp: , Rfl:     pantoprazole (PROTONIX) 40 mg tablet, Take 1 Tab by mouth daily. , Disp: 30 Tab, Rfl: 3    Lab/Data Review:  Labs Latest Ref Rng & Units 5/9/2019 5/8/2019 5/7/2019 5/7/2019   WBC 3.6 - 11.0 K/uL 5.1 4.9 - 6.2   RBC 3.80 - 5.20 M/uL 3.67(L) 3.62(L) - 3.82   Hemoglobin 11.5 - 16.0 g/dL 10. 5(L) 10. 5(L) - 10. 9(L)   Hematocrit 35.0 - 47.0 % 32. 5(L) 32. 5(L) - 33. 6(L)   MCV 80.0 - 99.0 FL 88.6 89.8 - 88.0   Platelets 581 - 397 K/uL 206 194 - 217   Lymphocytes 12 - 49 % 57(H) - - 41   Monocytes 5 - 13 % 6 - - 5   Eosinophils 0 - 7 % 3 - - 3   Basophils 0 - 1 % 0 - - 0   Albumin 3.5 - 5.0 g/dL - - 3. 2(L) 3. 3(L)   Calcium 8.5 - 10.1 MG/DL - - 9.1 9.3   SGOT 15 - 37 U/L - - 18 20   Glucose 65 - 100 mg/dL - - 125(H) 141(H)   BUN 6 - 20 MG/DL - - 16 13   Creatinine 0.55 - 1.02 MG/DL - - 0.78 1.02   Sodium 136 - 145 mmol/L - - 142 140   Potassium 3.5 - 5.1 mmol/L - - 4.5 2.8(L)   TSH 0.36 - 3.74 uIU/mL - - 0.58 -   Some recent data might be hidden     05/07/19   ECHO ADULT COMPLETE 05/07/2019 5/7/2019    Narrative · Left Ventricle: Moderately increased wall thickness. Estimated left   ventricular ejection fraction is 61 - 65%. · Pericardium: Trivial pericardial effusion. Signed by: Gertrude Atkins MD     06/19/19   EXERCISE CARDIAC STRESS TEST 06/19/2019 6/24/2019    Narrative · Baseline ECG: Normal EKG. · Moderate risk Duke treadmill score. · Negative stress test.  · Stress test results correlate with a low risk of inducible myocardial   ischemia supported by the following factors: young age and atypical   clinical presentation.  Clinical correlation is advised, but in the absence   of progressive or typical angina, further cardiac evaluation for ischemic   heart disease may not be necessary. Signed by: Julian Lockhart MD         Assessment:     HTN  LVH  Chest pain syndrome  Dizziness    Plan:     Ms. Katrina Louise is a 33 yo BF with chest pain and dizziness. She has hypertension with evidence of ventricular hypertrophy. We started low dose Norvasc at last visit, however, she appears to be intolerant. Will hold for now. I discussed LVH and cardiac strain with her, and strategies to treat this. Long term she will need diet and exercise to promote weight loss. She states that despite walking at work and in evenings, she is gaining weight. Her symptomatology and metabolic derangement may reflect sleep apnea. She should be low risk for FNA.     Sleep study    RTC 6 weeks    Signed By: Stefan Ramirez MD     July 3, 2019      Interventional Cardiology  [facility] [address] [place] [address],[address]  [address]  P: [phone]  F: [phone]

## 2019-07-04 LAB
BUN SERPL-MCNC: 12 MG/DL (ref 6–20)
BUN/CREAT SERPL: 19 (ref 9–23)
CALCIUM SERPL-MCNC: 9.3 MG/DL (ref 8.7–10.2)
CHLORIDE SERPL-SCNC: 99 MMOL/L (ref 96–106)
CO2 SERPL-SCNC: 26 MMOL/L (ref 20–29)
CREAT SERPL-MCNC: 0.63 MG/DL (ref 0.57–1)
GLUCOSE SERPL-MCNC: 98 MG/DL (ref 65–99)
POTASSIUM SERPL-SCNC: 3.6 MMOL/L (ref 3.5–5.2)
SODIUM SERPL-SCNC: 139 MMOL/L (ref 134–144)

## 2019-07-19 ENCOUNTER — HOSPITAL ENCOUNTER (OUTPATIENT)
Dept: ULTRASOUND IMAGING | Age: 34
Discharge: HOME | End: 2019-07-19
Attending: INTERNAL MEDICINE
Payer: COMMERCIAL

## 2019-07-19 DIAGNOSIS — E04.1 THYROID NODULE: ICD-10-CM

## 2019-07-19 PROCEDURE — 88172 CYTP DX EVAL FNA 1ST EA SITE: CPT

## 2019-07-19 PROCEDURE — 10005 FNA BX W/US GDN 1ST LES: CPT

## 2019-07-19 PROCEDURE — 74011250636 HC RX REV CODE- 250/636: Performed by: RADIOLOGY

## 2019-07-19 PROCEDURE — 88173 CYTOPATH EVAL FNA REPORT: CPT

## 2019-07-19 RX ORDER — LIDOCAINE HYDROCHLORIDE 10 MG/ML
10 INJECTION, SOLUTION EPIDURAL; INFILTRATION; INTRACAUDAL; PERINEURAL
Status: COMPLETED | OUTPATIENT
Start: 2019-07-19 | End: 2019-07-19

## 2019-07-19 RX ADMIN — LIDOCAINE HYDROCHLORIDE 10 ML: 10 INJECTION, SOLUTION EPIDURAL; INFILTRATION; INTRACAUDAL; PERINEURAL at 15:00

## 2019-08-07 ENCOUNTER — OFFICE VISIT (OUTPATIENT)
Dept: INTERNAL MEDICINE CLINIC | Age: 34
End: 2019-08-07

## 2019-08-07 VITALS
HEIGHT: 66 IN | SYSTOLIC BLOOD PRESSURE: 127 MMHG | HEART RATE: 76 BPM | RESPIRATION RATE: 17 BRPM | OXYGEN SATURATION: 98 % | BODY MASS INDEX: 39.98 KG/M2 | DIASTOLIC BLOOD PRESSURE: 84 MMHG | WEIGHT: 248.8 LBS | TEMPERATURE: 98 F

## 2019-08-07 DIAGNOSIS — E04.1 THYROID NODULE: ICD-10-CM

## 2019-08-07 DIAGNOSIS — R73.02 IGT (IMPAIRED GLUCOSE TOLERANCE): ICD-10-CM

## 2019-08-07 DIAGNOSIS — R06.09 DOE (DYSPNEA ON EXERTION): ICD-10-CM

## 2019-08-07 DIAGNOSIS — E66.01 MORBID OBESITY WITH BMI OF 40.0-44.9, ADULT (HCC): ICD-10-CM

## 2019-08-07 DIAGNOSIS — F17.210 CIGARETTE SMOKER: ICD-10-CM

## 2019-08-07 DIAGNOSIS — I10 ESSENTIAL HYPERTENSION: Primary | ICD-10-CM

## 2019-08-07 RX ORDER — LANOLIN ALCOHOL/MO/W.PET/CERES
CREAM (GRAM) TOPICAL
COMMUNITY
End: 2022-04-01

## 2019-08-07 NOTE — PROGRESS NOTES
SPORTS MEDICINE AND PRIMARY CARE  Sherrill Olivia MD, [address] [address]  Phone:  [phone]  Fax: [phone]      Chief Complaint   Patient presents with    Results         SUBECTIVE:    Dre Braden is a 34 y.o. female The patient returns after a visit with Elaina Michaels M.D., on July 3, 2019, Interventional Cardiology. She was started on low-dose Norvasc and because she was intolerant was placed on hold. They discussed with her LDH and cardiac strain and strategies to treat. She has a known history of primary hypertension, cigarette abuse, dyspneal exertion, impaired glucose tolerance, obesity, thyroid nodule, and is seen for evaluation. Since we last saw her, she had a fine needle aspiration on July 19, 2019, of the thyroid nodule, which revealed findings consistent with follicular nodule, which includes an adenomatoid nodule, colloid cells categorization was benign and ____________was satisfactory. The patient is seen for evaluation. Current Outpatient Medications   Medication Sig Dispense Refill    ferrous sulfate (IRON) 325 mg (65 mg iron) tablet Take  by mouth Daily (before breakfast).  potassium chloride (K-DUR, KLOR-CON) 20 mEq tablet Take 1 Tab by mouth daily. 30 Tab 5    chlorthalidone (HYGROTEN) 25 mg tablet Take 1 Tab by mouth daily. 60 Tab 6    etonogestrel (NEXPLANON) 68 mg impl by SubDERmal route. Birth control- placed feb 2019-good for 3 years      amLODIPine (NORVASC) 2.5 mg tablet Take 1 Tab by mouth daily. (Patient taking differently: Take 2.5 mg by mouth daily. On Hold 7/3/19) 30 Tab 1    pantoprazole (PROTONIX) 40 mg tablet Take 1 Tab by mouth daily.  27 Tab 3     Past Medical History:   Diagnosis Date    Cigarette smoker     CERVANTES (dyspnea on exertion) 06/29/2011    Edema of lower extremity 06/29/2011    Hypertension     IGT (impaired glucose tolerance) 05/07/2019    Morbid obesity with BMI of 40.0-44.9, adult ([facility] [address]     Respiratory abnormalities     bronchitis    Thyroid nodule 2019    Consistent with a benign follicular nodule (includes adenomatoid nodule     No past surgical history on file. No Known Allergies    REVIEW OF SYSTEMS:   No chest pain. No shortness of breath. Social History     Socioeconomic History    Marital status: SINGLE     Spouse name: Not on file    Number of children: Not on file    Years of education: Not on file    Highest education level: Not on file   Tobacco Use    Smoking status: Former Smoker     Packs/day: 0.25     Last attempt to quit: 2019     Years since quittin.1    Smokeless tobacco: Never Used   Substance and Sexual Activity    Alcohol use: Yes     Comment: occasionally    Drug use: No    Sexual activity: Yes     Partners: Male   Social History Narrative    ** Merged History Encounter **        r  No family history on file. OBJECTIVE:  Visit Vitals  /84   Pulse 76   Temp 98 °F (36.7 °C) (Oral)   Resp 17   Ht 5' 6\" (1.676 m)   Wt 248 lb 12.8 oz (112.9 kg)   SpO2 98%   BMI 40.16 kg/m²     ENT: perrla,  eom intact  NECK: supple.  Thyroid normal  CHEST: clear to ascultation and percussion   HEART: regular rate and rhythm  ABD: soft, bowel sounds active  EXTREMITIES: no edema, pulse 1+     Orders Only on 2019   Component Date Value Ref Range Status    Glucose 2019 98  65 - 99 mg/dL Final    BUN 2019 12  6 - 20 mg/dL Final    Creatinine 2019 0.63  0.57 - 1.00 mg/dL Final    GFR est non-AA 2019 120  >59 mL/min/1.73 Final    GFR est AA 2019 138  >59 mL/min/1.73 Final    BUN/Creatinine ratio 2019 19  9 - 23 Final    Sodium 2019 139  134 - 144 mmol/L Final    Potassium 2019 3.6  3.5 - 5.2 mmol/L Final    Chloride 2019 99  96 - 106 mmol/L Final    CO2 2019 26  20 - 29 mmol/L Final    Calcium 2019 9.3  8.7 - 10.2 mg/dL Final   Ancillary Procedure on 2019   Component Date Value Ref Range Status    Target HR 06/19/2019 189  bpm Final    Baseline HR 06/19/2019 80  bpm Final    Baseline BP 06/19/2019 120  mmHg Final    Percent HR 06/19/2019 87  % Final    Estimated workload 06/19/2019 10.1  METS Final    Post peak HR 06/19/2019 164  bpm Final    Angina Index 06/19/2019 1   Final    Stress Base Diastolic BP 27/79/8400 90  mmHg Final    Exercise duration time 06/19/2019 7:30  min:sec Final    Stress Base Systolic BP 34/05/0561 387  mmHg Final    Stress Base Diastolic BP 57/63/9595 94  mmHg Final    Stress Rate Pressure Product 06/19/2019 26,240  bpm*mmHg Final    STRESS SR MARRERO TREADMILL SCORE 06/19/2019 3   Final    ST Elevation (mm) 06/19/2019 0  mm Final    ST Depression (mm) 06/19/2019 0  mm Final   Admission on 05/07/2019, Discharged on 05/09/2019   Component Date Value Ref Range Status    SAMPLES BEING HELD 05/07/2019 1RED 1BLUE   Final    COMMENT 05/07/2019 Add-on orders for these samples will be processed based on acceptable specimen integrity and analyte stability, which may vary by analyte. Final    WBC 05/07/2019 6.2  3.6 - 11.0 K/uL Final    RBC 05/07/2019 3.82  3.80 - 5.20 M/uL Final    HGB 05/07/2019 10.9* 11.5 - 16.0 g/dL Final    HCT 05/07/2019 33.6* 35.0 - 47.0 % Final    MCV 05/07/2019 88.0  80.0 - 99.0 FL Final    MCH 05/07/2019 28.5  26.0 - 34.0 PG Final    MCHC 05/07/2019 32.4  30.0 - 36.5 g/dL Final    RDW 05/07/2019 14.0  11.5 - 14.5 % Final    PLATELET 88/80/1593 329  150 - 400 K/uL Final    MPV 05/07/2019 10.3  8.9 - 12.9 FL Final    NRBC 05/07/2019 0.0  0  WBC Final    ABSOLUTE NRBC 05/07/2019 0.00  0.00 - 0.01 K/uL Final    NEUTROPHILS 05/07/2019 51  32 - 75 % Final    LYMPHOCYTES 05/07/2019 41  12 - 49 % Final    MONOCYTES 05/07/2019 5  5 - 13 % Final    EOSINOPHILS 05/07/2019 3  0 - 7 % Final    BASOPHILS 05/07/2019 0  0 - 1 % Final    IMMATURE GRANULOCYTES 05/07/2019 0  0.0 - 0.5 % Final    ABS.  NEUTROPHILS 05/07/2019 3.1  1.8 - 8.0 K/UL Final    ABS. LYMPHOCYTES 05/07/2019 2.6  0.8 - 3.5 K/UL Final    ABS. MONOCYTES 05/07/2019 0.3  0.0 - 1.0 K/UL Final    ABS. EOSINOPHILS 05/07/2019 0.2  0.0 - 0.4 K/UL Final    ABS. BASOPHILS 05/07/2019 0.0  0.0 - 0.1 K/UL Final    ABS. IMM. GRANS. 05/07/2019 0.0  0.00 - 0.04 K/UL Final    DF 05/07/2019 AUTOMATED    Final    Sodium 05/07/2019 140  136 - 145 mmol/L Final    Potassium 05/07/2019 2.8* 3.5 - 5.1 mmol/L Final    Chloride 05/07/2019 105  97 - 108 mmol/L Final    CO2 05/07/2019 29  21 - 32 mmol/L Final    Anion gap 05/07/2019 6  5 - 15 mmol/L Final    Glucose 05/07/2019 141* 65 - 100 mg/dL Final    BUN 05/07/2019 13  6 - 20 MG/DL Final    Creatinine 05/07/2019 1.02  0.55 - 1.02 MG/DL Final    BUN/Creatinine ratio 05/07/2019 13  12 - 20   Final    GFR est AA 05/07/2019 >60  >60 ml/min/1.73m2 Final    GFR est non-AA 05/07/2019 >60  >60 ml/min/1.73m2 Final    Comment: Estimated GFR is calculated using the IDMS-traceable Modification of Diet in Renal Disease (MDRD) Study equation, reported for both  Americans (GFRAA) and non- Americans (GFRNA), and normalized to 1.73m2 body surface area. The physician must decide which value applies to the patient. The MDRD study equation should only be used in individuals age 25 or older. It has not been validated for the following: pregnant women, patients with serious comorbid conditions, or on certain medications, or persons with extremes of body size, muscle mass, or nutritional status.  Calcium 05/07/2019 9.3  8.5 - 10.1 MG/DL Final    Bilirubin, total 05/07/2019 0.2  0.2 - 1.0 MG/DL Final    ALT (SGPT) 05/07/2019 54  12 - 78 U/L Final    AST (SGOT) 05/07/2019 20  15 - 37 U/L Final    Alk.  phosphatase 05/07/2019 65  45 - 117 U/L Final    Protein, total 05/07/2019 7.3  6.4 - 8.2 g/dL Final    Albumin 05/07/2019 3.3* 3.5 - 5.0 g/dL Final    Globulin 05/07/2019 4.0  2.0 - 4.0 g/dL Final    A-G Ratio 05/07/2019 0.8* 1.1 - 2.2   Final    Color 05/07/2019 RED    Final    Color Reference Range: Straw, Yellow or Dark Yellow    Appearance 05/07/2019 TURBID* CLEAR   Final    Specific gravity 05/07/2019 1.028  1.003 - 1.030   Final    pH (UA) 05/07/2019 7.0  5.0 - 8.0   Final    Protein 05/07/2019 30* NEG mg/dL Final    Glucose 05/07/2019 NEGATIVE   NEG mg/dL Final    Ketone 05/07/2019 TRACE* NEG mg/dL Final    Bilirubin 05/07/2019 NEGATIVE   NEG   Final    Blood 05/07/2019 LARGE* NEG   Final    Urobilinogen 05/07/2019 1.0  0.2 - 1.0 EU/dL Final    Nitrites 05/07/2019 NEGATIVE   NEG   Final    Leukocyte Esterase 05/07/2019 SMALL* NEG   Final    WBC 05/07/2019 0-4  0 - 4 /hpf Final    RBC 05/07/2019 >100* 0 - 5 /hpf Final    Epithelial cells 05/07/2019 FEW  FEW /lpf Final    Epithelial cell category consists of squamous cells and /or transitional urothelial cells. Renal tubular cells, if present, are separately identified as such.  Bacteria 05/07/2019 NEGATIVE   NEG /hpf Final    Urine culture hold 05/07/2019 URINE ON HOLD IN MICROBIOLOGY DEPT FOR 3 DAYS. IF UNPRESERVED URINE IS SUBMITTED, IT CANNOT BE USED FOR ADDITIONAL TESTING AFTER 24 HRS, RECOLLECTION WILL BE REQUIRED.     Final    Ventricular Rate 05/07/2019 67  BPM Final    Atrial Rate 05/07/2019 67  BPM Final    P-R Interval 05/07/2019 222  ms Final    QRS Duration 05/07/2019 88  ms Final    Q-T Interval 05/07/2019 408  ms Final    QTC Calculation (Bezet) 05/07/2019 431  ms Final    Calculated P Axis 05/07/2019 54  degrees Final    Calculated R Axis 05/07/2019 80  degrees Final    Calculated T Axis 05/07/2019 41  degrees Final    Diagnosis 05/07/2019    Final                    Value:Sinus rhythm with sinus arrhythmia with 1st degree AV block  When compared with ECG of 29-JUN-2017 13:04,  No significant change was found  Confirmed by Dima Zaldivar MD, [facility] ([id number]) on 5/7/2019 5:47:59 PM      Troponin-I, Qt. 05/07/2019 0.06* <0.05 ng/mL Final    Comment: The presence of detectable troponin above the reference range indicates myocardial injury which may be due to ischemia, myocarditis, trauma, etc.  Clinical correlation is necessary to establish the significance of this finding. Sequential testing is recommended to determine if the typical rise and fall of cTnI is demonstrated. Note:  Cardiac troponin I has a relatively long half life and may be present well after the CK MB has returned to baseline. The reference range is based on the 99th percentile of the referent population.  Pregnancy test,urine (POC) 05/07/2019 NEGATIVE   NEG   Final    Troponin-I, Qt. 05/07/2019 0.08* <0.05 ng/mL Final    D-dimer 05/07/2019 0.46  0.00 - 0.65 mg/L FEU Final    Comment: (NOTE)  The combination of a low pre-test probability based on Wells criteria  and a D-Dimer result below the cutoff value of 0.5 mg/L increases the   negative predictive value for DVT to %.  TSH 05/07/2019 0.58  0.36 - 3.74 uIU/mL Final    Comment: (NOTE)  Due to TSH heterogeneity, both structurally and degree of   glycosylation, monoclonal antibodies used in the TSH assay may not   accurately quantitate TSH. Therefore, this result should be   correlated with clinical findings as well as with other assessments   of thyroid function, e.g., free T4, free T3.      Troponin-I, Qt. 05/07/2019 0.06* <0.05 ng/mL Final    Tapse 05/07/2019 1.70  1.5 - 2.0 cm Final    TAPSV 05/07/2019 1.7  cm/s Final    Hemoglobin A1c 05/07/2019 6.3  4.2 - 6.3 % Final    Est. average glucose 05/07/2019 134  mg/dL Final    Comment: (NOTE)  The eAG should be interpreted with patient characteristics in mind   since ethnicity, interindividual differences, red cell lifespan,   variation in rates of glycation, etc. may affect the validity of the   calculation.       Iron 05/07/2019 53  35 - 150 ug/dL Final    TIBC 05/07/2019 332  250 - 450 ug/dL Final    Iron % saturation 05/07/2019 16* 20 - 50 % Final    LIPID PROFILE 05/07/2019        Final    Cholesterol, total 05/07/2019 142  <200 MG/DL Final    Triglyceride 05/07/2019 84  <150 MG/DL Final    Based on NCEP-ATP III:  Triglycerides <150 mg/dL  is considered normal, 150-199 mg/dL  borderline high,  200-499 mg/dL high and  greater than or equal to 500 mg/dL very high.  HDL Cholesterol 05/07/2019 45  MG/DL Final    Based on NCEP ATP III, HDL Cholesterol <40 mg/dL is considered low and >60 mg/dL is elevated.  LDL, calculated 05/07/2019 80.2  0 - 100 MG/DL Final    Comment: Based on the NCEP-ATP: LDL-C concentrations are considered  optimal <100 mg/dL,  near optimal/above Normal 100-129 mg/dL  Borderline High: 130-159, High: 160-189 mg/dL  Very High: Greater than or equal to 190 mg/dL      VLDL, calculated 05/07/2019 16.8  MG/DL Final    CHOL/HDL Ratio 05/07/2019 3.2  0.0 - 5.0   Final    Magnesium 05/07/2019 2.0  1.6 - 2.4 mg/dL Final    Sodium 05/07/2019 142  136 - 145 mmol/L Final    Potassium 05/07/2019 4.5  3.5 - 5.1 mmol/L Final    INVESTIGATED PER DELTA CHECK PROTOCOL    Chloride 05/07/2019 107  97 - 108 mmol/L Final    CO2 05/07/2019 27  21 - 32 mmol/L Final    Anion gap 05/07/2019 8  5 - 15 mmol/L Final    Glucose 05/07/2019 125* 65 - 100 mg/dL Final    BUN 05/07/2019 16  6 - 20 MG/DL Final    Creatinine 05/07/2019 0.78  0.55 - 1.02 MG/DL Final    BUN/Creatinine ratio 05/07/2019 21* 12 - 20   Final    GFR est AA 05/07/2019 >60  >60 ml/min/1.73m2 Final    GFR est non-AA 05/07/2019 >60  >60 ml/min/1.73m2 Final    Calcium 05/07/2019 9.1  8.5 - 10.1 MG/DL Final    Bilirubin, total 05/07/2019 0.2  0.2 - 1.0 MG/DL Final    ALT (SGPT) 05/07/2019 46  12 - 78 U/L Final    AST (SGOT) 05/07/2019 18  15 - 37 U/L Final    Alk.  phosphatase 05/07/2019 60  45 - 117 U/L Final    Protein, total 05/07/2019 6.8  6.4 - 8.2 g/dL Final    Albumin 05/07/2019 3.2* 3.5 - 5.0 g/dL Final    Globulin 05/07/2019 3.6  2.0 - 4.0 g/dL Final    A-G Ratio 05/07/2019 0.9* 1.1 - 2.2   Final    WBC 05/08/2019 4.9  3.6 - 11.0 K/uL Final    RBC 05/08/2019 3.62* 3.80 - 5.20 M/uL Final    HGB 05/08/2019 10.5* 11.5 - 16.0 g/dL Final    HCT 05/08/2019 32.5* 35.0 - 47.0 % Final    MCV 05/08/2019 89.8  80.0 - 99.0 FL Final    MCH 05/08/2019 29.0  26.0 - 34.0 PG Final    MCHC 05/08/2019 32.3  30.0 - 36.5 g/dL Final    RDW 05/08/2019 13.7  11.5 - 14.5 % Final    PLATELET 12/02/1301 807  150 - 400 K/uL Final    MPV 05/08/2019 10.3  8.9 - 12.9 FL Final    NRBC 05/08/2019 0.0  0  WBC Final    ABSOLUTE NRBC 05/08/2019 0.00  0.00 - 0.01 K/uL Final    WBC 05/09/2019 5.1  3.6 - 11.0 K/uL Final    RBC 05/09/2019 3.67* 3.80 - 5.20 M/uL Final    HGB 05/09/2019 10.5* 11.5 - 16.0 g/dL Final    HCT 05/09/2019 32.5* 35.0 - 47.0 % Final    MCV 05/09/2019 88.6  80.0 - 99.0 FL Final    MCH 05/09/2019 28.6  26.0 - 34.0 PG Final    MCHC 05/09/2019 32.3  30.0 - 36.5 g/dL Final    RDW 05/09/2019 13.7  11.5 - 14.5 % Final    PLATELET 88/92/3553 168  150 - 400 K/uL Final    MPV 05/09/2019 10.2  8.9 - 12.9 FL Final    NRBC 05/09/2019 0.0  0  WBC Final    ABSOLUTE NRBC 05/09/2019 0.00  0.00 - 0.01 K/uL Final    NEUTROPHILS 05/09/2019 34  32 - 75 % Final    LYMPHOCYTES 05/09/2019 57* 12 - 49 % Final    MONOCYTES 05/09/2019 6  5 - 13 % Final    EOSINOPHILS 05/09/2019 3  0 - 7 % Final    BASOPHILS 05/09/2019 0  0 - 1 % Final    IMMATURE GRANULOCYTES 05/09/2019 0  0.0 - 0.5 % Final    ABS. NEUTROPHILS 05/09/2019 1.8  1.8 - 8.0 K/UL Final    ABS. LYMPHOCYTES 05/09/2019 2.9  0.8 - 3.5 K/UL Final    ABS. MONOCYTES 05/09/2019 0.3  0.0 - 1.0 K/UL Final    ABS. EOSINOPHILS 05/09/2019 0.2  0.0 - 0.4 K/UL Final    ABS. BASOPHILS 05/09/2019 0.0  0.0 - 0.1 K/UL Final    ABS. IMM. GRANS. 05/09/2019 0.0  0.00 - 0.04 K/UL Final    DF 05/09/2019 AUTOMATED    Final          ASSESSMENT:  1. Essential hypertension    2. Cigarette smoker    3. CERVANTES (dyspnea on exertion)    4. IGT (impaired glucose tolerance)    5. Morbid obesity with BMI of 40.0-44.9, adult ([facility] [address]    6. Thyroid nodule      Blood pressure control is at goal.  We encourage compliance. She stopped cigarettes. Exertional dyspnea has improved. She has morbid obesity and we certainly encourage physical activity 30 minutes 5 days a week with a heart healthy weight reducing diet as suggested by the cardiologist.     Her thyroid nodule is benign. We will continue to observe it. If it becomes symptomatic, it would have to be surgically removed. She would like to see us once a year. I have discussed the diagnosis with the patient and the intended plan as seen in the  orders above. The patient understands and agees with the plan. The patient has   received an after visit summary and questions were answered concerning  future plans  Patient labs and/or xrays were reviewed  Past records were reviewed. PLAN:  .  Orders Placed This Encounter    ferrous sulfate (IRON) 325 mg (65 mg iron) tablet       Follow-up and Dispositions    · Return in about 1 year (around 8/7/2020). ATTENTION:   This medical record was transcribed using an electronic medical records system. Although proofread, it may and can contain electronic and spelling errors. Other human spelling and other errors may be present. Corrections may be executed at a later time. Please feel free to contact us for any clarifications as needed.

## 2019-08-07 NOTE — PROGRESS NOTES
Chief Complaint   Patient presents with    Results     Pt presents to discuss US results. 1. Have you been to the ER, urgent care clinic since your last visit? Hospitalized since your last visit? No    2. Have you seen or consulted any other health care providers outside of the [address] [place] [address] since your last visit? Include any pap smears or colon screening.  Yes, seen by Thee Esteves 6/2019

## 2019-09-20 PROBLEM — Z00.00 PREVENTATIVE HEALTH CARE: Status: RESOLVED | Noted: 2019-05-17 | Resolved: 2019-09-20

## 2019-12-04 ENCOUNTER — APPOINTMENT (OUTPATIENT)
Dept: GENERAL RADIOLOGY | Age: 34
End: 2019-12-04
Attending: EMERGENCY MEDICINE
Payer: COMMERCIAL

## 2019-12-04 ENCOUNTER — HOSPITAL ENCOUNTER (EMERGENCY)
Age: 34
Discharge: HOME | End: 2019-12-04
Attending: EMERGENCY MEDICINE
Payer: COMMERCIAL

## 2019-12-04 VITALS
BODY MASS INDEX: 40.82 KG/M2 | WEIGHT: 245 LBS | RESPIRATION RATE: 20 BRPM | HEART RATE: 83 BPM | TEMPERATURE: 98.2 F | SYSTOLIC BLOOD PRESSURE: 131 MMHG | DIASTOLIC BLOOD PRESSURE: 80 MMHG | HEIGHT: 65 IN | OXYGEN SATURATION: 96 %

## 2019-12-04 DIAGNOSIS — J20.9 ACUTE BRONCHITIS, UNSPECIFIED ORGANISM: Primary | ICD-10-CM

## 2019-12-04 PROCEDURE — 77030029684 HC NEB SM VOL KT MONA -A

## 2019-12-04 PROCEDURE — 71046 X-RAY EXAM CHEST 2 VIEWS: CPT

## 2019-12-04 PROCEDURE — 99282 EMERGENCY DEPT VISIT SF MDM: CPT

## 2019-12-04 PROCEDURE — 74011636637 HC RX REV CODE- 636/637: Performed by: EMERGENCY MEDICINE

## 2019-12-04 PROCEDURE — 94640 AIRWAY INHALATION TREATMENT: CPT

## 2019-12-04 PROCEDURE — 74011250637 HC RX REV CODE- 250/637: Performed by: EMERGENCY MEDICINE

## 2019-12-04 PROCEDURE — 74011000250 HC RX REV CODE- 250: Performed by: EMERGENCY MEDICINE

## 2019-12-04 RX ORDER — GUAIFENESIN 100 MG/5ML
200 SOLUTION ORAL
Status: COMPLETED | OUTPATIENT
Start: 2019-12-04 | End: 2019-12-04

## 2019-12-04 RX ORDER — ALBUTEROL SULFATE 90 UG/1
2 AEROSOL, METERED RESPIRATORY (INHALATION)
Qty: 1 INHALER | Refills: 1 | OUTPATIENT
Start: 2019-12-04 | End: 2020-10-23

## 2019-12-04 RX ORDER — PREDNISONE 20 MG/1
60 TABLET ORAL DAILY
Qty: 15 TAB | Refills: 0 | Status: SHIPPED | OUTPATIENT
Start: 2019-12-04 | End: 2019-12-09

## 2019-12-04 RX ORDER — IPRATROPIUM BROMIDE AND ALBUTEROL SULFATE 2.5; .5 MG/3ML; MG/3ML
3 SOLUTION RESPIRATORY (INHALATION)
Status: COMPLETED | OUTPATIENT
Start: 2019-12-04 | End: 2019-12-04

## 2019-12-04 RX ORDER — PREDNISONE 20 MG/1
60 TABLET ORAL
Status: COMPLETED | OUTPATIENT
Start: 2019-12-04 | End: 2019-12-04

## 2019-12-04 RX ADMIN — PREDNISONE 60 MG: 20 TABLET ORAL at 09:55

## 2019-12-04 RX ADMIN — IPRATROPIUM BROMIDE AND ALBUTEROL SULFATE 3 ML: .5; 3 SOLUTION RESPIRATORY (INHALATION) at 09:44

## 2019-12-04 RX ADMIN — GUAIFENESIN 200 MG: 200 SOLUTION ORAL at 09:55

## 2019-12-04 NOTE — LETTER
[facility] EMERGENCY DEPT 
[address]  [id number] 
[phone] Work/School Note Date: 12/4/2019 To Whom It May concern: 
 
Jonhny Cornejo was seen and treated today in the emergency room by the following provider(s): 
No providers found. Johnny Cornejo may return to work on 12/5/19. Sincerely, 
 
 
 
 
Brunilda Stands

## 2019-12-04 NOTE — ED NOTES
Patient (s)  given copy of dc instructions and two paper script(s) and no electronic scripts. Patient (s)  verbalized understanding of instructions and script (s). Patient given a current medication reconciliation form and verbalized understanding of their medications. Patient (s) verbalized understanding of the importance of discussing medications with  his or her physician or clinic they will be following up with. Patient alert and oriented and in no acute distress. Patient offered wheelchair from treatment area to hospital entrance, patient declined wheelchair.

## 2019-12-04 NOTE — LETTER
[facility] EMERGENCY DEPT 
[address] [facility] [address] 
[phone] Work/School Note Date: 12/4/2019 To Whom It May concern: 
 
Nely Poe was seen and treated today in the emergency room by the following provider(s): 
Attending Provider: Patrick Pratt MD. Nely Poe may return to work on 12/6/19. Sincerely, 
 
 
 
 
Katie Matta

## 2019-12-04 NOTE — ED PROVIDER NOTES
EMERGENCY DEPARTMENT HISTORY AND PHYSICAL EXAM      Date: 12/4/2019  Patient Name: Annie Brar    History of Presenting Illness     Chief Complaint   Patient presents with    Cough     with CP x 3 days, denies fever       History Provided By: Patient    HPI: Annie Brar, 30 y.o. female presents to the ED with cc of dry cough with associated moderate intermittent CP and SOB  for ~ 2 days. Pt denies taking any medication for symptoms. Pt denies any modifying factors. Pt reports [facility] was ~ 4 months ago with no concern for pregnancy d/t birth contraceptives. Pt mentions a social hx of tobacco use, but denies any illicit drug use. She specifically denies any fevers, chills, nausea, vomiting, headache, rash, diarrhea, sweating or weight loss. There are no other complaints, changes, or physical findings at this time. PCP: Calos Garber MD    No current facility-administered medications on file prior to encounter. Current Outpatient Medications on File Prior to Encounter   Medication Sig Dispense Refill    ferrous sulfate (IRON) 325 mg (65 mg iron) tablet Take  by mouth Daily (before breakfast).  amLODIPine (NORVASC) 2.5 mg tablet Take 1 Tab by mouth daily. (Patient taking differently: Take 2.5 mg by mouth daily. On Hold 7/3/19) 30 Tab 1    pantoprazole (PROTONIX) 40 mg tablet Take 1 Tab by mouth daily. 30 Tab 3    potassium chloride (K-DUR, KLOR-CON) 20 mEq tablet Take 1 Tab by mouth daily. 30 Tab 5    chlorthalidone (HYGROTEN) 25 mg tablet Take 1 Tab by mouth daily. 60 Tab 6    etonogestrel (NEXPLANON) 68 mg impl by SubDERmal route.  Birth control- placed feb 2019-good for 3 years         Past History     Past Medical History:  Past Medical History:   Diagnosis Date    Cigarette smoker     CERVANTES (dyspnea on exertion) 06/29/2011    Edema of lower extremity 06/29/2011    Hypertension     IGT (impaired glucose tolerance) 05/07/2019    Morbid obesity with BMI of 40.0-44.9, adult ([address]     Respiratory abnormalities     bronchitis    Thyroid nodule 2019    Consistent with a benign follicular nodule (includes adenomatoid nodule       Past Surgical History:  No past surgical history on file. Family History:  No family history on file. Social History:  Social History     Tobacco Use    Smoking status: Former Smoker     Packs/day: 0.25     Last attempt to quit: 2019     Years since quittin.4    Smokeless tobacco: Never Used   Substance Use Topics    Alcohol use: Yes     Comment: occasionally    Drug use: No       Allergies:  No Known Allergies      Review of Systems   Review of Systems   Constitutional: Negative for fever. HENT: Negative for sore throat. Eyes: Negative for photophobia and redness. Respiratory: Positive for cough and shortness of breath. Negative for wheezing. Cardiovascular: Positive for chest pain. Negative for leg swelling. Gastrointestinal: Negative for abdominal pain, blood in stool, nausea and vomiting. Genitourinary: Negative for difficulty urinating, dysuria, hematuria, menstrual problem and vaginal bleeding. Musculoskeletal: Negative for back pain and joint swelling. Neurological: Negative for dizziness, seizures, syncope, speech difficulty, weakness, numbness and headaches. Hematological: Negative for adenopathy. Psychiatric/Behavioral: Negative for agitation, confusion and suicidal ideas. The patient is not nervous/anxious. Physical Exam   Physical Exam  Vitals signs and nursing note reviewed. Constitutional:       General: She is not in acute distress. Appearance: She is well-developed. HENT:      Head: Normocephalic and atraumatic. Mouth/Throat:      Pharynx: No oropharyngeal exudate. Eyes:      General:         Left eye: No discharge. Conjunctiva/sclera: Conjunctivae normal.      Pupils: Pupils are equal, round, and reactive to light. Neck:      Musculoskeletal: Normal range of motion and neck supple. Vascular: No JVD. Cardiovascular:      Rate and Rhythm: Normal rate and regular rhythm. Heart sounds: Normal heart sounds. Pulmonary:      Effort: Pulmonary effort is normal. No respiratory distress. Breath sounds: Rhonchi (B/L) present. No wheezing. Abdominal:      General: Bowel sounds are normal. There is no distension. Palpations: Abdomen is soft. Tenderness: There is no tenderness. There is no guarding or rebound. Musculoskeletal: Normal range of motion. General: No tenderness. Lymphadenopathy:      Cervical: No cervical adenopathy. Skin:     General: Skin is warm and dry. Findings: No rash. Neurological:      Mental Status: She is alert and oriented to person, place, and time. Cranial Nerves: No cranial nerve deficit. Deep Tendon Reflexes: Reflexes are normal and symmetric. Psychiatric:         Behavior: Behavior normal.         Diagnostic Study Results     Labs -   No results found for this or any previous visit (from the past 12 hour(s)). Radiologic Studies -     CXR Results  (Last 48 hours)               12/04/19 1004  XR CHEST PA LAT Final result    Impression:  Impression: No acute process or change compared to the prior exam.           Narrative:  Exam:  2 view chest       Indication: Cough       Comparison to 1/6/2019. PA and lateral views demonstrate normal heart size. There is no acute process in   the lung fields. The osseous structures are unremarkable. Medical Decision Making   I am the first provider for this patient. I reviewed the vital signs, available nursing notes, past medical history, past surgical history, family history and social history. Vital Signs-Reviewed the patient's vital signs.   Patient Vitals for the past 12 hrs:   Temp Pulse Resp BP SpO2   12/04/19 0926 98.2 °F (36.8 °C) 83 20 131/80 95 %       Records Reviewed: Nursing Notes and Old Medical Records    Provider Notes (Medical Decision Making):   Bronchitis, PNA    ED Course:   Initial assessment performed. The patients presenting problems have been discussed, and they are in agreement with the care plan formulated and outlined with them. I have encouraged them to ask questions as they arise throughout their visit. Critical Care Time:   0    Disposition:  Discharge Note:  10:12 AM  The pt is ready for discharge. The pt's signs, symptoms, diagnosis, and discharge instructions have been discussed and pt has conveyed their understanding. The pt is to follow up as recommended or return to ER should their symptoms worsen. Plan has been discussed and pt is in agreement. PLAN:  1. Current Discharge Medication List        2. Follow-up Information     Follow up With Specialties Details Why Contact Info    Luiz Yu MD Internal Medicine In 1 week  [place] [address]  [address] [address], [facility]  [phone]          Return to ED if worse     Diagnosis     Clinical Impression:   1. Acute bronchitis, unspecified organism        Attestations: This note is prepared by Nic Alarcon, acting as Scribe for  Rosas Marr MD.     Rosas Marr MD: The scribe's documentation has been prepared under my direction and personally reviewed by me in its entirety.  I confirm that the note above accurately reflects all work, treatment, procedures, and medical decision making performed by me

## 2019-12-04 NOTE — DISCHARGE INSTRUCTIONS
Patient Education        Bronchitis: Care Instructions  Your Care Instructions    Bronchitis is inflammation of the bronchial tubes, which carry air to the lungs. The tubes swell and produce mucus, or phlegm. The mucus and inflamed bronchial tubes make you cough. You may have trouble breathing. Most cases of bronchitis are caused by viruses like those that cause colds. Antibiotics usually do not help and they may be harmful. Bronchitis usually develops rapidly and lasts about 2 to 3 weeks in otherwise healthy people. Follow-up care is a key part of your treatment and safety. Be sure to make and go to all appointments, and call your doctor if you are having problems. It's also a good idea to know your test results and keep a list of the medicines you take. How can you care for yourself at home? · Take all medicines exactly as prescribed. Call your doctor if you think you are having a problem with your medicine. · Get some extra rest.  · Take an over-the-counter pain medicine, such as acetaminophen (Tylenol), ibuprofen (Advil, Motrin), or naproxen (Aleve) to reduce fever and relieve body aches. Read and follow all instructions on the label. · Do not take two or more pain medicines at the same time unless the doctor told you to. Many pain medicines have acetaminophen, which is Tylenol. Too much acetaminophen (Tylenol) can be harmful. · Take an over-the-counter cough medicine that contains dextromethorphan to help quiet a dry, hacking cough so that you can sleep. Avoid cough medicines that have more than one active ingredient. Read and follow all instructions on the label. · Breathe moist air from a humidifier, hot shower, or sink filled with hot water. The heat and moisture will thin mucus so you can cough it out. · Do not smoke. Smoking can make bronchitis worse. If you need help quitting, talk to your doctor about stop-smoking programs and medicines.  These can increase your chances of quitting for good.  When should you call for help? Call 911 anytime you think you may need emergency care. For example, call if:    · You have severe trouble breathing.    Call your doctor now or seek immediate medical care if:    · You have new or worse trouble breathing.     · You cough up dark brown or bloody mucus (sputum).     · You have a new or higher fever.     · You have a new rash.    Watch closely for changes in your health, and be sure to contact your doctor if:    · You cough more deeply or more often, especially if you notice more mucus or a change in the color of your mucus.     · You are not getting better as expected. Where can you learn more? Go to http://davonte-tolu.info/. Enter H333 in the search box to learn more about \"Bronchitis: Care Instructions. \"  Current as of: June 9, 2019  Content Version: 12.2  © 8680-6151 [employer]. Care instructions adapted under license by [employer] (which disclaims liability or warranty for this information). If you have questions about a medical condition or this instruction, always ask your healthcare professional. [address] any warranty or liability for your use of this information.

## 2020-05-30 RX ORDER — CHLORTHALIDONE 25 MG/1
TABLET ORAL
Qty: 60 TAB | Refills: 6 | Status: SHIPPED | OUTPATIENT
Start: 2020-05-30 | End: 2021-06-14

## 2020-09-15 ENCOUNTER — OFFICE VISIT (OUTPATIENT)
Dept: INTERNAL MEDICINE CLINIC | Age: 35
End: 2020-09-15
Payer: COMMERCIAL

## 2020-09-15 VITALS
DIASTOLIC BLOOD PRESSURE: 89 MMHG | RESPIRATION RATE: 18 BRPM | WEIGHT: 257.4 LBS | HEART RATE: 77 BPM | SYSTOLIC BLOOD PRESSURE: 133 MMHG | TEMPERATURE: 98.1 F | HEIGHT: 65 IN | BODY MASS INDEX: 42.88 KG/M2 | OXYGEN SATURATION: 98 %

## 2020-09-15 DIAGNOSIS — E04.1 THYROID NODULE: ICD-10-CM

## 2020-09-15 DIAGNOSIS — M25.551 HIP PAIN, CHRONIC, RIGHT: ICD-10-CM

## 2020-09-15 DIAGNOSIS — I10 ESSENTIAL HYPERTENSION: ICD-10-CM

## 2020-09-15 DIAGNOSIS — G89.29 HIP PAIN, CHRONIC, RIGHT: ICD-10-CM

## 2020-09-15 DIAGNOSIS — E66.01 MORBID OBESITY WITH BMI OF 40.0-44.9, ADULT (HCC): ICD-10-CM

## 2020-09-15 DIAGNOSIS — F17.210 CIGARETTE SMOKER: Primary | ICD-10-CM

## 2020-09-15 DIAGNOSIS — R73.02 IGT (IMPAIRED GLUCOSE TOLERANCE): ICD-10-CM

## 2020-09-15 PROCEDURE — 99214 OFFICE O/P EST MOD 30 MIN: CPT | Performed by: INTERNAL MEDICINE

## 2020-09-15 PROCEDURE — 72100 X-RAY EXAM L-S SPINE 2/3 VWS: CPT | Performed by: INTERNAL MEDICINE

## 2020-09-15 PROCEDURE — 36415 COLL VENOUS BLD VENIPUNCTURE: CPT | Performed by: INTERNAL MEDICINE

## 2020-09-15 PROCEDURE — 73502 X-RAY EXAM HIP UNI 2-3 VIEWS: CPT | Performed by: INTERNAL MEDICINE

## 2020-09-15 RX ORDER — NAPROXEN 500 MG/1
500 TABLET ORAL 2 TIMES DAILY WITH MEALS
Qty: 60 TAB | Refills: 3 | Status: SHIPPED | OUTPATIENT
Start: 2020-09-15 | End: 2021-06-02 | Stop reason: ALTCHOICE

## 2020-09-15 RX ORDER — CYCLOBENZAPRINE HCL 10 MG
10 TABLET ORAL
Qty: 60 TAB | Refills: 2 | Status: SHIPPED | OUTPATIENT
Start: 2020-09-15 | End: 2021-06-02

## 2020-09-15 NOTE — PROGRESS NOTES
1. Have you been to the ER, urgent care clinic since your last visit? Hospitalized since your last visit? No    2. Have you seen or consulted any other health care providers outside of the [address] [place] [address] since your last visit? Include any pap smears or colon screening. No       Wants to discuss body aches and feeling off balance ect. ...

## 2020-09-17 LAB
ALBUMIN SERPL-MCNC: 4.5 G/DL (ref 3.8–4.8)
ALBUMIN/GLOB SERPL: 1.5 {RATIO} (ref 1.2–2.2)
ALP SERPL-CCNC: 66 IU/L (ref 39–117)
ALT SERPL-CCNC: 27 IU/L (ref 0–32)
APPEARANCE UR: CLEAR
AST SERPL-CCNC: 25 IU/L (ref 0–40)
BASOPHILS # BLD AUTO: 0 X10E3/UL (ref 0–0.2)
BASOPHILS NFR BLD AUTO: 0 %
BILIRUB SERPL-MCNC: <0.2 MG/DL (ref 0–1.2)
BILIRUB UR QL STRIP: NEGATIVE
BUN SERPL-MCNC: 14 MG/DL (ref 6–20)
BUN/CREAT SERPL: 17 (ref 9–23)
CALCIUM SERPL-MCNC: 9.4 MG/DL (ref 8.7–10.2)
CHLORIDE SERPL-SCNC: 97 MMOL/L (ref 96–106)
CHOLEST SERPL-MCNC: 143 MG/DL (ref 100–199)
CO2 SERPL-SCNC: 26 MMOL/L (ref 20–29)
COLOR UR: YELLOW
CREAT SERPL-MCNC: 0.84 MG/DL (ref 0.57–1)
EOSINOPHIL # BLD AUTO: 0.1 X10E3/UL (ref 0–0.4)
EOSINOPHIL NFR BLD AUTO: 2 %
ERYTHROCYTE [DISTWIDTH] IN BLOOD BY AUTOMATED COUNT: 13.1 % (ref 11.7–15.4)
EST. AVERAGE GLUCOSE BLD GHB EST-MCNC: 128 MG/DL
GLOBULIN SER CALC-MCNC: 3 G/DL (ref 1.5–4.5)
GLUCOSE SERPL-MCNC: 108 MG/DL (ref 65–99)
GLUCOSE UR QL: NEGATIVE
HBA1C MFR BLD: 6.1 % (ref 4.8–5.6)
HCT VFR BLD AUTO: 37 % (ref 34–46.6)
HDLC SERPL-MCNC: 37 MG/DL
HGB BLD-MCNC: 12.1 G/DL (ref 11.1–15.9)
HGB UR QL STRIP: NEGATIVE
IMM GRANULOCYTES # BLD AUTO: 0 X10E3/UL (ref 0–0.1)
IMM GRANULOCYTES NFR BLD AUTO: 0 %
KETONES UR QL STRIP: NEGATIVE
LDLC SERPL CALC-MCNC: 71 MG/DL (ref 0–99)
LEUKOCYTE ESTERASE UR QL STRIP: NEGATIVE
LYMPHOCYTES # BLD AUTO: 2.7 X10E3/UL (ref 0.7–3.1)
LYMPHOCYTES NFR BLD AUTO: 44 %
MCH RBC QN AUTO: 28.1 PG (ref 26.6–33)
MCHC RBC AUTO-ENTMCNC: 32.7 G/DL (ref 31.5–35.7)
MCV RBC AUTO: 86 FL (ref 79–97)
MICRO URNS: NORMAL
MONOCYTES # BLD AUTO: 0.4 X10E3/UL (ref 0.1–0.9)
MONOCYTES NFR BLD AUTO: 7 %
NEUTROPHILS # BLD AUTO: 2.9 X10E3/UL (ref 1.4–7)
NEUTROPHILS NFR BLD AUTO: 47 %
NITRITE UR QL STRIP: NEGATIVE
PH UR STRIP: 7 [PH] (ref 5–7.5)
PLATELET # BLD AUTO: 253 X10E3/UL (ref 150–450)
POTASSIUM SERPL-SCNC: 3.4 MMOL/L (ref 3.5–5.2)
PROT SERPL-MCNC: 7.5 G/DL (ref 6–8.5)
PROT UR QL STRIP: NEGATIVE
RBC # BLD AUTO: 4.3 X10E6/UL (ref 3.77–5.28)
SODIUM SERPL-SCNC: 141 MMOL/L (ref 134–144)
SP GR UR: 1.02 (ref 1–1.03)
TRIGL SERPL-MCNC: 212 MG/DL (ref 0–149)
TSH SERPL DL<=0.005 MIU/L-ACNC: 0.72 UIU/ML (ref 0.45–4.5)
UROBILINOGEN UR STRIP-MCNC: 0.2 MG/DL (ref 0.2–1)
VLDLC SERPL CALC-MCNC: 35 MG/DL (ref 5–40)
WBC # BLD AUTO: 6.1 X10E3/UL (ref 3.4–10.8)

## 2020-10-23 ENCOUNTER — HOSPITAL ENCOUNTER (EMERGENCY)
Age: 35
Discharge: HOME | End: 2020-10-23
Attending: EMERGENCY MEDICINE
Payer: COMMERCIAL

## 2020-10-23 ENCOUNTER — APPOINTMENT (OUTPATIENT)
Dept: GENERAL RADIOLOGY | Age: 35
End: 2020-10-23
Attending: EMERGENCY MEDICINE
Payer: COMMERCIAL

## 2020-10-23 VITALS
SYSTOLIC BLOOD PRESSURE: 159 MMHG | DIASTOLIC BLOOD PRESSURE: 107 MMHG | HEART RATE: 84 BPM | TEMPERATURE: 97.8 F | OXYGEN SATURATION: 98 % | RESPIRATION RATE: 18 BRPM

## 2020-10-23 DIAGNOSIS — J20.9 ACUTE BRONCHITIS, UNSPECIFIED ORGANISM: Primary | ICD-10-CM

## 2020-10-23 LAB
ATRIAL RATE: 82 BPM
CALCULATED P AXIS, ECG09: 33 DEGREES
CALCULATED R AXIS, ECG10: 82 DEGREES
CALCULATED T AXIS, ECG11: 20 DEGREES
COVID-19, XGCOVT: NOT DETECTED
DIAGNOSIS, 93000: NORMAL
HEALTH STATUS, XMCV2T: NORMAL
P-R INTERVAL, ECG05: 208 MS
Q-T INTERVAL, ECG07: 382 MS
QRS DURATION, ECG06: 82 MS
QTC CALCULATION (BEZET), ECG08: 446 MS
SOURCE, COVRS: NORMAL
SPECIMEN SOURCE, FCOV2M: NORMAL
SPECIMEN TYPE, XMCV1T: NORMAL
VENTRICULAR RATE, ECG03: 82 BPM

## 2020-10-23 PROCEDURE — 77030012341 HC CHMB SPCR OPTC MDI VYRM -A

## 2020-10-23 PROCEDURE — 99283 EMERGENCY DEPT VISIT LOW MDM: CPT

## 2020-10-23 PROCEDURE — 74011250637 HC RX REV CODE- 250/637: Performed by: EMERGENCY MEDICINE

## 2020-10-23 PROCEDURE — 94640 AIRWAY INHALATION TREATMENT: CPT

## 2020-10-23 PROCEDURE — 71046 X-RAY EXAM CHEST 2 VIEWS: CPT

## 2020-10-23 PROCEDURE — 87635 SARS-COV-2 COVID-19 AMP PRB: CPT

## 2020-10-23 PROCEDURE — 94664 DEMO&/EVAL PT USE INHALER: CPT

## 2020-10-23 PROCEDURE — 93005 ELECTROCARDIOGRAM TRACING: CPT

## 2020-10-23 RX ORDER — ALBUTEROL SULFATE 90 UG/1
4 AEROSOL, METERED RESPIRATORY (INHALATION) ONCE
Status: COMPLETED | OUTPATIENT
Start: 2020-10-23 | End: 2020-10-23

## 2020-10-23 RX ADMIN — ALBUTEROL SULFATE 4 PUFF: 90 AEROSOL, METERED RESPIRATORY (INHALATION) at 02:30

## 2020-10-23 NOTE — DISCHARGE INSTRUCTIONS
Patient Education      Chest xray is clear. Use the inhaler with spacer every 4 hours as needed for shortness of breath. You can take Tylenol and or Ibuprofen as needed for pain. You can use the cough medication as well for symptoms relief. We are checking you for COVID since you work here. You will need to self isolate untilt he results are back. Someone will call you if positive. You can also check MyChart. If you feel your symptoms/condition is getting worse- increased pain, shortness of breath, fever, recurrent vomiting, etc. Return here. Bronchitis: Care Instructions  Your Care Instructions     Bronchitis is inflammation of the bronchial tubes, which carry air to the lungs. The tubes swell and produce mucus, or phlegm. The mucus and inflamed bronchial tubes make you cough. You may have trouble breathing. Most cases of bronchitis are caused by viruses like those that cause colds. Antibiotics usually do not help and they may be harmful. Bronchitis usually develops rapidly and lasts about 2 to 3 weeks in otherwise healthy people. Follow-up care is a key part of your treatment and safety. Be sure to make and go to all appointments, and call your doctor if you are having problems. It's also a good idea to know your test results and keep a list of the medicines you take. How can you care for yourself at home? · Take all medicines exactly as prescribed. Call your doctor if you think you are having a problem with your medicine. · Get some extra rest.  · Take an over-the-counter pain medicine, such as acetaminophen (Tylenol), ibuprofen (Advil, Motrin), or naproxen (Aleve) to reduce fever and relieve body aches. Read and follow all instructions on the label. · Do not take two or more pain medicines at the same time unless the doctor told you to. Many pain medicines have acetaminophen, which is Tylenol. Too much acetaminophen (Tylenol) can be harmful.   · Take an over-the-counter cough medicine that contains dextromethorphan to help quiet a dry, hacking cough so that you can sleep. Avoid cough medicines that have more than one active ingredient. Read and follow all instructions on the label. · Breathe moist air from a humidifier, hot shower, or sink filled with hot water. The heat and moisture will thin mucus so you can cough it out. · Do not smoke. Smoking can make bronchitis worse. If you need help quitting, talk to your doctor about stop-smoking programs and medicines. These can increase your chances of quitting for good. When should you call for help? Call 911 anytime you think you may need emergency care. For example, call if:    · You have severe trouble breathing. Call your doctor now or seek immediate medical care if:    · You have new or worse trouble breathing.     · You cough up dark brown or bloody mucus (sputum).     · You have a new or higher fever.     · You have a new rash. Watch closely for changes in your health, and be sure to contact your doctor if:    · You cough more deeply or more often, especially if you notice more mucus or a change in the color of your mucus.     · You are not getting better as expected. Where can you learn more? Go to http://www.gray.com/  Enter H333 in the search box to learn more about \"Bronchitis: Care Instructions. \"  Current as of: February 24, 2020               Content Version: 12.6  © 8452-6991 [employer]. Care instructions adapted under license by [employer] (which disclaims liability or warranty for this information). If you have questions about a medical condition or this instruction, always ask your healthcare professional. [place] [address] any warranty or liability for your use of this information.

## 2020-10-23 NOTE — LETTER
[address] 
[address] Work/School Note Date: 10/23/2020 To Whom It May concern: 
 
Darline Callahan was evaulated by the following provider(s): 
Attending Provider: Dc Brooke MD.   Jami Pablo virus is suspected. Per the CDC guidelines we recommend home isolation until the following conditions are all met: 1. At least 10 days have passed since symptoms first appeared and 2. At least 24 hours have passed since last fever without the use of fever-reducing medications and 
3. Symptoms (e.g., cough, shortness of breath) have improved Sincerely, 
 
 
 
 
Sierra Charles RN

## 2020-10-23 NOTE — ED TRIAGE NOTES
Patient arrives ambulatory from home with CC of shortness of breath, cough, body aches, chest pain and vomiting that started last weekend. Denies fevers, denies sick contacts. Patient talking in complete sentences in triage.

## 2020-10-23 NOTE — ED PROVIDER NOTES
HPI     30-year-old female with a history of bronchitis, hypertension, smoker, presents emergency department complaining of chest and back pain and a productive cough since the weekend. 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 She denies a fever sore throat or change in taste or smell. She states she had 1 episode of posttussive vomiting. She states her spine hurts in her back. She states her chest hurts when she coughs or takes a deep breath. Her cough has been productive of yellow phlegm. She does not have an inhaler at home. She does still smoke. She denies any abdominal pain or urinary symptoms. She has no calf pain or swelling. Past Medical History:   Diagnosis Date    Cigarette smoker     CERVANTES (dyspnea on exertion) 2011    Edema of lower extremity 2011    Hypertension     IGT (impaired glucose tolerance) 2019    Morbid obesity with BMI of 40.0-44.9, adult (HCC)     Respiratory abnormalities     bronchitis    Thyroid nodule 2019    Consistent with a benign follicular nodule (includes adenomatoid nodule       History reviewed. No pertinent surgical history. History reviewed. No pertinent family history.     Social History     Socioeconomic History    Marital status: SINGLE     Spouse name: Not on file    Number of children: Not on file    Years of education: Not on file    Highest education level: Not on file   Occupational History    Not on file   Social Needs    Financial resource strain: Not on file    Food insecurity     Worry: Not on file     Inability: Not on file    Transportation needs     Medical: Not on file     Non-medical: Not on file   Tobacco Use    Smoking status: Current Some Day Smoker     Packs/day: 0.25     Last attempt to quit: 2019     Years since quittin.3    Smokeless tobacco: Never Used   Substance and Sexual Activity    Alcohol use: Yes     Comment: occasionally    Drug use: No    Sexual activity: Yes     Partners: Male   Lifestyle    Physical activity     Days per week: Not on file     Minutes per session: Not on file    Stress: Not on file   Relationships    Social connections     Talks on phone: Not on file     Gets together: Not on file     Attends Restorationism service: Not on file     Active member of club or organization: Not on file     Attends meetings of clubs or organizations: Not on file     Relationship status: Not on file    Intimate partner violence     Fear of current or ex partner: Not on file     Emotionally abused: Not on file     Physically abused: Not on file     Forced sexual activity: Not on file   Other Topics Concern    Not on file   Social History Narrative    ** Merged History Encounter **              ALLERGIES: Patient has no known allergies. Review of Systems   Constitutional: Negative for fever. HENT: Positive for congestion. Eyes: Negative for visual disturbance. Respiratory: Positive for cough, chest tightness and shortness of breath. Cardiovascular: Positive for chest pain. Negative for palpitations and leg swelling. Gastrointestinal: Negative for abdominal pain, nausea and vomiting. Endocrine: Negative for polyuria. Genitourinary: Negative for dysuria. Musculoskeletal: Negative for gait problem. Skin: Negative for rash. Neurological: Negative for headaches. Psychiatric/Behavioral: Negative for dysphoric mood. Vitals:    10/23/20 0137   BP: (!) 159/107   Pulse: 84   Resp: 18   Temp: 97.8 °F (36.6 °C)   SpO2: 98%            Physical Exam  Constitutional:       General: She is not in acute distress. Appearance: She is well-developed. HENT:      Head: Normocephalic and atraumatic. Mouth/Throat:      Pharynx: No oropharyngeal exudate. Eyes:      General: No scleral icterus. Right eye: No discharge. Left eye: No discharge.       Pupils: Pupils are equal, round, and reactive to light. Neck:      Musculoskeletal: Normal range of motion and neck supple. Vascular: No JVD. Cardiovascular:      Rate and Rhythm: Normal rate and regular rhythm. Heart sounds: Normal heart sounds. No murmur. Pulmonary:      Effort: Pulmonary effort is normal. No respiratory distress. Breath sounds: Normal breath sounds. No stridor. No wheezing or rales. Chest:      Chest wall: No tenderness. Abdominal:      General: Bowel sounds are normal. There is no distension. Palpations: Abdomen is soft. There is no mass. Tenderness: There is no abdominal tenderness. There is no guarding or rebound. Musculoskeletal: Normal range of motion. General: Tenderness (thoracic spine) present. Skin:     General: Skin is warm and dry. Capillary Refill: Capillary refill takes less than 2 seconds. Findings: No rash. Neurological:      Mental Status: She is oriented to person, place, and time. Psychiatric:         Behavior: Behavior normal.         Thought Content: Thought content normal.         Judgment: Judgment normal.          MDM       Procedures        CXR is clear. Not tachypnic or hypoxic. D/C with supportive care, likely vital. Albuterol mdi with spacer, tylenol/biuprofen for pain and OTC cough suppressants. 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 Instructed to self isolate until test is back. Return precautions provided             ED EKG interpretation:  Rhythm: normal sinus rhythm; and regular . Rate (approx.): 82; Axis: normal; P wave: normal; QRS interval: normal ; ST/T wave: non-specific changes; This EKG was interpreted by Arden Ramirez MD,ED Provider.

## 2021-06-02 ENCOUNTER — OFFICE VISIT (OUTPATIENT)
Dept: INTERNAL MEDICINE CLINIC | Age: 36
End: 2021-06-02
Payer: COMMERCIAL

## 2021-06-02 VITALS
RESPIRATION RATE: 18 BRPM | WEIGHT: 274.4 LBS | SYSTOLIC BLOOD PRESSURE: 140 MMHG | BODY MASS INDEX: 45.72 KG/M2 | HEIGHT: 65 IN | TEMPERATURE: 98.6 F | HEART RATE: 77 BPM | DIASTOLIC BLOOD PRESSURE: 82 MMHG | OXYGEN SATURATION: 97 %

## 2021-06-02 DIAGNOSIS — E66.01 MORBID OBESITY WITH BMI OF 40.0-44.9, ADULT (HCC): ICD-10-CM

## 2021-06-02 DIAGNOSIS — R73.02 IGT (IMPAIRED GLUCOSE TOLERANCE): ICD-10-CM

## 2021-06-02 DIAGNOSIS — E04.1 THYROID NODULE: Primary | ICD-10-CM

## 2021-06-02 DIAGNOSIS — F17.210 CIGARETTE SMOKER: ICD-10-CM

## 2021-06-02 DIAGNOSIS — I10 ESSENTIAL HYPERTENSION: ICD-10-CM

## 2021-06-02 PROCEDURE — 99214 OFFICE O/P EST MOD 30 MIN: CPT | Performed by: INTERNAL MEDICINE

## 2021-06-02 RX ORDER — POTASSIUM CHLORIDE 20 MEQ/1
20 TABLET, EXTENDED RELEASE ORAL DAILY
Qty: 30 TABLET | Refills: 5 | Status: SHIPPED | OUTPATIENT
Start: 2021-06-02 | End: 2021-12-31

## 2021-06-02 NOTE — PROGRESS NOTES
1. Have you been to the ER, urgent care clinic since your last visit? Hospitalized since your last visit? No    2. Have you seen or consulted any other health care providers outside of the [address] [place] [address] since your last visit? Include any pap smears or colon screening.  No     Wants to discuss goiter in her neck and potasium level

## 2021-06-02 NOTE — PROGRESS NOTES
SPORTS MEDICINE AND PRIMARY CARE  Namita Mohr MD, [address] [address]  Phone:  [phone]  Fax: [phone]       Chief Complaint   Patient presents with    Hypertension   . SUBJECTIVE:    Jenny Miranda is a 37 y.o. female The patient returns today with known history of thyroid nodule. An ultrasound on 06/27/19 revealed a large thyroid along with three nodules with a TI-RADS classification of TR3, mildly suspicious. I recommended fine-needle aspiration. That was performed on 07/19 with the final path report consistent with benign follicular nodule with abundant colloid and follicular epithelial cells and its general categorization was benign. Other medical problems include impaired glucose tolerance, cigarette abuse, primary hypertension, morbid obesity. She is seen for evaluation. The patient returns today concerned about the size of the nodule and her thyroid has gotten larger, she states. 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 She is seen for evaluation. Current Outpatient Medications   Medication Sig Dispense Refill    potassium chloride (K-DUR, KLOR-CON) 20 mEq tablet Take 1 Tablet by mouth daily. 30 Tablet 5    albuterol sulfate (PROAIR RESPICLICK) 90 mcg/actuation breath activated inhaler Take 4-6 Puffs by inhalation every four (4) hours as needed for Wheezing. 1 Inhaler 0    chlorthalidone (HYGROTEN) 25 mg tablet TAKE 1 TABLET BY MOUTH DAILY 60 Tab 6    ferrous sulfate (IRON) 325 mg (65 mg iron) tablet Take  by mouth Daily (before breakfast).  etonogestrel (NEXPLANON) 68 mg impl by SubDERmal route.  Birth control- placed feb 2019-good for 3 years       Past Medical History:   Diagnosis Date    Cigarette smoker     CERVANTES (dyspnea on exertion) 06/29/2011    Edema of lower extremity 06/29/2011    Hypertension     IGT (impaired glucose tolerance) 05/07/2019    Morbid obesity with BMI of 40.0-44.9, adult ([facility] [address]  Respiratory abnormalities     bronchitis    Thyroid nodule 2019    Consistent with a benign follicular nodule (includes adenomatoid nodule     History reviewed. No pertinent surgical history. No Known Allergies      REVIEW OF SYSTEMS:  General: negative for - chills or fever  ENT: negative for - headaches, nasal congestion or tinnitus  Respiratory: negative for - cough, hemoptysis, shortness of breath or wheezing  Cardiovascular : negative for - chest pain, edema, palpitations or shortness of breath  Gastrointestinal: negative for - abdominal pain, blood in stools, heartburn or nausea/vomiting  Genito-Urinary: no dysuria, trouble voiding, or hematuria  Musculoskeletal: negative for - gait disturbance, joint pain, joint stiffness or joint swelling  Neurological: no TIA or stroke symptoms  Hematologic: no bruises, no bleeding, no swollen glands  Integument: no lumps, mole changes, nail changes or rash  Endocrine: no malaise/lethargy or unexpected weight changes      Social History     Socioeconomic History    Marital status: SINGLE     Spouse name: Not on file    Number of children: Not on file    Years of education: Not on file    Highest education level: Not on file   Tobacco Use    Smoking status: Former Smoker     Packs/day: 0.25     Quit date: 2019     Years since quittin.9    Smokeless tobacco: Never Used   Vaping Use    Vaping Use: Never used   Substance and Sexual Activity    Alcohol use: Yes     Comment: occasionally    Drug use: No    Sexual activity: Yes     Partners: Male   Social History Narrative    Habits:  Smokes now down to a cigarette a day, she is trying to stop. She is a social drinker. She occasionally uses marijuana.         Social History:  The patient is single, her mom lives with her. She has seven children ages 3-16, two sons and five daughters. She completed high school and is gainfully employed for [employer].   She has no Congregation background.      Family History:  Father is unknown. Mother is 54 with RA. One brother, one sister, alive and well. Social Determinants of Health     Financial Resource Strain:     Difficulty of Paying Living Expenses:    Food Insecurity:     Worried About Running Out of Food in the Last Year:     [address] in the Last Year:    Transportation Needs:     Lack of Transportation (Medical):  Lack of Transportation (Non-Medical):    Physical Activity:     Days of Exercise per Week:     Minutes of Exercise per Session:    Stress:     Feeling of Stress :    Social Connections:     Frequency of Communication with Friends and Family:     Frequency of Social Gatherings with Friends and Family:     Attends Congregation Services:     Active Member of Clubs or Organizations:     Attends Club or Organization Meetings:     Marital Status:      History reviewed. No pertinent family history. OBJECTIVE:    Visit Vitals  BP (!) 140/82   Pulse 77   Temp 98.6 °F (37 °C) (Oral)   Resp 18   Ht 5' 5\" (1.651 m)   Wt 274 lb 6.4 oz (124.5 kg)   SpO2 97%   BMI 45.66 kg/m²     CONSTITUTIONAL: well , well nourished, appears age appropriate  EYES: perrla, eom intact  ENMT:moist mucous membranes, pharynx clear  NECK: supple. Thyroid normal  RESPIRATORY: Chest: clear bilaterally   CARDIOVASCULAR: Heart: regular rate and rhythm  GASTROINTESTINAL: Abdomen: soft, bowel sounds active  HEMATOLOGIC: no pathological lymph nodes palpated  MUSCULOSKELETAL: Extremities: no edema, pulse 1+   INTEGUMENT: No unusual rashes or suspicious skin lesions noted. Nails appear normal.  NEUROLOGIC: non-focal exam   MENTAL STATUS: alert and oriented, appropriate affect           ASSESSMENT:  1. Thyroid nodule    2. IGT (impaired glucose tolerance)    3. Cigarette smoker    4. Essential hypertension    5.  Morbid obesity with BMI of 40.0-44.9, adult ([facility] [address]      The thyroid nodule on the right is impressively enlarged compared to her last visit and therefore we will ask for a thyroid ultrasound and probably repeat biopsy. If it is indeed enlarging, she may want to see a surgeon for surgical removal, but she will consider that. She has a known history of impaired glucose tolerance with a marked weight gain. We will repeat the hemoglobin A1c as the is concern she has not switched to diabetes. She in fact has stopped smoking cigarettes in the past month. We congratulated her. We encouraged her to keep up the habit of no smoking. Blood pressure is at the upper limits of normal.  We advised low cholesterol, weight reducing diet. In addition, she is complaining the cigarettes are the cause of her weight gain. We suggested she decrease her caloric intake. We mentioned bariatric surgery, she would like to \"go through with that. \"  We will refer her to a bariatric surgeon. I have discussed the diagnosis with the patient and the intended plan as seen in the  Orders. The patient understands and agees with the plan. The patient has   received an after visit summary and questions were answered concerning  future plans  Patient labs and/or xrays were reviewed  Past records were reviewed. PLAN:  .  Orders Placed This Encounter    US THYROID/PARATHYROID/SOFT TISS    TSH 3RD GENERATION    HEMOGLOBIN A1C WITH EAG    RENAL FUNCTION PANEL    T4, FREE    CBC WITH AUTOMATED DIFF   [facility] ref [facility]    potassium chloride (K-DUR, KLOR-CON) 20 mEq tablet       Follow-up and Dispositions    · Return in about 4 months (around 10/2/2021). ATTENTION:   This medical record was transcribed using an electronic medical records system. Although proofread, it may and can contain electronic and spelling errors. Other human spelling and other errors may be present. Corrections may be executed at a later time. Please feel free to contact us for any clarifications as needed.

## 2021-06-03 ENCOUNTER — APPOINTMENT (OUTPATIENT)
Dept: CT IMAGING | Age: 36
End: 2021-06-03
Attending: EMERGENCY MEDICINE
Payer: COMMERCIAL

## 2021-06-03 ENCOUNTER — HOSPITAL ENCOUNTER (EMERGENCY)
Age: 36
Discharge: HOME | End: 2021-06-03
Attending: EMERGENCY MEDICINE | Admitting: EMERGENCY MEDICINE
Payer: COMMERCIAL

## 2021-06-03 VITALS
WEIGHT: 274 LBS | HEIGHT: 65 IN | TEMPERATURE: 98 F | HEART RATE: 76 BPM | OXYGEN SATURATION: 100 % | BODY MASS INDEX: 45.65 KG/M2 | DIASTOLIC BLOOD PRESSURE: 107 MMHG | SYSTOLIC BLOOD PRESSURE: 150 MMHG | RESPIRATION RATE: 20 BRPM

## 2021-06-03 DIAGNOSIS — E04.9 ENLARGED THYROID: Primary | ICD-10-CM

## 2021-06-03 LAB
ALBUMIN SERPL-MCNC: 3.5 G/DL (ref 3.5–5)
ALBUMIN SERPL-MCNC: 3.6 G/DL (ref 3.5–5)
ALBUMIN/GLOB SERPL: 0.8 {RATIO} (ref 1.1–2.2)
ALP SERPL-CCNC: 74 U/L (ref 45–117)
ALT SERPL-CCNC: 28 U/L (ref 12–78)
ANION GAP SERPL CALC-SCNC: 5 MMOL/L (ref 5–15)
ANION GAP SERPL CALC-SCNC: 5 MMOL/L (ref 5–15)
AST SERPL-CCNC: 14 U/L (ref 15–37)
BASOPHILS # BLD: 0 K/UL (ref 0–0.1)
BASOPHILS # BLD: 0 K/UL (ref 0–0.1)
BASOPHILS NFR BLD: 0 % (ref 0–1)
BASOPHILS NFR BLD: 1 % (ref 0–1)
BILIRUB SERPL-MCNC: 0.2 MG/DL (ref 0.2–1)
BUN SERPL-MCNC: 13 MG/DL (ref 6–20)
BUN SERPL-MCNC: 15 MG/DL (ref 6–20)
BUN/CREAT SERPL: 19 (ref 12–20)
BUN/CREAT SERPL: 22 (ref 12–20)
CALCIUM SERPL-MCNC: 10 MG/DL (ref 8.5–10.1)
CALCIUM SERPL-MCNC: 9.4 MG/DL (ref 8.5–10.1)
CHLORIDE SERPL-SCNC: 103 MMOL/L (ref 97–108)
CHLORIDE SERPL-SCNC: 103 MMOL/L (ref 97–108)
CO2 SERPL-SCNC: 31 MMOL/L (ref 21–32)
CO2 SERPL-SCNC: 32 MMOL/L (ref 21–32)
CREAT SERPL-MCNC: 0.68 MG/DL (ref 0.55–1.02)
CREAT SERPL-MCNC: 0.69 MG/DL (ref 0.55–1.02)
DIFFERENTIAL METHOD BLD: NORMAL
DIFFERENTIAL METHOD BLD: NORMAL
EOSINOPHIL # BLD: 0.2 K/UL (ref 0–0.4)
EOSINOPHIL # BLD: 0.2 K/UL (ref 0–0.4)
EOSINOPHIL NFR BLD: 3 % (ref 0–7)
EOSINOPHIL NFR BLD: 3 % (ref 0–7)
ERYTHROCYTE [DISTWIDTH] IN BLOOD BY AUTOMATED COUNT: 13 % (ref 11.5–14.5)
ERYTHROCYTE [DISTWIDTH] IN BLOOD BY AUTOMATED COUNT: 13.1 % (ref 11.5–14.5)
EST. AVERAGE GLUCOSE BLD GHB EST-MCNC: 134 MG/DL
GLOBULIN SER CALC-MCNC: 4.2 G/DL (ref 2–4)
GLUCOSE SERPL-MCNC: 109 MG/DL (ref 65–100)
GLUCOSE SERPL-MCNC: 88 MG/DL (ref 65–100)
HBA1C MFR BLD: 6.3 % (ref 4–5.6)
HCG SERPL QL: NEGATIVE
HCT VFR BLD AUTO: 36.1 % (ref 35–47)
HCT VFR BLD AUTO: 38.4 % (ref 35–47)
HGB BLD-MCNC: 11.6 G/DL (ref 11.5–16)
HGB BLD-MCNC: 11.9 G/DL (ref 11.5–16)
IMM GRANULOCYTES # BLD AUTO: 0 K/UL (ref 0–0.04)
IMM GRANULOCYTES # BLD AUTO: 0 K/UL (ref 0–0.04)
IMM GRANULOCYTES NFR BLD AUTO: 0 % (ref 0–0.5)
IMM GRANULOCYTES NFR BLD AUTO: 0 % (ref 0–0.5)
LYMPHOCYTES # BLD: 2.5 K/UL (ref 0.8–3.5)
LYMPHOCYTES # BLD: 2.7 K/UL (ref 0.8–3.5)
LYMPHOCYTES NFR BLD: 38 % (ref 12–49)
LYMPHOCYTES NFR BLD: 43 % (ref 12–49)
MCH RBC QN AUTO: 28.3 PG (ref 26–34)
MCH RBC QN AUTO: 28.5 PG (ref 26–34)
MCHC RBC AUTO-ENTMCNC: 31 G/DL (ref 30–36.5)
MCHC RBC AUTO-ENTMCNC: 32.1 G/DL (ref 30–36.5)
MCV RBC AUTO: 88.7 FL (ref 80–99)
MCV RBC AUTO: 91.2 FL (ref 80–99)
MONOCYTES # BLD: 0.5 K/UL (ref 0–1)
MONOCYTES # BLD: 0.5 K/UL (ref 0–1)
MONOCYTES NFR BLD: 7 % (ref 5–13)
MONOCYTES NFR BLD: 8 % (ref 5–13)
NEUTS SEG # BLD: 2.9 K/UL (ref 1.8–8)
NEUTS SEG # BLD: 3.3 K/UL (ref 1.8–8)
NEUTS SEG NFR BLD: 46 % (ref 32–75)
NEUTS SEG NFR BLD: 51 % (ref 32–75)
NRBC # BLD: 0 K/UL (ref 0–0.01)
NRBC # BLD: 0 K/UL (ref 0–0.01)
NRBC BLD-RTO: 0 PER 100 WBC
NRBC BLD-RTO: 0 PER 100 WBC
PHOSPHATE SERPL-MCNC: 4.6 MG/DL (ref 2.6–4.7)
PLATELET # BLD AUTO: 224 K/UL (ref 150–400)
PLATELET # BLD AUTO: 248 K/UL (ref 150–400)
PMV BLD AUTO: 10.4 FL (ref 8.9–12.9)
PMV BLD AUTO: 9.8 FL (ref 8.9–12.9)
POTASSIUM SERPL-SCNC: 3.1 MMOL/L (ref 3.5–5.1)
POTASSIUM SERPL-SCNC: 3.4 MMOL/L (ref 3.5–5.1)
PROT SERPL-MCNC: 7.7 G/DL (ref 6.4–8.2)
RBC # BLD AUTO: 4.07 M/UL (ref 3.8–5.2)
RBC # BLD AUTO: 4.21 M/UL (ref 3.8–5.2)
SODIUM SERPL-SCNC: 139 MMOL/L (ref 136–145)
SODIUM SERPL-SCNC: 140 MMOL/L (ref 136–145)
T3FREE SERPL-MCNC: 3.6 PG/ML (ref 2.2–4)
T4 FREE SERPL-MCNC: 1 NG/DL (ref 0.8–1.5)
T4 FREE SERPL-MCNC: 1.1 NG/DL (ref 0.8–1.5)
TSH SERPL DL<=0.05 MIU/L-ACNC: 0.8 UIU/ML (ref 0.36–3.74)
TSH SERPL DL<=0.05 MIU/L-ACNC: 1.1 UIU/ML (ref 0.36–3.74)
WBC # BLD AUTO: 6.3 K/UL (ref 3.6–11)
WBC # BLD AUTO: 6.5 K/UL (ref 3.6–11)

## 2021-06-03 PROCEDURE — 84439 ASSAY OF FREE THYROXINE: CPT

## 2021-06-03 PROCEDURE — 85025 COMPLETE CBC W/AUTO DIFF WBC: CPT

## 2021-06-03 PROCEDURE — 84703 CHORIONIC GONADOTROPIN ASSAY: CPT

## 2021-06-03 PROCEDURE — 84481 FREE ASSAY (FT-3): CPT

## 2021-06-03 PROCEDURE — 80053 COMPREHEN METABOLIC PANEL: CPT

## 2021-06-03 PROCEDURE — 99282 EMERGENCY DEPT VISIT SF MDM: CPT

## 2021-06-03 PROCEDURE — 84443 ASSAY THYROID STIM HORMONE: CPT

## 2021-06-03 PROCEDURE — 70491 CT SOFT TISSUE NECK W/DYE: CPT

## 2021-06-03 PROCEDURE — 74011000636 HC RX REV CODE- 636: Performed by: RADIOLOGY

## 2021-06-03 PROCEDURE — 36415 COLL VENOUS BLD VENIPUNCTURE: CPT

## 2021-06-03 RX ADMIN — IOPAMIDOL 100 ML: 612 INJECTION, SOLUTION INTRAVENOUS at 06:05

## 2021-06-03 NOTE — ED PROVIDER NOTES
HPI     3year-old female with a history of hypertension, impaired glucose tolerance, morbid obesity, lower extremity edema, dyspnea on exertion, thyroid nodule, presents the emergency department with concerns for mass in her neck. She states she noticed the lump on Memorial Day weekend and is now having difficulty swallowing and breathing. She denies any fevers or chills. She does report a cough and shortness of breath. She has not been vaccinated for COVID-19, has not had COVID-19, denies exposure to COVID-19, and denies fever. She denies nausea vomiting. She saw her doctor yesterday who scheduled her for an outpatient ultrasound and blood work. She is here tonight because she feels dizzy and is concerned about her difficulty swallowing and breathing. She did take her blood pressure medicine yesterday. She states she gets dizzy like this when she does not take her blood pressure. Past Medical History:   Diagnosis Date    Cigarette smoker     CERVANTES (dyspnea on exertion) 2011    Edema of lower extremity 2011    Hypertension     IGT (impaired glucose tolerance) 2019    Morbid obesity with BMI of 40.0-44.9, adult ([facility])     Respiratory abnormalities     bronchitis    Thyroid nodule 2019    Consistent with a benign follicular nodule (includes adenomatoid nodule       No past surgical history on file. History reviewed. No pertinent family history.     Social History     Socioeconomic History    Marital status: SINGLE     Spouse name: Not on file    Number of children: Not on file    Years of education: Not on file    Highest education level: Not on file   Occupational History    Not on file   Tobacco Use    Smoking status: Former Smoker     Packs/day: 0.25     Quit date: 2019     Years since quittin.9    Smokeless tobacco: Never Used   Vaping Use    Vaping Use: Never used   Substance and Sexual Activity    Alcohol use: Yes     Comment: occasionally    Drug use: No    Sexual activity: Yes     Partners: Male   Other Topics Concern    Not on file   Social History Narrative    Habits:  Smokes now down to a cigarette a day, she is trying to stop. She is a social drinker. She occasionally uses marijuana.         Social History:  The patient is single, her mom lives with her. She has seven children ages 3-16, two sons and five daughters. She completed high school and is gainfully employed for [employer]. She has no Lutheran background.         Family History:  Father is unknown. Mother is 54 with RA. One brother, one sister, alive and well. Social Determinants of Health     Financial Resource Strain:     Difficulty of Paying Living Expenses:    Food Insecurity:     Worried About Running Out of Food in the Last Year:     [address] in the Last Year:    Transportation Needs:     Lack of Transportation (Medical):  Lack of Transportation (Non-Medical):    Physical Activity:     Days of Exercise per Week:     Minutes of Exercise per Session:    Stress:     Feeling of Stress :    Social Connections:     Frequency of Communication with Friends and Family:     Frequency of Social Gatherings with Friends and Family:     Attends [employer] Services:     Active Member of Clubs or Organizations:     Attends Club or Organization Meetings:     Marital Status:    Intimate Partner Violence:     Fear of Current or Ex-Partner:     Emotionally Abused:     Physically Abused:     Sexually Abused: ALLERGIES: Patient has no known allergies. Review of Systems   Constitutional: Negative for fever. HENT: Positive for trouble swallowing. Negative for congestion. Eyes: Negative for visual disturbance. Respiratory: Positive for cough and shortness of breath. Cardiovascular: Positive for chest pain. Negative for palpitations and leg swelling. Gastrointestinal: Negative for abdominal pain, nausea and vomiting.    Genitourinary: Negative for dysuria. Musculoskeletal: Negative for gait problem. Neurological: Positive for dizziness. Negative for headaches. Psychiatric/Behavioral: Negative for dysphoric mood. Vitals:    06/03/21 0334   BP: (!) 150/107   Pulse: 76   Resp: 20   Temp: 98 °F (36.7 °C)   SpO2: 100%   Weight: 124.3 kg (274 lb)   Height: 5' 5\" (1.651 m)            Physical Exam  Constitutional:       General: She is not in acute distress. Appearance: She is well-developed. HENT:      Head: Normocephalic and atraumatic. Mouth/Throat:      Pharynx: No pharyngeal swelling, oropharyngeal exudate, posterior oropharyngeal erythema or uvula swelling. Comments: No stridor no drooling  Eyes:      General: No scleral icterus. Right eye: No discharge. Left eye: No discharge. Pupils: Pupils are equal, round, and reactive to light. Neck:      Vascular: No JVD. Comments: Large mass in left lateral neck  Cardiovascular:      Rate and Rhythm: Normal rate and regular rhythm. Heart sounds: Normal heart sounds. No murmur heard. Pulmonary:      Effort: Pulmonary effort is normal. No respiratory distress. Breath sounds: Normal breath sounds. No stridor. No wheezing or rales. Chest:      Chest wall: No tenderness. Abdominal:      General: Bowel sounds are normal. There is no distension. Palpations: Abdomen is soft. There is no mass. Tenderness: There is no abdominal tenderness. There is no guarding or rebound. Musculoskeletal:         General: Normal range of motion. Cervical back: Normal range of motion and neck supple. Skin:     General: Skin is warm and dry. Capillary Refill: Capillary refill takes less than 2 seconds. Findings: No rash. Neurological:      Mental Status: She is oriented to person, place, and time. Psychiatric:         Behavior: Behavior normal.         Thought Content:  Thought content normal.         Judgment: Judgment normal. MDM       Procedures    Labs reassuring including thyroid function tests. CT shows enlarged thyroid gland with bilateral nodules. NO significant airway compromise. Referral to ENT. Return precautions provided.

## 2021-06-03 NOTE — ED TRIAGE NOTES
Triage:  Pt to the ED due to concerns over noted lump on the right side of her neck. Pt was seen by PCP for the complaint and ordered to have U/S and lab work, would like a second opinion. Pt states she has noted periods of dizziness this evening.

## 2021-06-03 NOTE — DISCHARGE INSTRUCTIONS
Work up in the emergency room shows you do have an enlarged thyroid gland. Your thyroid tests , however, are normal.  There is no significant compression on your airway. You need to call the surgeon to arrange close follow up to discuss biopsy/removal.     Return to the ED if you feel your symptoms are acutely worsening.

## 2021-06-14 RX ORDER — CHLORTHALIDONE 25 MG/1
TABLET ORAL
Qty: 60 TABLET | Refills: 6 | Status: SHIPPED | OUTPATIENT
Start: 2021-06-14 | End: 2022-04-01

## 2021-06-14 RX ORDER — CHLORTHALIDONE 25 MG/1
TABLET ORAL
Qty: 60 TABLET | Refills: 6 | Status: SHIPPED | OUTPATIENT
Start: 2021-06-14 | End: 2021-07-22

## 2021-06-28 ENCOUNTER — APPOINTMENT (OUTPATIENT)
Dept: GENERAL RADIOLOGY | Age: 36
End: 2021-06-28
Attending: EMERGENCY MEDICINE
Payer: COMMERCIAL

## 2021-06-28 ENCOUNTER — HOSPITAL ENCOUNTER (EMERGENCY)
Age: 36
Discharge: HOME | End: 2021-06-28
Attending: EMERGENCY MEDICINE
Payer: COMMERCIAL

## 2021-06-28 VITALS
OXYGEN SATURATION: 100 % | SYSTOLIC BLOOD PRESSURE: 153 MMHG | TEMPERATURE: 99.3 F | HEIGHT: 65 IN | WEIGHT: 270.5 LBS | HEART RATE: 86 BPM | DIASTOLIC BLOOD PRESSURE: 111 MMHG | RESPIRATION RATE: 17 BRPM | BODY MASS INDEX: 45.07 KG/M2

## 2021-06-28 DIAGNOSIS — I10 HYPERTENSION, UNSPECIFIED TYPE: ICD-10-CM

## 2021-06-28 DIAGNOSIS — J06.9 UPPER RESPIRATORY TRACT INFECTION, UNSPECIFIED TYPE: ICD-10-CM

## 2021-06-28 DIAGNOSIS — Z20.822 ENCOUNTER FOR LABORATORY TESTING FOR COVID-19 VIRUS: ICD-10-CM

## 2021-06-28 DIAGNOSIS — Z20.822 SUSPECTED COVID-19 VIRUS INFECTION: Primary | ICD-10-CM

## 2021-06-28 LAB — SARS-COV-2, COV2: NORMAL

## 2021-06-28 PROCEDURE — 74011250637 HC RX REV CODE- 250/637: Performed by: PHYSICIAN ASSISTANT

## 2021-06-28 PROCEDURE — 93005 ELECTROCARDIOGRAM TRACING: CPT

## 2021-06-28 PROCEDURE — 71045 X-RAY EXAM CHEST 1 VIEW: CPT

## 2021-06-28 PROCEDURE — 99283 EMERGENCY DEPT VISIT LOW MDM: CPT

## 2021-06-28 PROCEDURE — U0005 INFEC AGEN DETEC AMPLI PROBE: HCPCS

## 2021-06-28 RX ORDER — CHLORTHALIDONE 25 MG/1
25 TABLET ORAL
Status: COMPLETED | OUTPATIENT
Start: 2021-06-28 | End: 2021-06-28

## 2021-06-28 RX ORDER — ACETAMINOPHEN 325 MG/1
650 TABLET ORAL ONCE
Status: COMPLETED | OUTPATIENT
Start: 2021-06-28 | End: 2021-06-28

## 2021-06-28 RX ADMIN — CHLORTHALIDONE 25 MG: 25 TABLET ORAL at 21:08

## 2021-06-28 RX ADMIN — ACETAMINOPHEN 650 MG: 325 TABLET ORAL at 21:08

## 2021-06-28 NOTE — LETTER
7/1/2021      Javed Samples  [address] [facility] [address]      Dear MsClair Ann Park were recently seen in the Emergency Department of [facility] and had lab work performed. We would like to discuss these results with you. Please call the Emergency Department at your earliest convenience at [phone] between 10am-8pm to speak with one of our providers. Your COVID-19 swab from Monday, June 28, 2021 was POSITIVE. There is nothing you need to do at this time. Please continue to quarantine. If you must go out, please continue to practice social distancing and wear a mask. Please return to the emergency department for worsening shortness of breath, vomiting and unable to tolerate liquids or any other concerns.     Sincerely,        AJ Merino      [facility] EMERGENCY DEPT  [address] [place] [address] [address] [id number] [phone] Option #3

## 2021-06-28 NOTE — Clinical Note
[facility] EMERGENCY DEPT  [address] [facility] [address]  [phone]    Work/School Note    Date: 6/28/2021     To Whom It May concern:    Juan Gunter was evaulated by the following provider(s):  Attending Provider: Laura Calixto MD  Physician Assistant: Reanna Franco, [address] [place] [address] virus is suspected. Per the CDC guidelines we recommend home isolation until the following conditions are all met:    1. At least 10 days have passed since symptoms first appeared and  2. At least 24 hours have passed since last fever without the use of fever-reducing medications and  3.  Symptoms (e.g., cough, shortness of breath) have improved    Sincerely,          Padilla Luna

## 2021-06-29 ENCOUNTER — PATIENT OUTREACH (OUTPATIENT)
Dept: CASE MANAGEMENT | Age: 36
End: 2021-06-29

## 2021-06-29 LAB
ATRIAL RATE: 86 BPM
CALCULATED P AXIS, ECG09: 58 DEGREES
CALCULATED R AXIS, ECG10: 84 DEGREES
CALCULATED T AXIS, ECG11: 57 DEGREES
DIAGNOSIS, 93000: NORMAL
P-R INTERVAL, ECG05: 186 MS
Q-T INTERVAL, ECG07: 372 MS
QRS DURATION, ECG06: 86 MS
QTC CALCULATION (BEZET), ECG08: 445 MS
SARS-COV-2, XPLCVT: DETECTED
SOURCE, COVRS: ABNORMAL
VENTRICULAR RATE, ECG03: 86 BPM

## 2021-06-29 NOTE — ED PROVIDER NOTES
EMERGENCY DEPARTMENT HISTORY AND PHYSICAL EXAM      Date: 6/28/2021  Patient Name: Araceli Crisostomo    History of Presenting Illness     Chief Complaint   Patient presents with    Cough     Pt having a cough, headache, issues with her smell since last friday. Pt also complaining of pain with breathing on her right side. Pt in no apparent distress in triage.  Shortness of Breath       History Provided By: Patient    HPI: Araceli Crisostomo, 37 y.o. female with PMHx of HTN, recurrent bronchitis presents BIB self to the ED with cc of cough and shortness of breath since Friday. Patient complains of nonproductive cough. During coughing episodes patient feels short of breath and complains of generalized burning chest pain. Patient decided to come get checked out today because she realized that she had lost her sense of smell. Patient is not Covid vaccinated. She reports her 9year-old daughter has had a cold with nasal congestion and frequent sneezing recently. Patient denies shortness of breath or chest pain at this time. She admits poor appetite, denies known fever, chills, sore throat, sputum production, hemoptysis, exertional chest pain or pressure, vomiting, diarrhea, abdominal pain, rashes. 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 There are no other complaints, changes, or physical findings at this time. PCP: Enmanuel Ruiz MD    No current facility-administered medications on file prior to encounter. Current Outpatient Medications on File Prior to Encounter   Medication Sig Dispense Refill    chlorthalidone (HYGROTON) 25 mg tablet TAKE 1 TABLET BY MOUTH DAILY 60 Tablet 6    chlorthalidone (HYGROTON) 25 mg tablet TAKE 1 TABLET BY MOUTH DAILY 60 Tablet 6    potassium chloride (K-DUR, KLOR-CON) 20 mEq tablet Take 1 Tablet by mouth daily.  30 Tablet 5    albuterol sulfate (PROAIR RESPICLICK) 90 mcg/actuation breath activated inhaler Take 4-6 Puffs by inhalation every four (4) hours as needed for Wheezing. 1 Inhaler 0    ferrous sulfate (IRON) 325 mg (65 mg iron) tablet Take  by mouth Daily (before breakfast).  etonogestrel (NEXPLANON) 68 mg impl by SubDERmal route. Birth control- placed 2019-good for 3 years         Past History     Past Medical History:  Past Medical History:   Diagnosis Date    Cigarette smoker     CERVANTES (dyspnea on exertion) 2011    Edema of lower extremity 2011    Hypertension     IGT (impaired glucose tolerance) 2019    Morbid obesity with BMI of 40.0-44.9, adult (HCC)     Respiratory abnormalities     bronchitis    Thyroid nodule 2019    Consistent with a benign follicular nodule (includes adenomatoid nodule       Past Surgical History:  No past surgical history on file. Family History:  No family history on file. Social History:  Social History     Tobacco Use    Smoking status: Former Smoker     Packs/day: 0.25     Quit date: 2019     Years since quittin.0    Smokeless tobacco: Never Used   Vaping Use    Vaping Use: Never used   Substance Use Topics    Alcohol use: Yes     Comment: occasionally    Drug use: No       Allergies:  No Known Allergies      Review of Systems   Review of Systems   Constitutional: Positive for appetite change. Negative for diaphoresis and fever. HENT: Positive for congestion. Negative for sore throat. Loss of smell   Eyes: Negative for visual disturbance. Respiratory: Positive for cough and shortness of breath. Cardiovascular: Positive for chest pain (With coughing episodes). Gastrointestinal: Negative for vomiting. Endocrine: Negative for polydipsia. Genitourinary: Negative for difficulty urinating. Musculoskeletal: Negative for gait problem. Skin: Negative for rash. Allergic/Immunologic: Negative for immunocompromised state. Neurological: Negative for dizziness. Hematological: Does not bruise/bleed easily.    Psychiatric/Behavioral: Negative for agitation. Physical Exam   Physical Exam  Vitals and nursing note reviewed. Constitutional:       General: She is not in acute distress. Appearance: Normal appearance. She is well-developed. She is obese. She is not toxic-appearing. HENT:      Head: Normocephalic and atraumatic. Nose: Nose normal.      Mouth/Throat:      Mouth: Mucous membranes are moist.   Eyes:      General: Lids are normal.      Extraocular Movements: Extraocular movements intact. Conjunctiva/sclera: Conjunctivae normal.      Pupils: Pupils are equal, round, and reactive to light. Cardiovascular:      Rate and Rhythm: Normal rate and regular rhythm. Heart sounds: No murmur heard. Pulmonary:      Effort: Pulmonary effort is normal. No respiratory distress. Breath sounds: Normal breath sounds. No stridor. No wheezing, rhonchi or rales. Comments: Wheezy cough appreciated  Abdominal:      Palpations: Abdomen is soft. Tenderness: There is no abdominal tenderness. There is no guarding. Musculoskeletal:         General: Normal range of motion. Cervical back: Normal range of motion and neck supple. Comments: No leg edema or tenderness bilaterally   Skin:     General: Skin is warm and dry. Neurological:      General: No focal deficit present. Mental Status: She is alert and oriented to person, place, and time. Psychiatric:         Mood and Affect: Mood normal.         Behavior: Behavior normal. Behavior is cooperative.          Diagnostic Study Results     Labs -     Recent Results (from the past 12 hour(s))   EKG, 12 LEAD, INITIAL    Collection Time: 06/28/21  8:05 PM   Result Value Ref Range    Ventricular Rate 86 BPM    Atrial Rate 86 BPM    P-R Interval 186 ms    QRS Duration 86 ms    Q-T Interval 372 ms    QTC Calculation (Bezet) 445 ms    Calculated P Axis 58 degrees    Calculated R Axis 84 degrees    Calculated T Axis 57 degrees    Diagnosis       Normal sinus rhythm  Right atrial enlargement  When compared with ECG of 23-OCT-2020 01:43,  No significant change was found     SARS-COV-2    Collection Time: 06/28/21  9:09 PM   Result Value Ref Range    SARS-CoV-2 Please find results under separate order         Radiologic Studies -   XR CHEST PORT   Final Result      Suspect diffuse bilateral airspace disease. Mild vascular congestion suspected. CT Results  (Last 48 hours)    None        CXR Results  (Last 48 hours)               06/28/21 2033  XR CHEST PORT Final result    Impression:      Suspect diffuse bilateral airspace disease. Mild vascular congestion suspected. Narrative:  EXAM:  XR CHEST PORT       INDICATION:  pain with inspiration on right side       COMPARISON:  10/23/2020       FINDINGS:       A portable AP radiograph of the chest was obtained at 20:15 hours. The patient   is on a cardiac monitor. There is a hazy appearance of the lungs bilaterally   and bilateral airspace disease is suspected. The descending aorta appears   obscured. There is no pneumothorax. There is borderline enlargement of the   cardiac silhouette  pulmonary vessels may be distended. The study is suboptimal   because of the patient's body habitus. Medical Decision Making   I am the first provider for this patient. I reviewed the vital signs, available nursing notes, past medical history, past surgical history, family history and social history. Vital Signs-Reviewed the patient's vital signs. Patient Vitals for the past 12 hrs:   Temp Pulse Resp BP SpO2   06/28/21 1955 99.3 °F (37.4 °C) 86 17 (!) 153/111 100 %       Records Reviewed: Nursing Notes    Provider Notes (Medical Decision Making):   Patient appears ill but in no acute distress. Nontoxic. Vital signs are stable. She is satting at high percent room air. Wheezing, dry cough noted, however lungs are CTAB. Low suspicion for PE, ACS.   Discussed suspected COVID-19 infection given unvaccinated status and loss of smell today. PCR test performed, results pending. Recommended supportive care with rest, oral hydration, OTC medications, and albuterol MDI. Encourage patient to obtain a pulse oximeter for further monitoring of oxygen levels if desired. Quarantine per [employer] guidelines. ED return precautions reviewed. ED Course:   Initial assessment performed. The patients presenting problems have been discussed, and they are in agreement with the care plan formulated and outlined with them. I have encouraged them to ask questions as they arise throughout their visit. Critical Care Time: None    Disposition:  D/c    PLAN:  1. Discharge Medication List as of 6/28/2021  8:49 PM      START taking these medications    Details   !! albuterol sulfate (PROAIR RESPICLICK) 90 mcg/actuation breath activated inhaler Take 1 Puff by inhalation every four (4) hours as needed for Wheezing., Normal, Disp-1 Inhaler, R-0       !! - Potential duplicate medications found. Please discuss with provider. CONTINUE these medications which have NOT CHANGED    Details   !! chlorthalidone (HYGROTON) 25 mg tablet TAKE 1 TABLET BY MOUTH DAILY, Normal, Disp-60 Tablet, R-6      !! chlorthalidone (HYGROTON) 25 mg tablet TAKE 1 TABLET BY MOUTH DAILY, Normal, Disp-60 Tablet, R-6      potassium chloride (K-DUR, KLOR-CON) 20 mEq tablet Take 1 Tablet by mouth daily. , Normal, Disp-30 Tablet, R-5      !! albuterol sulfate (PROAIR RESPICLICK) 90 mcg/actuation breath activated inhaler Take 4-6 Puffs by inhalation every four (4) hours as needed for Wheezing., Print, Disp-1 Inhaler,R-0      ferrous sulfate (IRON) 325 mg (65 mg iron) tablet Take  by mouth Daily (before breakfast). , Historical Med      etonogestrel (NEXPLANON) 68 mg impl by SubDERmal route. Birth control- placed feb 2019-good for 3 years, Historical Med       !! - Potential duplicate medications found. Please discuss with provider.         2.   Follow-up Information     Follow up With Specialties Details Why Contact Info    [facility] EMERGENCY DEPT Emergency Medicine  As needed, If symptoms worsen [address] [facility] [address]    Venecia Hensley MD Internal Medicine Call  For follow up [address]  [phone]          Return to ED if worse     Diagnosis     Clinical Impression:   1. Suspected COVID-19 virus infection    2. Encounter for laboratory testing for COVID-19 virus    3. Upper respiratory tract infection, unspecified type    4. Hypertension, unspecified type          Please note that this dictation was completed with [employer], the computer voice recognition software. Quite often unanticipated grammatical, syntax, homophones, and other interpretive errors are inadvertently transcribed by the computer software. Please disregards these errors. Please excuse any errors that have escaped final proofreading.

## 2021-06-29 NOTE — PROGRESS NOTES
Patient contacted regarding COVID-19 risk. Discussed COVID-19 related testing which was available at this time. Test results were pending. Patient informed of results, if available? no.     LPN Care Coordinator contacted the patient by telephone to perform post discharge assessment. Call within 2 business days of discharge: Yes Verified name and  with patient as identifiers. Provided introduction to self, and explanation of the CTN/ACM role, and reason for call due to risk factors for infection and/or exposure to COVID-19. Symptoms reviewed with patient who verbalized the following symptoms: no worsening symptoms      Due to no new or worsening symptoms encounter was not routed to provider for escalation. Discussed follow-up appointments. If no appointment was previously scheduled, appointment scheduling offered:  no. [address] follow up appointment(s):   Future Appointments   Date Time Provider Luan Mitchell   10/4/2021 10:45 AM Yunier Correa MD [facility] MAIN [facility]     Non-[facility] follow up appointment(s): n/a    Interventions to address risk factors: Obtained and reviewed discharge summary and/or continuity of care documents     Advance Care Planning:   Does patient have an Advance Directive: not on file; education provided. Educated patient about risk for severe COVID-19 due to risk factors according to CDC guidelines. LPN CC reviewed discharge instructions, medical action plan and red flag symptoms with the patient who verbalized understanding. Discussed COVID vaccination status: yes. Education provided on COVID-19 vaccination as appropriate. Discussed exposure protocols and quarantine with CDC Guidelines. Patient was given an opportunity to verbalize any questions and concerns and agrees to contact LPN CC or health care provider for questions related to their healthcare.     Reviewed and educated patient on any new and changed medications related to discharge diagnosis     Was patient discharged with a pulse oximeter? no Discussed and confirmed pulse oximeter discharge instructions and when to notify provider or seek emergency care. LPN CC provided contact information. Plan for follow-up call in 14 days based on severity of symptoms and risk factors.

## 2021-06-29 NOTE — DISCHARGE INSTRUCTIONS

## 2021-06-29 NOTE — ED NOTES
Pt presents to ed due to cough and pain upon inspiration that began on Friday. Pt also notes some intermittent issues with her sense of smell stating that \"sometimes I can smell things and sometimes not\". Pt reports that her daughter recently had a cold. Pt denies nv,d, change in bm, urinary sx.     8:34 PM: pt reports she has not taken her bp meds today. 9:05 PM: I have reviewed discharge instructions with the patient. The patient verbalized understanding.

## 2021-07-12 ENCOUNTER — PATIENT OUTREACH (OUTPATIENT)
Dept: CASE MANAGEMENT | Age: 36
End: 2021-07-12

## 2021-07-12 NOTE — PROGRESS NOTES
Patient resolved from [address] Transitions episode on 07/12/21. Discussed COVID-19 related testing which was available at this time. Test results were negative. Patient informed of results, if available? no     Patient/family has been provided the following resources and education related to COVID-19:                         Signs, symptoms and red flags related to COVID-19            [facility] exposure and quarantine guidelines            [facility] exposure contact - [phone]            Contact for their local Department of Health                 Patient currently reports that the following symptoms have improved:  no worsening symptoms. No further outreach scheduled with this CTN/ACM/LPN/HC/ MA. Episode of Care resolved. Patient has this CTN/ACM/LPN/HC/MA contact information if future needs arise.

## 2021-07-21 ENCOUNTER — TRANSCRIBE ORDER (OUTPATIENT)
Dept: SCHEDULING | Age: 36
End: 2021-07-21

## 2021-07-21 DIAGNOSIS — R22.1 NECK MASS: Primary | ICD-10-CM

## 2021-07-22 ENCOUNTER — HOSPITAL ENCOUNTER (OUTPATIENT)
Dept: PREADMISSION TESTING | Age: 36
Discharge: HOME | End: 2021-07-22
Payer: COMMERCIAL

## 2021-07-22 VITALS
TEMPERATURE: 98.3 F | HEART RATE: 89 BPM | DIASTOLIC BLOOD PRESSURE: 87 MMHG | BODY MASS INDEX: 43.9 KG/M2 | WEIGHT: 273.15 LBS | SYSTOLIC BLOOD PRESSURE: 134 MMHG | HEIGHT: 66 IN

## 2021-07-22 LAB
ALBUMIN SERPL-MCNC: 3.5 G/DL (ref 3.5–5)
ALBUMIN/GLOB SERPL: 0.9 {RATIO} (ref 1.1–2.2)
ALP SERPL-CCNC: 63 U/L (ref 45–117)
ALT SERPL-CCNC: 34 U/L (ref 12–78)
ANION GAP SERPL CALC-SCNC: 4 MMOL/L (ref 5–15)
AST SERPL-CCNC: 17 U/L (ref 15–37)
ATRIAL RATE: 71 BPM
BASOPHILS # BLD: 0 K/UL (ref 0–0.1)
BASOPHILS NFR BLD: 0 % (ref 0–1)
BILIRUB SERPL-MCNC: 0.3 MG/DL (ref 0.2–1)
BUN SERPL-MCNC: 11 MG/DL (ref 6–20)
BUN/CREAT SERPL: 18 (ref 12–20)
CALCIUM SERPL-MCNC: 9.1 MG/DL (ref 8.5–10.1)
CALCULATED P AXIS, ECG09: 46 DEGREES
CALCULATED R AXIS, ECG10: 75 DEGREES
CALCULATED T AXIS, ECG11: 29 DEGREES
CHLORIDE SERPL-SCNC: 103 MMOL/L (ref 97–108)
CO2 SERPL-SCNC: 32 MMOL/L (ref 21–32)
CREAT SERPL-MCNC: 0.62 MG/DL (ref 0.55–1.02)
DIAGNOSIS, 93000: NORMAL
DIFFERENTIAL METHOD BLD: ABNORMAL
EOSINOPHIL # BLD: 0.1 K/UL (ref 0–0.4)
EOSINOPHIL NFR BLD: 1 % (ref 0–7)
ERYTHROCYTE [DISTWIDTH] IN BLOOD BY AUTOMATED COUNT: 13.3 % (ref 11.5–14.5)
GLOBULIN SER CALC-MCNC: 3.7 G/DL (ref 2–4)
GLUCOSE SERPL-MCNC: 129 MG/DL (ref 65–100)
HCT VFR BLD AUTO: 34.4 % (ref 35–47)
HGB BLD-MCNC: 11.1 G/DL (ref 11.5–16)
IMM GRANULOCYTES # BLD AUTO: 0 K/UL (ref 0–0.04)
IMM GRANULOCYTES NFR BLD AUTO: 0 % (ref 0–0.5)
LYMPHOCYTES # BLD: 1.9 K/UL (ref 0.8–3.5)
LYMPHOCYTES NFR BLD: 35 % (ref 12–49)
MCH RBC QN AUTO: 28.8 PG (ref 26–34)
MCHC RBC AUTO-ENTMCNC: 32.3 G/DL (ref 30–36.5)
MCV RBC AUTO: 89.1 FL (ref 80–99)
MONOCYTES # BLD: 0.4 K/UL (ref 0–1)
MONOCYTES NFR BLD: 7 % (ref 5–13)
NEUTS SEG # BLD: 3.1 K/UL (ref 1.8–8)
NEUTS SEG NFR BLD: 57 % (ref 32–75)
NRBC # BLD: 0 K/UL (ref 0–0.01)
NRBC BLD-RTO: 0 PER 100 WBC
P-R INTERVAL, ECG05: 224 MS
PLATELET # BLD AUTO: 241 K/UL (ref 150–400)
PMV BLD AUTO: 10.1 FL (ref 8.9–12.9)
POTASSIUM SERPL-SCNC: 3.2 MMOL/L (ref 3.5–5.1)
PROT SERPL-MCNC: 7.2 G/DL (ref 6.4–8.2)
Q-T INTERVAL, ECG07: 404 MS
QRS DURATION, ECG06: 86 MS
QTC CALCULATION (BEZET), ECG08: 439 MS
RBC # BLD AUTO: 3.86 M/UL (ref 3.8–5.2)
SODIUM SERPL-SCNC: 139 MMOL/L (ref 136–145)
TSH SERPL DL<=0.05 MIU/L-ACNC: 0.02 UIU/ML (ref 0.36–3.74)
VENTRICULAR RATE, ECG03: 71 BPM
WBC # BLD AUTO: 5.4 K/UL (ref 3.6–11)

## 2021-07-22 PROCEDURE — 93005 ELECTROCARDIOGRAM TRACING: CPT

## 2021-07-22 PROCEDURE — 80053 COMPREHEN METABOLIC PANEL: CPT

## 2021-07-22 PROCEDURE — 85025 COMPLETE CBC W/AUTO DIFF WBC: CPT

## 2021-07-22 PROCEDURE — 84443 ASSAY THYROID STIM HORMONE: CPT

## 2021-07-22 PROCEDURE — 36415 COLL VENOUS BLD VENIPUNCTURE: CPT

## 2021-07-22 RX ORDER — CHOLECALCIFEROL (VITAMIN D3) 50 MCG
CAPSULE ORAL DAILY
COMMUNITY
End: 2022-04-01

## 2021-07-23 NOTE — PERIOP NOTES
Spoke with [facility] at Dr Deanne Lin office and reported K= 3.2 and TSH 0.02    Lab results and EKG faxed to Dr. Deanne Lin office.

## 2021-07-26 ENCOUNTER — HOSPITAL ENCOUNTER (OUTPATIENT)
Dept: PREADMISSION TESTING | Age: 36
Discharge: HOME | End: 2021-07-26
Payer: COMMERCIAL

## 2021-07-26 ENCOUNTER — TRANSCRIBE ORDER (OUTPATIENT)
Dept: REGISTRATION | Age: 36
End: 2021-07-26

## 2021-07-26 DIAGNOSIS — Z01.812 PRE-PROCEDURE LAB EXAM: ICD-10-CM

## 2021-07-26 DIAGNOSIS — Z01.812 PRE-PROCEDURE LAB EXAM: Primary | ICD-10-CM

## 2021-07-26 PROCEDURE — U0005 INFEC AGEN DETEC AMPLI PROBE: HCPCS

## 2021-07-27 ENCOUNTER — HOSPITAL ENCOUNTER (OUTPATIENT)
Dept: CT IMAGING | Age: 36
Discharge: HOME | End: 2021-07-27
Attending: OTOLARYNGOLOGY
Payer: COMMERCIAL

## 2021-07-27 DIAGNOSIS — R22.1 NECK MASS: ICD-10-CM

## 2021-07-27 LAB
SARS-COV-2, XPLCVT: NOT DETECTED
SOURCE, COVRS: NORMAL

## 2021-07-27 PROCEDURE — 70491 CT SOFT TISSUE NECK W/DYE: CPT

## 2021-07-27 PROCEDURE — 74011000636 HC RX REV CODE- 636: Performed by: OTOLARYNGOLOGY

## 2021-07-27 RX ADMIN — IOPAMIDOL 100 ML: 755 INJECTION, SOLUTION INTRAVENOUS at 18:38

## 2021-07-29 ENCOUNTER — ANESTHESIA EVENT (OUTPATIENT)
Dept: MEDSURG UNIT | Age: 36
End: 2021-07-29
Payer: COMMERCIAL

## 2021-07-30 ENCOUNTER — ANESTHESIA (OUTPATIENT)
Dept: MEDSURG UNIT | Age: 36
End: 2021-07-30
Payer: COMMERCIAL

## 2021-07-30 ENCOUNTER — HOSPITAL ENCOUNTER (OUTPATIENT)
Age: 36
Setting detail: OBSERVATION
Discharge: HOME | End: 2021-07-31
Attending: OTOLARYNGOLOGY | Admitting: OTOLARYNGOLOGY
Payer: COMMERCIAL

## 2021-07-30 DIAGNOSIS — E04.2 MULTINODULAR GOITER: Primary | ICD-10-CM

## 2021-07-30 LAB
CALCIUM SERPL-MCNC: 9.4 MG/DL (ref 8.5–10.1)
HCG UR QL: NEGATIVE
MAGNESIUM SERPL-MCNC: 1.8 MG/DL (ref 1.6–2.4)
PHOSPHATE SERPL-MCNC: 2 MG/DL (ref 2.6–4.7)

## 2021-07-30 PROCEDURE — 77030040361 HC SLV COMPR DVT MDII -B: Performed by: OTOLARYNGOLOGY

## 2021-07-30 PROCEDURE — 76030000003 HC AMB SURG OR TIME 1.5 TO 2: Performed by: OTOLARYNGOLOGY

## 2021-07-30 PROCEDURE — 77030020268 HC MISC GENERAL SUPPLY: Performed by: OTOLARYNGOLOGY

## 2021-07-30 PROCEDURE — 77030026438 HC STYL ET INTUB CARD -A: Performed by: ANESTHESIOLOGY

## 2021-07-30 PROCEDURE — 84100 ASSAY OF PHOSPHORUS: CPT

## 2021-07-30 PROCEDURE — 74011250637 HC RX REV CODE- 250/637: Performed by: OTOLARYNGOLOGY

## 2021-07-30 PROCEDURE — 77030019655 HC PRB STIM CRAN MEDT -B: Performed by: OTOLARYNGOLOGY

## 2021-07-30 PROCEDURE — 77030014008 HC SPNG HEMSTAT J&J -C: Performed by: OTOLARYNGOLOGY

## 2021-07-30 PROCEDURE — 74011250636 HC RX REV CODE- 250/636: Performed by: NURSE ANESTHETIST, CERTIFIED REGISTERED

## 2021-07-30 PROCEDURE — 77030040922 HC BLNKT HYPOTHRM STRY -A

## 2021-07-30 PROCEDURE — 81025 URINE PREGNANCY TEST: CPT

## 2021-07-30 PROCEDURE — 83735 ASSAY OF MAGNESIUM: CPT

## 2021-07-30 PROCEDURE — 77030008698 HC TU ET REINF MEDT -D: Performed by: OTOLARYNGOLOGY

## 2021-07-30 PROCEDURE — 77030010512 HC APPL CLP LIG J&J -C: Performed by: OTOLARYNGOLOGY

## 2021-07-30 PROCEDURE — 88307 TISSUE EXAM BY PATHOLOGIST: CPT

## 2021-07-30 PROCEDURE — 77030039147 HC PWDR HEMSTS SURGICEL JNJ -D: Performed by: OTOLARYNGOLOGY

## 2021-07-30 PROCEDURE — 74011250636 HC RX REV CODE- 250/636: Performed by: ANESTHESIOLOGY

## 2021-07-30 PROCEDURE — 77030031753 HC SHR ENDO COAG HARM J&J -E: Performed by: OTOLARYNGOLOGY

## 2021-07-30 PROCEDURE — 77030031139 HC SUT VCRL2 J&J -A: Performed by: OTOLARYNGOLOGY

## 2021-07-30 PROCEDURE — 74011000250 HC RX REV CODE- 250: Performed by: OTOLARYNGOLOGY

## 2021-07-30 PROCEDURE — 77030002933 HC SUT MCRYL J&J -A: Performed by: OTOLARYNGOLOGY

## 2021-07-30 PROCEDURE — 99218 HC RM OBSERVATION: CPT

## 2021-07-30 PROCEDURE — 74011250637 HC RX REV CODE- 250/637: Performed by: ANESTHESIOLOGY

## 2021-07-30 PROCEDURE — 76210000038 HC AMBSU PH I REC 2.5 TO 3 HR: Performed by: OTOLARYNGOLOGY

## 2021-07-30 PROCEDURE — 77030002996 HC SUT SLK J&J -A: Performed by: OTOLARYNGOLOGY

## 2021-07-30 PROCEDURE — 82310 ASSAY OF CALCIUM: CPT

## 2021-07-30 PROCEDURE — 74011000258 HC RX REV CODE- 258: Performed by: OTOLARYNGOLOGY

## 2021-07-30 PROCEDURE — 76060000063 HC AMB SURG ANES 1.5 TO 2 HR: Performed by: OTOLARYNGOLOGY

## 2021-07-30 PROCEDURE — 74011250636 HC RX REV CODE- 250/636: Performed by: OTOLARYNGOLOGY

## 2021-07-30 PROCEDURE — 36415 COLL VENOUS BLD VENIPUNCTURE: CPT

## 2021-07-30 PROCEDURE — 2709999900 HC NON-CHARGEABLE SUPPLY: Performed by: OTOLARYNGOLOGY

## 2021-07-30 PROCEDURE — 77030008698 HC TU ET REINF MEDT -D: Performed by: ANESTHESIOLOGY

## 2021-07-30 PROCEDURE — 77030040356 HC CORD BPLR FRCP COVD -A: Performed by: OTOLARYNGOLOGY

## 2021-07-30 PROCEDURE — 77030021052 HC RNG RETRCTR STAY COOP -A: Performed by: OTOLARYNGOLOGY

## 2021-07-30 PROCEDURE — 74011000250 HC RX REV CODE- 250: Performed by: NURSE ANESTHETIST, CERTIFIED REGISTERED

## 2021-07-30 RX ORDER — FENTANYL CITRATE 50 UG/ML
50 INJECTION, SOLUTION INTRAMUSCULAR; INTRAVENOUS AS NEEDED
Status: DISCONTINUED | OUTPATIENT
Start: 2021-07-30 | End: 2021-07-30 | Stop reason: HOSPADM

## 2021-07-30 RX ORDER — PROPOFOL 10 MG/ML
INJECTION, EMULSION INTRAVENOUS
Status: DISCONTINUED | OUTPATIENT
Start: 2021-07-30 | End: 2021-07-30 | Stop reason: HOSPADM

## 2021-07-30 RX ORDER — ACETAMINOPHEN 325 MG/1
650 TABLET ORAL ONCE
Status: COMPLETED | OUTPATIENT
Start: 2021-07-30 | End: 2021-07-30

## 2021-07-30 RX ORDER — POTASSIUM CHLORIDE 750 MG/1
20 TABLET, FILM COATED, EXTENDED RELEASE ORAL DAILY
Status: DISCONTINUED | OUTPATIENT
Start: 2021-07-31 | End: 2021-07-31 | Stop reason: HOSPADM

## 2021-07-30 RX ORDER — OXYCODONE AND ACETAMINOPHEN 10; 325 MG/1; MG/1
1 TABLET ORAL
Status: DISCONTINUED | OUTPATIENT
Start: 2021-07-30 | End: 2021-07-31 | Stop reason: HOSPADM

## 2021-07-30 RX ORDER — FERROUS SULFATE, DRIED 160(50) MG
1 TABLET, EXTENDED RELEASE ORAL EVERY 8 HOURS
Status: DISCONTINUED | OUTPATIENT
Start: 2021-07-30 | End: 2021-07-31 | Stop reason: HOSPADM

## 2021-07-30 RX ORDER — MIDAZOLAM HYDROCHLORIDE 1 MG/ML
INJECTION, SOLUTION INTRAMUSCULAR; INTRAVENOUS AS NEEDED
Status: DISCONTINUED | OUTPATIENT
Start: 2021-07-30 | End: 2021-07-30 | Stop reason: HOSPADM

## 2021-07-30 RX ORDER — DIPHENHYDRAMINE HYDROCHLORIDE 50 MG/ML
12.5 INJECTION, SOLUTION INTRAMUSCULAR; INTRAVENOUS AS NEEDED
Status: DISCONTINUED | OUTPATIENT
Start: 2021-07-30 | End: 2021-07-30 | Stop reason: HOSPADM

## 2021-07-30 RX ORDER — SODIUM CHLORIDE 9 MG/ML
25 INJECTION, SOLUTION INTRAVENOUS CONTINUOUS
Status: DISCONTINUED | OUTPATIENT
Start: 2021-07-30 | End: 2021-07-30 | Stop reason: HOSPADM

## 2021-07-30 RX ORDER — ROCURONIUM BROMIDE 10 MG/ML
INJECTION, SOLUTION INTRAVENOUS AS NEEDED
Status: DISCONTINUED | OUTPATIENT
Start: 2021-07-30 | End: 2021-07-30 | Stop reason: HOSPADM

## 2021-07-30 RX ORDER — FENTANYL CITRATE 50 UG/ML
25 INJECTION, SOLUTION INTRAMUSCULAR; INTRAVENOUS
Status: COMPLETED | OUTPATIENT
Start: 2021-07-30 | End: 2021-07-30

## 2021-07-30 RX ORDER — DEXAMETHASONE SODIUM PHOSPHATE 4 MG/ML
INJECTION, SOLUTION INTRA-ARTICULAR; INTRALESIONAL; INTRAMUSCULAR; INTRAVENOUS; SOFT TISSUE AS NEEDED
Status: DISCONTINUED | OUTPATIENT
Start: 2021-07-30 | End: 2021-07-30 | Stop reason: HOSPADM

## 2021-07-30 RX ORDER — ONDANSETRON 2 MG/ML
4 INJECTION INTRAMUSCULAR; INTRAVENOUS AS NEEDED
Status: DISCONTINUED | OUTPATIENT
Start: 2021-07-30 | End: 2021-07-30 | Stop reason: HOSPADM

## 2021-07-30 RX ORDER — LIDOCAINE HYDROCHLORIDE AND EPINEPHRINE 10; 10 MG/ML; UG/ML
1.5 INJECTION, SOLUTION INFILTRATION; PERINEURAL ONCE
Status: COMPLETED | OUTPATIENT
Start: 2021-07-30 | End: 2021-07-30

## 2021-07-30 RX ORDER — HYDROCODONE BITARTRATE AND ACETAMINOPHEN 5; 325 MG/1; MG/1
1 TABLET ORAL AS NEEDED
Status: DISCONTINUED | OUTPATIENT
Start: 2021-07-30 | End: 2021-07-30 | Stop reason: HOSPADM

## 2021-07-30 RX ORDER — CHLORTHALIDONE 25 MG/1
25 TABLET ORAL DAILY
Status: DISCONTINUED | OUTPATIENT
Start: 2021-07-31 | End: 2021-07-31 | Stop reason: HOSPADM

## 2021-07-30 RX ORDER — FENTANYL CITRATE 50 UG/ML
INJECTION, SOLUTION INTRAMUSCULAR; INTRAVENOUS AS NEEDED
Status: DISCONTINUED | OUTPATIENT
Start: 2021-07-30 | End: 2021-07-30 | Stop reason: HOSPADM

## 2021-07-30 RX ORDER — ONDANSETRON 2 MG/ML
INJECTION INTRAMUSCULAR; INTRAVENOUS AS NEEDED
Status: DISCONTINUED | OUTPATIENT
Start: 2021-07-30 | End: 2021-07-30 | Stop reason: HOSPADM

## 2021-07-30 RX ORDER — LEVOTHYROXINE SODIUM 150 UG/1
150 TABLET ORAL DAILY
Status: DISCONTINUED | OUTPATIENT
Start: 2021-07-31 | End: 2021-07-31 | Stop reason: HOSPADM

## 2021-07-30 RX ORDER — HYDRALAZINE HYDROCHLORIDE 20 MG/ML
10 INJECTION INTRAMUSCULAR; INTRAVENOUS
Status: DISCONTINUED | OUTPATIENT
Start: 2021-07-30 | End: 2021-07-30 | Stop reason: HOSPADM

## 2021-07-30 RX ORDER — MORPHINE SULFATE 2 MG/ML
2 INJECTION, SOLUTION INTRAMUSCULAR; INTRAVENOUS
Status: DISCONTINUED | OUTPATIENT
Start: 2021-07-30 | End: 2021-07-30 | Stop reason: HOSPADM

## 2021-07-30 RX ORDER — ONDANSETRON 2 MG/ML
4 INJECTION INTRAMUSCULAR; INTRAVENOUS
Status: DISCONTINUED | OUTPATIENT
Start: 2021-07-30 | End: 2021-07-31 | Stop reason: HOSPADM

## 2021-07-30 RX ORDER — MIDAZOLAM HYDROCHLORIDE 1 MG/ML
1 INJECTION, SOLUTION INTRAMUSCULAR; INTRAVENOUS AS NEEDED
Status: DISCONTINUED | OUTPATIENT
Start: 2021-07-30 | End: 2021-07-30 | Stop reason: HOSPADM

## 2021-07-30 RX ORDER — IPRATROPIUM BROMIDE AND ALBUTEROL SULFATE 2.5; .5 MG/3ML; MG/3ML
3 SOLUTION RESPIRATORY (INHALATION)
Status: DISCONTINUED | OUTPATIENT
Start: 2021-07-30 | End: 2021-07-31 | Stop reason: HOSPADM

## 2021-07-30 RX ORDER — DEXMEDETOMIDINE HYDROCHLORIDE 100 UG/ML
INJECTION, SOLUTION INTRAVENOUS AS NEEDED
Status: DISCONTINUED | OUTPATIENT
Start: 2021-07-30 | End: 2021-07-30 | Stop reason: HOSPADM

## 2021-07-30 RX ORDER — LIDOCAINE HYDROCHLORIDE 20 MG/ML
INJECTION, SOLUTION EPIDURAL; INFILTRATION; INTRACAUDAL; PERINEURAL AS NEEDED
Status: DISCONTINUED | OUTPATIENT
Start: 2021-07-30 | End: 2021-07-30 | Stop reason: HOSPADM

## 2021-07-30 RX ORDER — DEXTROSE, SODIUM CHLORIDE, AND POTASSIUM CHLORIDE 5; .45; .15 G/100ML; G/100ML; G/100ML
25 INJECTION INTRAVENOUS CONTINUOUS
Status: DISCONTINUED | OUTPATIENT
Start: 2021-07-30 | End: 2021-07-31 | Stop reason: HOSPADM

## 2021-07-30 RX ORDER — HYDROMORPHONE HYDROCHLORIDE 1 MG/ML
0.2 INJECTION, SOLUTION INTRAMUSCULAR; INTRAVENOUS; SUBCUTANEOUS
Status: DISCONTINUED | OUTPATIENT
Start: 2021-07-30 | End: 2021-07-30 | Stop reason: HOSPADM

## 2021-07-30 RX ORDER — SUCCINYLCHOLINE CHLORIDE 20 MG/ML
INJECTION INTRAMUSCULAR; INTRAVENOUS AS NEEDED
Status: DISCONTINUED | OUTPATIENT
Start: 2021-07-30 | End: 2021-07-30 | Stop reason: HOSPADM

## 2021-07-30 RX ORDER — LIDOCAINE HYDROCHLORIDE 10 MG/ML
0.1 INJECTION, SOLUTION EPIDURAL; INFILTRATION; INTRACAUDAL; PERINEURAL AS NEEDED
Status: DISCONTINUED | OUTPATIENT
Start: 2021-07-30 | End: 2021-07-30 | Stop reason: HOSPADM

## 2021-07-30 RX ORDER — ROPIVACAINE HYDROCHLORIDE 5 MG/ML
30 INJECTION, SOLUTION EPIDURAL; INFILTRATION; PERINEURAL AS NEEDED
Status: DISCONTINUED | OUTPATIENT
Start: 2021-07-30 | End: 2021-07-30 | Stop reason: HOSPADM

## 2021-07-30 RX ORDER — OXYCODONE AND ACETAMINOPHEN 5; 325 MG/1; MG/1
1 TABLET ORAL
Status: DISCONTINUED | OUTPATIENT
Start: 2021-07-30 | End: 2021-07-31 | Stop reason: HOSPADM

## 2021-07-30 RX ORDER — CALCITRIOL 0.25 UG/1
0.25 CAPSULE ORAL DAILY
Status: DISCONTINUED | OUTPATIENT
Start: 2021-07-31 | End: 2021-07-31 | Stop reason: HOSPADM

## 2021-07-30 RX ORDER — SODIUM CHLORIDE, SODIUM LACTATE, POTASSIUM CHLORIDE, CALCIUM CHLORIDE 600; 310; 30; 20 MG/100ML; MG/100ML; MG/100ML; MG/100ML
1000 INJECTION, SOLUTION INTRAVENOUS CONTINUOUS
Status: DISCONTINUED | OUTPATIENT
Start: 2021-07-30 | End: 2021-07-30 | Stop reason: HOSPADM

## 2021-07-30 RX ORDER — SODIUM CHLORIDE 0.9 % (FLUSH) 0.9 %
5-40 SYRINGE (ML) INJECTION EVERY 8 HOURS
Status: DISCONTINUED | OUTPATIENT
Start: 2021-07-30 | End: 2021-07-31 | Stop reason: HOSPADM

## 2021-07-30 RX ORDER — ALBUTEROL SULFATE 0.83 MG/ML
2.5 SOLUTION RESPIRATORY (INHALATION) AS NEEDED
Status: DISCONTINUED | OUTPATIENT
Start: 2021-07-30 | End: 2021-07-30 | Stop reason: HOSPADM

## 2021-07-30 RX ORDER — PROPOFOL 10 MG/ML
INJECTION, EMULSION INTRAVENOUS AS NEEDED
Status: DISCONTINUED | OUTPATIENT
Start: 2021-07-30 | End: 2021-07-30 | Stop reason: HOSPADM

## 2021-07-30 RX ORDER — ACETAMINOPHEN 325 MG/1
650 TABLET ORAL
Status: DISCONTINUED | OUTPATIENT
Start: 2021-07-30 | End: 2021-07-31 | Stop reason: HOSPADM

## 2021-07-30 RX ORDER — GLYCOPYRROLATE 0.2 MG/ML
0.2 INJECTION INTRAMUSCULAR; INTRAVENOUS
Status: DISCONTINUED | OUTPATIENT
Start: 2021-07-30 | End: 2021-07-30 | Stop reason: HOSPADM

## 2021-07-30 RX ORDER — SODIUM CHLORIDE 0.9 % (FLUSH) 0.9 %
5-40 SYRINGE (ML) INJECTION AS NEEDED
Status: DISCONTINUED | OUTPATIENT
Start: 2021-07-30 | End: 2021-07-31 | Stop reason: HOSPADM

## 2021-07-30 RX ORDER — MIDAZOLAM HYDROCHLORIDE 1 MG/ML
0.5 INJECTION, SOLUTION INTRAMUSCULAR; INTRAVENOUS
Status: DISCONTINUED | OUTPATIENT
Start: 2021-07-30 | End: 2021-07-30 | Stop reason: HOSPADM

## 2021-07-30 RX ORDER — HYDROMORPHONE HYDROCHLORIDE 2 MG/ML
INJECTION, SOLUTION INTRAMUSCULAR; INTRAVENOUS; SUBCUTANEOUS AS NEEDED
Status: DISCONTINUED | OUTPATIENT
Start: 2021-07-30 | End: 2021-07-30 | Stop reason: HOSPADM

## 2021-07-30 RX ADMIN — HYDROMORPHONE HYDROCHLORIDE 0.2 MG: 1 INJECTION, SOLUTION INTRAMUSCULAR; INTRAVENOUS; SUBCUTANEOUS at 11:47

## 2021-07-30 RX ADMIN — HYDROMORPHONE HYDROCHLORIDE 0.5 MG: 2 INJECTION, SOLUTION INTRAMUSCULAR; INTRAVENOUS; SUBCUTANEOUS at 08:35

## 2021-07-30 RX ADMIN — CEFAZOLIN 3 G: 10 INJECTION, POWDER, FOR SOLUTION INTRAVENOUS at 08:24

## 2021-07-30 RX ADMIN — FENTANYL CITRATE 25 MCG: 50 INJECTION, SOLUTION INTRAMUSCULAR; INTRAVENOUS at 10:44

## 2021-07-30 RX ADMIN — DEXMEDETOMIDINE HYDROCHLORIDE 10 MCG: 100 INJECTION, SOLUTION, CONCENTRATE INTRAVENOUS at 08:27

## 2021-07-30 RX ADMIN — HYDROMORPHONE HYDROCHLORIDE 0.2 MG: 1 INJECTION, SOLUTION INTRAMUSCULAR; INTRAVENOUS; SUBCUTANEOUS at 10:55

## 2021-07-30 RX ADMIN — LIDOCAINE HYDROCHLORIDE 60 MG: 20 INJECTION, SOLUTION EPIDURAL; INFILTRATION; INTRACAUDAL; PERINEURAL at 08:13

## 2021-07-30 RX ADMIN — FENTANYL CITRATE 25 MCG: 50 INJECTION, SOLUTION INTRAMUSCULAR; INTRAVENOUS at 10:01

## 2021-07-30 RX ADMIN — FENTANYL CITRATE 25 MCG: 50 INJECTION, SOLUTION INTRAMUSCULAR; INTRAVENOUS at 10:09

## 2021-07-30 RX ADMIN — PROPOFOL 200 MG: 10 INJECTION, EMULSION INTRAVENOUS at 08:13

## 2021-07-30 RX ADMIN — CALCIUM CARBONATE-VITAMIN D TAB 500 MG-200 UNIT 1 TABLET: 500-200 TAB at 21:50

## 2021-07-30 RX ADMIN — PROPOFOL 50 MCG/KG/MIN: 10 INJECTION, EMULSION INTRAVENOUS at 08:13

## 2021-07-30 RX ADMIN — SUCCINYLCHOLINE CHLORIDE 200 MG: 20 INJECTION, SOLUTION INTRAMUSCULAR; INTRAVENOUS at 08:13

## 2021-07-30 RX ADMIN — MIDAZOLAM 2 MG: 1 INJECTION INTRAMUSCULAR; INTRAVENOUS at 08:07

## 2021-07-30 RX ADMIN — ACETAMINOPHEN 650 MG: 325 TABLET ORAL at 07:37

## 2021-07-30 RX ADMIN — FENTANYL CITRATE 100 MCG: 50 INJECTION, SOLUTION INTRAMUSCULAR; INTRAVENOUS at 08:13

## 2021-07-30 RX ADMIN — ROCURONIUM BROMIDE 5 MG: 10 SOLUTION INTRAVENOUS at 08:13

## 2021-07-30 RX ADMIN — FENTANYL CITRATE 25 MCG: 50 INJECTION, SOLUTION INTRAMUSCULAR; INTRAVENOUS at 10:14

## 2021-07-30 RX ADMIN — OXYCODONE HYDROCHLORIDE AND ACETAMINOPHEN 1 TABLET: 5; 325 TABLET ORAL at 15:58

## 2021-07-30 RX ADMIN — HYDROMORPHONE HYDROCHLORIDE 0.2 MG: 1 INJECTION, SOLUTION INTRAMUSCULAR; INTRAVENOUS; SUBCUTANEOUS at 11:24

## 2021-07-30 RX ADMIN — PROPOFOL 50 MG: 10 INJECTION, EMULSION INTRAVENOUS at 08:38

## 2021-07-30 RX ADMIN — DEXMEDETOMIDINE HYDROCHLORIDE 10 MCG: 100 INJECTION, SOLUTION, CONCENTRATE INTRAVENOUS at 08:47

## 2021-07-30 RX ADMIN — OXYCODONE AND ACETAMINOPHEN 1 TABLET: 10; 325 TABLET ORAL at 20:36

## 2021-07-30 RX ADMIN — DEXAMETHASONE SODIUM PHOSPHATE 8 MG: 4 INJECTION, SOLUTION INTRAMUSCULAR; INTRAVENOUS at 08:24

## 2021-07-30 RX ADMIN — CALCIUM CARBONATE-VITAMIN D TAB 500 MG-200 UNIT 1 TABLET: 500-200 TAB at 15:56

## 2021-07-30 RX ADMIN — SODIUM CHLORIDE, POTASSIUM CHLORIDE, SODIUM LACTATE AND CALCIUM CHLORIDE 1000 ML: 600; 310; 30; 20 INJECTION, SOLUTION INTRAVENOUS at 07:37

## 2021-07-30 RX ADMIN — ONDANSETRON HYDROCHLORIDE 4 MG: 2 INJECTION, SOLUTION INTRAMUSCULAR; INTRAVENOUS at 09:11

## 2021-07-30 NOTE — ANESTHESIA PREPROCEDURE EVALUATION
Relevant Problems   RESPIRATORY SYSTEM   (+) Cigarette smoker   (+) CERVANTES (dyspnea on exertion)      CARDIOVASCULAR   (+) Hypertension      ENDOCRINE   (+) Obesity, morbid (HCC)       Anesthetic History   No history of anesthetic complications            Review of Systems / Medical History  Patient summary reviewed, nursing notes reviewed and pertinent labs reviewed    Pulmonary  Within defined limits                 Neuro/Psych   Within defined limits           Cardiovascular    Hypertension: well controlled              Exercise tolerance: >4 METS     GI/Hepatic/Renal                Endo/Other      Hypothyroidism  Morbid obesity     Other Findings              Physical Exam    Airway  Mallampati: I  TM Distance: > 6 cm  Neck ROM: normal range of motion   Mouth opening: Normal     Cardiovascular    Rhythm: regular  Rate: normal         Dental  No notable dental hx       Pulmonary  Breath sounds clear to auscultation               Abdominal         Other Findings            Anesthetic Plan    ASA: 3  Anesthesia type: general          Induction: Intravenous  Anesthetic plan and risks discussed with: Patient

## 2021-07-30 NOTE — ANESTHESIA POSTPROCEDURE EVALUATION
Post-Anesthesia Evaluation and Assessment    Patient: Josefa Emery MRN: [id number]  SSN: [id number]    YOB: 1985  Age: 37 y.o. Sex: female      I have evaluated the patient and they are stable and ready for discharge from the PACU. Cardiovascular Function/Vital Signs  Visit Vitals  BP (!) 136/101   Pulse 68   Temp 36.7 °C (98 °F)   Resp 14   Ht 5' 5.75\" (1.67 m)   Wt 123.8 kg (273 lb)   SpO2 100%   BMI 44.40 kg/m²       Patient is status post General anesthesia for Procedure(s):  TOTAL THYROIDECTOMY, NECK EXPLORATION WITH NIM. Nausea/Vomiting: None    Postoperative hydration reviewed and adequate. Pain:  Pain Scale 1: Numeric (0 - 10) (07/30/21 1056)  Pain Intensity 1: 10 (07/30/21 1056)   Managed    Neurological Status:   Neuro (WDL): Within Defined Limits (07/30/21 0949)  Neuro  LUE Motor Response: Purposeful (07/30/21 0949)  LLE Motor Response: Purposeful (07/30/21 0949)  RUE Motor Response: Purposeful (07/30/21 0949)  RLE Motor Response: Purposeful (07/30/21 0949)   At baseline    Mental Status, Level of Consciousness: Alert and  oriented to person, place, and time    Pulmonary Status:   O2 Device: Nasal cannula (07/30/21 0949)   Adequate oxygenation and airway patent    Complications related to anesthesia: None    Post-anesthesia assessment completed. No concerns    Signed By: Kimberly Martinez MD     July 30, 2021              Procedure(s):  TOTAL THYROIDECTOMY, NECK EXPLORATION WITH NIM. general    <BSHSIANPOST>    INITIAL Post-op Vital signs:   Vitals Value Taken Time   /102 07/30/21 1130   Temp 36.7 °C (98 °F) 07/30/21 0949   Pulse 73 07/30/21 1144   Resp 17 07/30/21 1144   SpO2 98 % 07/30/21 1144   Vitals shown include unvalidated device data.

## 2021-07-30 NOTE — PERIOP NOTES
TRANSFER - OUT REPORT:    Verbal report given to [facility] on [address]  being transferred to [address]  (unit) for routine post - op       Report consisted of patients Situation, Background, Assessment and   Recommendations(SBAR). Information from the following report(s) SBAR, Procedure Summary, Intake/Output and MAR was reviewed with the receiving nurse. Lines:   Peripheral IV 07/30/21 Anterior; Left Hand (Active)   Site Assessment Clean, dry, & intact 07/30/21 1217   Phlebitis Assessment 0 07/30/21 1217   Infiltration Assessment 0 07/30/21 1217   Dressing Status Clean, dry, & intact 07/30/21 1217   Dressing Type Transparent 07/30/21 1217   Hub Color/Line Status Blue; Infusing 07/30/21 1217        Opportunity for questions and clarification was provided.       Patient transported with:   Registered Nurse

## 2021-07-30 NOTE — PROGRESS NOTES
Patient interviewed and examined in post-op recovery    Patient appears comfortable and states that her operative site pain is under good control  Tolerating oral intake well without nausea, vomiting, aspiration  Phonating well    Sitting upright in bed  Alert and fully oriented  T98 HR 73 RR 16 /78 SpO2 99% on O2 of 3 L/min  Anterior cervical incision clean, dry and intact with steri strips securely in place  No dysphonia, dysarthria or audible hoarseness  No signs of operative site hematoma    DOS s/p uncomplicated neck exploration and total thyroidectomy  Details of operative procedure and findings explained to patient  All of her stated questions were answered to her apparent satisfaction    Continue to monitor serum Ca/Mg/Phos  Continue Vit D/Caclcium replacement  Aspiration precautions  Overnight observation    Lai Acuña MD [facility]

## 2021-07-30 NOTE — PERIOP NOTES
Blood pressure elevated after several checks. Call to Dr Kristen Serrano. Order for hydralazine 10mg. 1215-Medication given and blood pressure returned to baseline.

## 2021-07-30 NOTE — PROGRESS NOTES
1230 TRANSFER - IN REPORT:    Verbal report received from Eric Martines, [facility] (name) on [address] Farmer  being received from [facility] (unit) for routine post - op      Report consisted of patients Situation, Background, Assessment and   Recommendations(SBAR). Information from the following report(s) SBAR, Kardex, Procedure Summary, Intake/Output, MAR, Accordion, Recent Results and Med Rec Status was reviewed with the receiving nurse. Opportunity for questions and clarification was provided. Assessment completed upon patients arrival to unit and care assumed.

## 2021-07-30 NOTE — OP NOTES
Date of Procedure: 30 July 2021    Pre-operative Diagnosis: Multi-nodular goiter  Post-operative Diagnosis: Multi-nodular goiter  Procedure(s):   Neck exploration   Total Thyroidectomy  Laryngeal nerve monitoring  Surgeon(s) and Role:   * Jameel Reddy MD - Co-surgeon  * Lamont Salinas MD, LIANNA FACS - Co-surgeon   Anesthesia: General endotracheal anesthesia (GETA)  Urine output: Not documented  Estimated Blood Loss: 2 ml   IVF: 500 ml crystalloid             Drains: None  Patient in room at 0807 hours  Antibiotic prophylaxis: ANCEF 2 g at 0824 hours  Preliminary time out at 0824 hours  Beta blocker not administered prior to operation  Prayer at 0834 hours  Time out for surgery at 0834 hours    (During the time out for surgery the correct patient, operative site and procedure were confirmed, along with having the necessary equipment on hand to perform the operation safely)  Start of surgery at 0834 hours   End of surgery at 0917 hours   VTE prophylaxis with bilateral lower extremity compression devices   Pressure points padded   Sponge, sharp and instrument count: Correct  History:   62-year-old euthyroid female with multi-nodular goiter and progressive growth of right thyroid nodule. Thyroid functions tests:  06/03/2021  FT3 3.6 pg/ml  FT4 1.1 ng/dl  TSH 1.10 uIU/ml  No history of head or neck radiation. No history of FUENTES treatment   No personal or family history of thyroid cancer. 06/27/2019  EXAM: US THYROID/PARATHYROID/SOFT TISS   INDICATION: Nontoxic single thyroid nodule. COMPARISON: None. TECHNIQUE: Real-time sonography of the thyroid gland was performed with a high  frequency linear transducer. Multiple static images were obtained. FINDINGS: The background echotexture of the thyroid gland is homogeneous. There are 3 discrete nodules with characteristics as follows:  1.  Right mid to lower pole: 2.7 x 1.8 x 2.7 cm well-circumscribed predominantly  solid, isoechoic nodule which is wider than tall . Few small cystic areas noted. No echogenic foci. (TR 3)  2. Left lobe: 3.4 x 2.2 x 3.1 cm well-circumscribed isoechoic solid nodule which  is wider than tall and contains no calcifications. (TR 3). 3. Isthmus/medial right lobe: 2.6 x 2.3 x 1.5 cm predominantly solid nodule with  a peripheral cystic area, well-circumscribed, and wider than tall. No  calcifications. The right lobe measures 5.3 x 3.1 x 2.5 cm cm and the left lobe measures 7.4 x  3.9 x 4.5 cm. The isthmus measures 2 mm, not including the nodule. The thyroid  extends to the infraclavicular region. IMPRESSION:  Enlarged thyroid gland containing 3 nodules as described in detail above. All 3 of these nodules have a TiRads classification of TR 3 (mildly suspicious). In  nodules with a TR 3 classification greater than 2.5 cm in diameter, FNA should  be considered. FNA Left Thyroid: Benign follicular nodule  FNA Right Thyroid: Benign follicular nodule  70/96/0466  INDICATION: mass in neck, difficulty swallowing and breathing    COMPARISON: None  TECHNIQUE:  Axial neck CT was performed after the uneventful administration of  IV contrast. Sagittal and coronal reformats were generated. CT dose reduction was achieved through use of a standardized protocol tailored  for this examination and automatic exposure control for dose modulation. CONTRAST: 100 mL Isovue 370  FINDINGS:   NASOPHARYNX: Normal.  SUPRAHYOID NECK: Normal oropharynx, oral cavity, parapharyngeal space, and  retropharyngeal space. INFRAHYOID NECK: Normal larynx, hypopharynx and supraglottis. PAROTID GLANDS: Normal.  SUBMANDIBULAR GLANDS: Normal.  THYROID GLAND: Markedly enlarged, with a 4.3 cm nodule in the left lobe and a  3.8 cm nodule in the right lobe. The thyroid produces minimal mass effect on the  trachea. LYMPH NODES: None enlarged by CT size criteria . LUNG APICES: Clear. OSSEOUS STRUCTURES: No destructive bone lesion. ADDITIONAL COMMENTS: N/A.   IMPRESSION: Markedly enlarged thyroid gland with large bilateral thyroid nodules; these seem to correspond to the palpable lump. No evidence of significant airway compromise. Procedure (s) performed:   Neck exploration   Total Thyroidectomy  Laryngeal nerve monitoring  Specimens: Total Thyroidectomy - single short stitch right superior thyroid pole; double stitch anterior isthmus  Findings:    The right thyroid lobe measures 6.5 x 4.0 x 3.0 cm.  The left thyroid lobe measures 7.5 x 5.0 x 3.5 cm.  The thyroid isthmus measures 5 mm in thickness.  4.0 x 3.0 cm right thyroid nodule, encapsulated without suspicious finding.  5.0 x 4.0 cm left thyroid nodule, encapsulated without suspicious finding.  3.5 x 2.5 cm right/isthmus thyroid nodule, encapsulated without suspicious finding.  No palpable central or lateral neck adenopathy.  Two normal appearing parathyroid glands (right superior and right inferior) identified and preserved on a vascular pedicle.  Two normal appearing parathyroid glands (left superior and left inferior) identified and preserved on a vascular pedicle.     Both recurrent laryngeal nerves were identified and carefully preserved throughout the entire course of the operation    Both recurrent laryngeal nerves were confirmed to be structurally and functionally intact - audible signal attained with nerve stimulator / NIM system when applied to both the right and left recurrent laryngeal nerve    Excellent hemostasis confirmed at end of operation    Pre-op holding area:   Patient seen in pre-operative holding area   Operative site marked on anterior neck  Patient was informed of the indications, nature, risks, benefits, alternatives and expected outcomes of the proposed operation:   Neck Exploration, Laryngeal Neuromonitoring, Total Thyroidectomy, possible central neck dissection  The patient voiced understanding of the aforesaid and provided informed consent to proceed   The patient asked pertinent questions, all of which were answered to her apparent satisfaction   Operative Procedure: The patient was escorted to the operating room   Upon arrival to the operative room, the patient, procedure, and operative site were confirmed via a pre-operative time-out. During the time out for surgery the correct patient, operative site and procedure were confirmed, along with having the necessary equipment on hand to perform the operation safely. All in attendance agreed. The patient was placed in the supine position and general anesthesia induced without incident. General endotracheal anesthesia was induced with endotracheal placement of NIM tube to facilitate laryngeal neuro-monitoring. The eyes were taped shut. Prophylactic antibiotic (ANCEF 3.0 g) was administered prior to the procedure. Beta blockade was not administered prior to operation   With the patient in the supine position the arms were tucked, a pillow was placed behind the knees and the heels padded; the neck was slightly extended, and table elevated to 30 degrees. A roll was placed behind the scapulae to create mild degree of neck flexion  The occiput was placed on a cushion. Subcutaneous NIM monitoring system leads were placed. Pressure ulcer prophylaxis was in effect with pressure point padding. VTE prophylaxis was also in effect with lower extremity mechanical compression devices   Sterile anterior neck/sternum prep (Chlorhexidine - alcohol) and drape   We prayed for our patient, and we repeated the TIME OUT prior to commencing the operation to confirm the    correct patient,    correct operative site,    correct operative procedure and    necessary equipment on hand to conduct the operation safely.   Local anesthesia (50:50 mix of 1% LIDO w/ EPI - 6 ml) infiltrated subcutaneously at site of proposed anterior cervical skin incision   Low anterior, transverse cervical incision was made, 8 cm in length, two finger breadths superior to the sternoclavicular joints, and parallel to the normal skin lines of the neck. The skin, subcutaneous tissue and platysma muscle were divided   Sub-platysmal flaps were created by dissecting in the avascular plane superiorly to the thyroid cartilage and inferiorly to the suprasternal notch. Strap musculature (sternohyoid muscle and sternothyroid muscle) was  in the avascular midline (by incising the cervical linea alba), the incision extending from the suprasternal notch to the thyroid cartilage. The anterior jugular veins were ligated and divided. The anterior jugular veins empty into the jugular arch low in the neck anterior to the strap musculature. The jugular arch was ligated and divided in the space of Burns. A plane of dissection was developed between the posterior surface of the strap musculature (sternothyroid muscle) and the anterior surface of the thyroid gland. The right lateral thyroid recess was developed using gentle blunt dissection. The right thyroid lobe was gently lifted anteriorly. The right middle thyroid vein was isolated (no non-recurrent laryngeal nerve was observed). The right middle thyroid vein was controlled, ligated, and divided with Harmonic scalpel on the right thyroid capsule. We then focused our attention on the right superior thyroid pole  The space was developed between the right cricothyroid muscle and medial aspect of right superior thyroid pole. Recognizing that variations exist between the course of the superior laryngeal nerves and the first branch of the external carotid artery (the superior thyroid artery), we took great care to dissect the superior thyroid artery and its branches prior to ligation. We were careful to keep the external branch of the superior laryngeal nerve (EBSLN, which provides motor fibers to the cricothyroid muscle and the inferior pharyngeal constrictor muscle) out of harms way.   The right thyroid lobe was retracted in a caudal and lateral direction. The right superior pole vessels (right superior thyroid artery and right superior thyroid vein) were skeletonized and meticulously controlled individually (with Harmonic scalpel division) on the thyroid capsule to avoid injury to the right superior laryngeal nerve's external branch. The right recurrent laryngeal nerve, which originates from the vagus nerve, loops around the right subclavian artery, and ascends in the neck between the trachea and esophagus. The right recurrent laryngeal nerve was palpable and visually identified in the right trachea-esophageal (T-E) groove in proximity to the right inferior thyroid artery and right inferior thyroid vein. The right recurrent laryngeal nerves terminal branches provide motor innervation to all intrinsic muscles of the larynx except the cricothyroid muscle - innervated by EBSLN. The branches of the right inferior thyroid artery and branches of the right inferior thyroid vein were carefully dissected while maintaining the encountered right recurrent laryngeal nerve in view and out of harms way. The right inferior thyroid artery sends branches to both the superior and inferior parathyroid glands. The superior parathyroid gland (usually encountered at the Ligament of Berry lateral to the recurrent nerve) is more posterior in position relative to the more anteriorly situated inferior parathyroid gland (usually encountered anterior to the recurrent nerve, inferior to where the nerve crosses the inferior thyroid artery). The right inferior thyroid artery branches were controlled on the thyroid capsule after its identified parathyroid branches were given off. Branches of the right inferior thyroid vein were controlled on the thyroid capsule with Harmonic scalpel to avoid injury to the right inferior parathyroid gland that was identified anterior and medial to the right recurrent laryngeal nerve.    Both the inferior parathyroid gland and right recurrent laryngeal nerve were meticulously preserved intact and maintained out of harms way  The right inferior parathyroid gland was normal in size and gross appearance, and well perfused with its vasculature carefully preserved intact. Diligent search for the right superior parathyroid gland superior to the middle thyroid vein and posterior and lateral to the right recurrent laryngeal nerve identified a normal appearing, well perfused right superior parathyroid gland, which was also carefully maintained out of harms way. The right superior parathyroid gland was carefully preserved on its vascular pedicle. The right tubercle of Zukerkandl of the thyroid was identified and gently dissected free of both identified branches of the right recurrent laryngeal nerve. We recognize that this particular area where the right recurrent laryngeal nerve most closely approximates the thyroid gland, and the terminal ascending branches of the inferior thyroid artery is where the recurrent nerve is at greatest risk of injury. The terminal branches of the right recurrent nerve may be non-recurrent (rarely), or may be anterior, posterior or in between the branches of the ascending right inferior thyroid artery. The Ligament of Arleta Slovenian was identified at the posterolateral portion of the thyroid lobe just caudal to the cricoid cartilage, and it was gently dissected to identify its relationship to the terminal aspects of the right recurrent laryngeal nerve, which was found to be passing posterior to the Ligament of Pope to gain entry into the larynx. The terminal branches of the right inferior thyroid artery were meticulously controlled with bi-polar cautery taking care to spare the adjacent right recurrent laryngeal nerve.   Both terminal right recurrent laryngeal nerve branches were identified and meticulously preserved, as they were seen passing under the inferior constrictor muscle, and through the cricothyroid membrane near the right cricothyroid joint, just superior to the cricoid cartilage, to enter the posterior and medial aspect of the larynx. The right thyroid lobe was further mobilized with care to the left. The attachments of the posterior right thyroid lobe and isthmus to the pre-tracheal fascia were divided with precision taking great care to avoid injury to the terminal branches of the right recurrent laryngeal nerve, which takes a more oblique course when compared to the more cephalo-caudad orientation of the recurrent laryngeal nerve on the left side of the neck, which also courses within the TE groove. The structural and functional integrity of the right recurrent laryngeal nerve was confirmed with the nerve stimulator ([employer] system), as a prominent, audible signal was attained with stimulation of the right recurrent nerve in its proximal, mid and distal cervical portions. Excellent hemostasis was confirmed along with normal superior, and inferior viable appearing/well perfused right parathyroid glands. Attention was then directed to the left neck. The same surgical principles and maneuvers were applied to the contralateral neck to complete the total thyroidectomy, briefly:  The left lateral thyroid recess was developed posterior to the left sternothyroid muscle utilizing gentle blunt dissection. The left middle thyroid was identified, dissected, ligated and divided with the Harmonic scalpel. The space between the left cricothyroid muscle and medial aspect of the left superior thyroid pole was developed using gentle dissection. The left superior thyroid pole vessels (left superior thyroid artery and left superior thyroid vein) were meticulously controlled on the thyroid capsule with Harmonic scalpel ligation and division, taking great care to avoid injury to the left superior laryngeal nerve's external branch.    The left thyroid lobe was gently mobilized anteromedially to the right. The left T-E groove was gently dissected. The left recurrent laryngeal nerve was encountered in proximity to the left inferior thyroid artery. The branches of the left inferior thyroid artery and left inferior thyroid vein were carefully controlled on the thyroid capsule in order to avoid injury and maintain blood supply to the identified, normal appearing left superior and inferior parathyroid glands. The well perfused, normal appearing left superior parathyroid gland was situated cephalad of the left middle thyroid vein, and posterior and lateral to the encountered left recurrent laryngeal nerve. Dissection anterior and medial to the left recurrent laryngeal nerve and near the left inferior thyroid pole identified a well perfused, normal appearing left inferior parathyroid gland. Both parathyroid glands appeared viable/well perfused, and were carefully preserved on their respective vascular pedicles, intact throughout the entire course of our dissection. The superior and medial aspect of the left thyroid lobe was dissected free of both identified branches of the left recurrent laryngeal nerve. Both branches of the left recurrent laryngeal nerve were seen terminating near the left cricothyroid joint, and both recurrent laryngeal nerve branches were carefully preserved. The Tubercle of Zukerkandl of the left thyroid lobe was identified and gently dissected free of both identified branches of the left recurrent laryngeal nerve. The attachments of the posterior left thyroid lobe to the trachea were divided taking care not to injure the clearly visualized terminal branches of the left recurrent laryngeal nerve. The total thyroidectomy specimen was delivered, oriented for pathological processing (single stitch right upper thyroid pole, double stitch anterior isthmus) and submitted for permanent pathology analysis.   The structural and functional integrity of the left recurrent laryngeal nerve, and the right recurrent laryngeal nerve was confirmed with the nerve stimulator (NIM System), as a prominent audible signal was attained when both branches of the left and right recurrent laryngeal nerves were stimulated. A diligent search of the central neck and both lateral areas of the neck was unrevealing; specifically, there was no palpable adenopathy in either the right or left lateral (Level II, III, IV) neck, or the central compartment (Level VI and VII). Excellent hemostasis was confirmed along with two viable appearing left parathyroid glands, and two viable appearing right parathyroid glands. **   An adjunctive tool, parathyroid detection system was utilized to confirm the surgeon-visualized parathyroid glands throughout the operation. The PTeye parathyroid detection system uses laser probe to evoke a fluorescent response from and confirm parathyroid tissue. Baseline PTeye was 11, with confirmatory detection ratio of   9.4 for the right superior parathyroid gland,  3.9 for the right inferior parathyroid gland,  7.4 for the left superior parathyroid gland,  9.0 for the left inferior parathyroid gland. **  Excellent hemostasis in the operative field was re-confirmed under Valsalva maneuver. Surgicel was placed in the operative field as a hemostatic adjunct. The strap musculature (sternothyroid and sternohyoid muscles) was re-approximated in the midline with running 3-0 Vicryl suture. A small space was left inferiorly between the strap muscles in the case of post-operative bleeding. The platysma muscle was reconstituted with running absorbable (3-0 Vicryl) suture   The wound was irrigated with saline solution. The skin incision was closed with running absorbable subcuticular suture (4-0 Monocryl). This was an uncomplicated operation.    The patient was extubated uneventfully in the operating room and transferred to the PACU in stable condition with aspiration precautions in effect   Complications: None   Implants: None  Disposition:  To PACU extubated and in stable condition with aspiration precautions in effect   Sarkis OZUNAA FACS   Lisette Malhotra MD

## 2021-07-30 NOTE — BRIEF OP NOTE
Brief Postoperative Note    Patient: Cassandra Green  YOB: 1985  MRN: [id number]    Date of Procedure: 7/30/2021       Pre-operative Diagnosis: Multi-nodular goiter  Post-operative Diagnosis: Multi-nodular goiter  Procedure(s):   Neck exploration   Total Thyroidectomy  Laryngeal nerve monitoring  Surgeon(s) and Role:   * Werner Hassan MD - Co-surgeon  * Verna Hensley MD, LIANNA FACS - Co-surgeon   Anesthesia: General endotracheal anesthesia (GETA)  Urine output: Not documented  Estimated Blood Loss: 2 ml   IVF: 500 ml crystalloid             Drains: None  Patient in room at 0807 hours  Antibiotic prophylaxis: ANCEF 2 g at 0824 hours  Preliminary time out at 0824 hours  Beta blocker not administered prior to operation  Prayer at 0834 hours  Time out for surgery at 0834 hours    (During the time out for surgery the correct patient, operative site and procedure were confirmed, along with having the necessary equipment on hand to perform the operation safely)  Start of surgery at 0834 hours   End of surgery at 0917 hours   VTE prophylaxis with bilateral lower extremity compression devices   Pressure points padded   Sponge, sharp and instrument count: Correct  History:   30-year-old euthyroid female with multi-nodular goiter and progressive growth of right thyroid nodule. Thyroid functions tests:  06/03/2021  FT3 3.6 pg/ml  FT4 1.1 ng/dl  TSH 1.10 uIU/ml  No history of head or neck radiation. No history of FUENTES treatment   No personal or family history of thyroid cancer. 06/27/2019  EXAM: US THYROID/PARATHYROID/SOFT TISS   INDICATION: Nontoxic single thyroid nodule. COMPARISON: None. TECHNIQUE: Real-time sonography of the thyroid gland was performed with a high  frequency linear transducer. Multiple static images were obtained. FINDINGS: The background echotexture of the thyroid gland is homogeneous. There are 3 discrete nodules with characteristics as follows:  1.  Right mid to lower pole: 2.7 x 1.8 x 2.7 cm well-circumscribed predominantly  solid, isoechoic nodule which is wider than tall . Few small cystic areas noted. No echogenic foci. (TR 3)  2. Left lobe: 3.4 x 2.2 x 3.1 cm well-circumscribed isoechoic solid nodule which  is wider than tall and contains no calcifications. (TR 3). 3. Isthmus/medial right lobe: 2.6 x 2.3 x 1.5 cm predominantly solid nodule with  a peripheral cystic area, well-circumscribed, and wider than tall. No  calcifications. The right lobe measures 5.3 x 3.1 x 2.5 cm cm and the left lobe measures 7.4 x  3.9 x 4.5 cm. The isthmus measures 2 mm, not including the nodule. The thyroid  extends to the infraclavicular region. IMPRESSION:  Enlarged thyroid gland containing 3 nodules as described in detail above. All 3 of these nodules have a TiRads classification of TR 3 (mildly suspicious). In  nodules with a TR 3 classification greater than 2.5 cm in diameter, FNA should  be considered. FNA Left Thyroid: Benign follicular nodule  FNA Right Thyroid: Benign follicular nodule  47/18/7182  INDICATION: mass in neck, difficulty swallowing and breathing    COMPARISON: None  TECHNIQUE:  Axial neck CT was performed after the uneventful administration of  IV contrast. Sagittal and coronal reformats were generated. CT dose reduction was achieved through use of a standardized protocol tailored  for this examination and automatic exposure control for dose modulation. CONTRAST: 100 mL Isovue 370  FINDINGS:   NASOPHARYNX: Normal.  SUPRAHYOID NECK: Normal oropharynx, oral cavity, parapharyngeal space, and  retropharyngeal space. INFRAHYOID NECK: Normal larynx, hypopharynx and supraglottis. PAROTID GLANDS: Normal.  SUBMANDIBULAR GLANDS: Normal.  THYROID GLAND: Markedly enlarged, with a 4.3 cm nodule in the left lobe and a  3.8 cm nodule in the right lobe. The thyroid produces minimal mass effect on the  trachea. LYMPH NODES: None enlarged by CT size criteria .   LUNG APICES: Clear.  OSSEOUS STRUCTURES: No destructive bone lesion. ADDITIONAL COMMENTS: N/A. IMPRESSION: Markedly enlarged thyroid gland with large bilateral thyroid nodules; these seem to correspond to the palpable lump. No evidence of significant airway compromise. Procedure (s) performed:   Neck exploration   Total Thyroidectomy  Laryngeal nerve monitoring  Specimens: Total Thyroidectomy - single short stitch right superior thyroid pole; double stitch anterior isthmus  Findings:    The right thyroid lobe measures 6.5 x 4.0 x 3.0 cm.  The left thyroid lobe measures 7.5 x 5.0 x 3.5 cm.  The thyroid isthmus measures 5 mm in thickness.  4.0 x 3.0 cm right thyroid nodule, encapsulated without suspicious finding.  5.0 x 4.0 cm left thyroid nodule, encapsulated without suspicious finding.  3.5 x 2.5 cm right/isthmus thyroid nodule, encapsulated without suspicious finding.  No palpable central or lateral neck adenopathy.  Two normal appearing parathyroid glands (right superior and right inferior) identified and preserved on a vascular pedicle.  Two normal appearing parathyroid glands (left superior and left inferior) identified and preserved on a vascular pedicle.  Both recurrent laryngeal nerves were identified and carefully preserved throughout the entire course of the operation    Both recurrent laryngeal nerves were confirmed to be structurally and functionally intact - audible signal attained with nerve stimulator / NIM system when applied to both the right and left recurrent laryngeal nerve    Excellent hemostasis confirmed at end of operation  Specimens:   ID Type Source Tests Collected by Time Destination   1 : total thyroidectomy Fresh Thyroid  Epi Clifton MD 7/30/2021 3875 Pathology        The patient was escorted to the operating room   Upon arrival to the operative room, the patient, procedure, and operative site were confirmed via a pre-operative time-out.    During the time out for surgery the correct patient, operative site and procedure were confirmed, along with having the necessary equipment on hand to perform the operation safely. All in attendance agreed. The patient was placed in the supine position and general anesthesia induced without incident. General endotracheal anesthesia was induced with endotracheal placement of NIM tube to facilitate laryngeal neuro-monitoring. The eyes were taped shut. Prophylactic antibiotic (ANCEF 3.0 g) was administered prior to the procedure. Beta blockade was not administered prior to operation   With the patient in the supine position the arms were tucked, a pillow was placed behind the knees and the heels padded; the neck was slightly extended, and table elevated to 30 degrees. A roll was placed behind the scapulae to create mild degree of neck flexion  The occiput was placed on a cushion. Subcutaneous NIM monitoring system leads were placed. Pressure ulcer prophylaxis was in effect with pressure point padding. VTE prophylaxis was also in effect with lower extremity mechanical compression devices   Sterile anterior neck/sternum prep (Chlorhexidine - alcohol) and drape   We prayed for our patient, and we repeated the TIME OUT prior to commencing the operation to confirm the    correct patient,    correct operative site,    correct operative procedure and    necessary equipment on hand to conduct the operation safely. Local anesthesia (50:50 mix of 1% LIDO w/ EPI - 6 ml) infiltrated subcutaneously at site of proposed anterior cervical skin incision   Low anterior, transverse cervical incision was made, 8 cm in length, two finger breadths superior to the sternoclavicular joints, and parallel to the normal skin lines of the neck.   The skin, subcutaneous tissue and platysma muscle were divided   Sub-platysmal flaps were created by dissecting in the avascular plane superiorly to the thyroid cartilage and inferiorly to the suprasternal notch.   Strap musculature (sternohyoid muscle and sternothyroid muscle) was  in the avascular midline (by incising the cervical linea alba), the incision extending from the suprasternal notch to the thyroid cartilage. The anterior jugular veins were ligated and divided. The anterior jugular veins empty into the jugular arch low in the neck anterior to the strap musculature. The jugular arch was ligated and divided in the space of Burns. A plane of dissection was developed between the posterior surface of the strap musculature (sternothyroid muscle) and the anterior surface of the thyroid gland. The right lateral thyroid recess was developed using gentle blunt dissection. The right thyroid lobe was gently lifted anteriorly. The right middle thyroid vein was isolated (no non-recurrent laryngeal nerve was observed). The right middle thyroid vein was controlled, ligated, and divided with Harmonic scalpel on the right thyroid capsule. We then focused our attention on the right superior thyroid pole  The space was developed between the right cricothyroid muscle and medial aspect of right superior thyroid pole. Recognizing that variations exist between the course of the superior laryngeal nerves and the first branch of the external carotid artery (the superior thyroid artery), we took great care to dissect the superior thyroid artery and its branches prior to ligation. We were careful to keep the external branch of the superior laryngeal nerve (EBSLN, which provides motor fibers to the cricothyroid muscle and the inferior pharyngeal constrictor muscle) out of harms way. The right thyroid lobe was retracted in a caudal and lateral direction.   The right superior pole vessels (right superior thyroid artery and right superior thyroid vein) were skeletonized and meticulously controlled individually (with Harmonic scalpel division) on the thyroid capsule to avoid injury to the right superior laryngeal nerve's external branch. The right recurrent laryngeal nerve, which originates from the vagus nerve, loops around the right subclavian artery, and ascends in the neck between the trachea and esophagus. The right recurrent laryngeal nerve was palpable and visually identified in the right trachea-esophageal (T-E) groove in proximity to the right inferior thyroid artery and right inferior thyroid vein. The right recurrent laryngeal nerves terminal branches provide motor innervation to all intrinsic muscles of the larynx except the cricothyroid muscle - innervated by EBSLN. The branches of the right inferior thyroid artery and branches of the right inferior thyroid vein were carefully dissected while maintaining the encountered right recurrent laryngeal nerve in view and out of harms way. The right inferior thyroid artery sends branches to both the superior and inferior parathyroid glands. The superior parathyroid gland (usually encountered at the Ligament of Berry lateral to the recurrent nerve) is more posterior in position relative to the more anteriorly situated inferior parathyroid gland (usually encountered anterior to the recurrent nerve, inferior to where the nerve crosses the inferior thyroid artery). The right inferior thyroid artery branches were controlled on the thyroid capsule after its identified parathyroid branches were given off. Branches of the right inferior thyroid vein were controlled on the thyroid capsule with Harmonic scalpel to avoid injury to the right inferior parathyroid gland that was identified anterior and medial to the right recurrent laryngeal nerve. Both the inferior parathyroid gland and right recurrent laryngeal nerve were meticulously preserved intact and maintained out of harms way  The right inferior parathyroid gland was normal in size and gross appearance, and well perfused with its vasculature carefully preserved intact.   Diligent search for the right superior parathyroid gland superior to the middle thyroid vein and posterior and lateral to the right recurrent laryngeal nerve identified a normal appearing, well perfused right superior parathyroid gland, which was also carefully maintained out of harms way. The right superior parathyroid gland was carefully preserved on its vascular pedicle. The right tubercle of Zukerkandl of the thyroid was identified and gently dissected free of both identified branches of the right recurrent laryngeal nerve. We recognize that this particular area where the right recurrent laryngeal nerve most closely approximates the thyroid gland, and the terminal ascending branches of the inferior thyroid artery is where the recurrent nerve is at greatest risk of injury. The terminal branches of the right recurrent nerve may be non-recurrent (rarely), or may be anterior, posterior or in between the branches of the ascending right inferior thyroid artery. The Ligament of Randa Ly was identified at the posterolateral portion of the thyroid lobe just caudal to the cricoid cartilage, and it was gently dissected to identify its relationship to the terminal aspects of the right recurrent laryngeal nerve, which was found to be passing posterior to the Ligament of Pope to gain entry into the larynx. The terminal branches of the right inferior thyroid artery were meticulously controlled with bi-polar cautery taking care to spare the adjacent right recurrent laryngeal nerve. Both terminal right recurrent laryngeal nerve branches were identified and meticulously preserved, as they were seen passing under the inferior constrictor muscle, and through the cricothyroid membrane near the right cricothyroid joint, just superior to the cricoid cartilage, to enter the posterior and medial aspect of the larynx. The right thyroid lobe was further mobilized with care to the left.    The attachments of the posterior right thyroid lobe and isthmus to the pre-tracheal fascia were divided with precision taking great care to avoid injury to the terminal branches of the right recurrent laryngeal nerve, which takes a more oblique course when compared to the more cephalo-caudad orientation of the recurrent laryngeal nerve on the left side of the neck, which also courses within the TE groove. The structural and functional integrity of the right recurrent laryngeal nerve was confirmed with the nerve stimulator ([employer] system), as a prominent, audible signal was attained with stimulation of the right recurrent nerve in its proximal, mid and distal cervical portions. Excellent hemostasis was confirmed along with normal superior, and inferior viable appearing/well perfused right parathyroid glands. Attention was then directed to the left neck. The same surgical principles and maneuvers were applied to the contralateral neck to complete the total thyroidectomy, briefly:  The left lateral thyroid recess was developed posterior to the left sternothyroid muscle utilizing gentle blunt dissection. The left middle thyroid was identified, dissected, ligated and divided with the Harmonic scalpel. The space between the left cricothyroid muscle and medial aspect of the left superior thyroid pole was developed using gentle dissection. The left superior thyroid pole vessels (left superior thyroid artery and left superior thyroid vein) were meticulously controlled on the thyroid capsule with Harmonic scalpel ligation and division, taking great care to avoid injury to the left superior laryngeal nerve's external branch. The left thyroid lobe was gently mobilized anteromedially to the right. The left T-E groove was gently dissected. The left recurrent laryngeal nerve was encountered in proximity to the left inferior thyroid artery.    The branches of the left inferior thyroid artery and left inferior thyroid vein were carefully controlled on the thyroid capsule in order to avoid injury and maintain blood supply to the identified, normal appearing left superior and inferior parathyroid glands. The well perfused, normal appearing left superior parathyroid gland was situated cephalad of the left middle thyroid vein, and posterior and lateral to the encountered left recurrent laryngeal nerve. Dissection anterior and medial to the left recurrent laryngeal nerve and near the left inferior thyroid pole identified a well perfused, normal appearing left inferior parathyroid gland. Both parathyroid glands appeared viable/well perfused, and were carefully preserved on their respective vascular pedicles, intact throughout the entire course of our dissection. The superior and medial aspect of the left thyroid lobe was dissected free of both identified branches of the left recurrent laryngeal nerve. Both branches of the left recurrent laryngeal nerve were seen terminating near the left cricothyroid joint, and both recurrent laryngeal nerve branches were carefully preserved. The Tubercle of Zukerkandl of the left thyroid lobe was identified and gently dissected free of both identified branches of the left recurrent laryngeal nerve. The attachments of the posterior left thyroid lobe to the trachea were divided taking care not to injure the clearly visualized terminal branches of the left recurrent laryngeal nerve. The total thyroidectomy specimen was delivered, oriented for pathological processing (single stitch right upper thyroid pole, double stitch anterior isthmus) and submitted for permanent pathology analysis. The structural and functional integrity of the left recurrent laryngeal nerve, and the right recurrent laryngeal nerve was confirmed with the nerve stimulator ([employer]), as a prominent audible signal was attained when both branches of the left and right recurrent laryngeal nerves were stimulated.    A diligent search of the central neck and both lateral areas of the neck was unrevealing; specifically, there was no palpable adenopathy in either the right or left lateral (Level II, III, IV) neck, or the central compartment (Level VI and VII). Excellent hemostasis was confirmed along with two viable appearing left parathyroid glands, and two viable appearing right parathyroid glands. **   An adjunctive tool, parathyroid detection system was utilized to confirm the surgeon-visualized parathyroid glands throughout the operation. The PTeye parathyroid detection system uses laser probe to evoke a fluorescent response from and confirm parathyroid tissue. Baseline PTeye was 11, with confirmatory detection ratio of   9.4 for the right superior parathyroid gland,  3.9 for the right inferior parathyroid gland,  7.4 for the left superior parathyroid gland,  9.0 for the left inferior parathyroid gland. **  Excellent hemostasis in the operative field was re-confirmed under Valsalva maneuver. Surgicel was placed in the operative field as a hemostatic adjunct. The strap musculature (sternothyroid and sternohyoid muscles) was re-approximated in the midline with running 3-0 Vicryl suture. A small space was left inferiorly between the strap muscles in the case of post-operative bleeding. The platysma muscle was reconstituted with running absorbable (3-0 Vicryl) suture   The wound was irrigated with saline solution. The skin incision was closed with running absorbable subcuticular suture (4-0 Monocryl). This was an uncomplicated operation. The patient was extubated uneventfully in the operating room and transferred to the PACU in stable condition with aspiration precautions in effect   Complications: None   Implants: None  Disposition:  To PACU extubated and in stable condition with aspiration precautions in effect   Samson Masters MD, MBA [facility]   Yifan Naik MD      Electronically Signed by Samson Masters MD on 7/30/2021 at 11:58 AM

## 2021-07-31 VITALS
BODY MASS INDEX: 43.87 KG/M2 | OXYGEN SATURATION: 98 % | RESPIRATION RATE: 16 BRPM | SYSTOLIC BLOOD PRESSURE: 146 MMHG | TEMPERATURE: 98 F | WEIGHT: 273 LBS | DIASTOLIC BLOOD PRESSURE: 94 MMHG | HEART RATE: 78 BPM | HEIGHT: 66 IN

## 2021-07-31 LAB
CALCIUM SERPL-MCNC: 9.3 MG/DL (ref 8.5–10.1)
MAGNESIUM SERPL-MCNC: 1.9 MG/DL (ref 1.6–2.4)
PHOSPHATE SERPL-MCNC: 3.9 MG/DL (ref 2.6–4.7)

## 2021-07-31 PROCEDURE — 82310 ASSAY OF CALCIUM: CPT

## 2021-07-31 PROCEDURE — 99218 HC RM OBSERVATION: CPT

## 2021-07-31 PROCEDURE — 84100 ASSAY OF PHOSPHORUS: CPT

## 2021-07-31 PROCEDURE — 36415 COLL VENOUS BLD VENIPUNCTURE: CPT

## 2021-07-31 PROCEDURE — 83735 ASSAY OF MAGNESIUM: CPT

## 2021-07-31 PROCEDURE — 74011250637 HC RX REV CODE- 250/637: Performed by: OTOLARYNGOLOGY

## 2021-07-31 RX ORDER — FERROUS SULFATE, DRIED 160(50) MG
1 TABLET, EXTENDED RELEASE ORAL 2 TIMES DAILY WITH MEALS
Qty: 30 TABLET | Refills: 1 | Status: SHIPPED | OUTPATIENT
Start: 2021-07-31 | End: 2022-04-01

## 2021-07-31 RX ORDER — CALCITRIOL 0.25 UG/1
0.25 CAPSULE ORAL DAILY
Qty: 16 CAPSULE | Refills: 2 | Status: SHIPPED | OUTPATIENT
Start: 2021-07-31 | End: 2022-04-01

## 2021-07-31 RX ORDER — OXYCODONE AND ACETAMINOPHEN 5; 325 MG/1; MG/1
1 TABLET ORAL
Qty: 18 TABLET | Refills: 0 | Status: SHIPPED | OUTPATIENT
Start: 2021-07-31 | End: 2021-08-03

## 2021-07-31 RX ORDER — LEVOTHYROXINE SODIUM 150 UG/1
150 TABLET ORAL DAILY
Qty: 90 TABLET | Refills: 2 | Status: SHIPPED | OUTPATIENT
Start: 2021-07-31 | End: 2022-05-23 | Stop reason: SDUPTHER

## 2021-07-31 RX ADMIN — LEVOTHYROXINE SODIUM 150 MCG: 0.15 TABLET ORAL at 08:18

## 2021-07-31 RX ADMIN — OXYCODONE AND ACETAMINOPHEN 1 TABLET: 10; 325 TABLET ORAL at 08:20

## 2021-07-31 RX ADMIN — POTASSIUM CHLORIDE 20 MEQ: 750 TABLET, FILM COATED, EXTENDED RELEASE ORAL at 08:18

## 2021-07-31 RX ADMIN — CALCIUM CARBONATE-VITAMIN D TAB 500 MG-200 UNIT 1 TABLET: 500-200 TAB at 06:37

## 2021-07-31 RX ADMIN — OXYCODONE AND ACETAMINOPHEN 1 TABLET: 10; 325 TABLET ORAL at 04:12

## 2021-07-31 RX ADMIN — CHLORTHALIDONE 25 MG: 25 TABLET ORAL at 08:17

## 2021-07-31 RX ADMIN — CALCITRIOL CAPSULES 0.25 MCG 0.25 MCG: 0.25 CAPSULE ORAL at 08:17

## 2021-07-31 NOTE — PROGRESS NOTES
Patient interviewed and examined  Uneventful night     Patient appears comfortable and states that pain under good control  Tolerating oral intake well without nausea, vomiting, aspiration  Phonating well     Sitting upright in bed  Alert and fully oriented    VS: T 98.5 HR 80 RR 16 /86 SpO2 98% on room air  Anterior cervical incision clean, dry and intact with steri strips securely in place  No dysphonia, dysarthria or audible hoarseness  No signs of operative site hematoma     Serum Ca/Mg/Phos: 9.4/1.8/2.0    POD1 s/p uncomplicated neck exploration and total thyroidectomy    Uncomplicated post-operative course  Doing well    Discharge criteria met  Discharge instructions provided    Thyroid hormone and calcium replacement to continue as an outpatient      Sindi Espinal MD [facility] FACS

## 2021-07-31 NOTE — DISCHARGE INSTRUCTIONS
Post Thyroidectomy Instructions    Follow up: with Dr. Claudine Lucero 2 weeks after surgery to have your steristrips removed. Shortly after surgery call [phone] to schedule this appointment.  Eat regular foods.  You may shower in 24 hours. Do not allow direct water pressure on your wound. If water trickles down while washing your hair, allow the wound to dry on its own.  Do not scrub or soak your wound for 2 weeks or 14 days.  No strenuous activity: for 14 days.  No moving more than 15 pounds: for 14 days. Then includes pulling, pushing, tugging, throwing.  There is generally not a lot of pain: with this surgery. Take your pain medication as needed. Most patients, if they do have pain, will have neck stiffness/discomfort. You may have numbness or tingling surrounding the area of your wound. Narcotics can cause constipation; use your Colace if this is the case.  Nausea and vomiting: from lingering effects of general anesthesia usually resolves by the following day. The narcotic pain medication can cause nausea and vomiting. They should be taken with food or fluids to minimize this. Medications that reduce nausea and vomiting can be prescribed by your physician.  Fever above 100.4, redness around wound, pus drainage from wound: call your doctor.  Bleeding: is uncommon (less than 1%). If it does occur your neck will develop a fullness. It is good to take a look at your neck shortly after surgery to see what a baseline appearance is. If there are changes to this, then call your provider. \    CALL [phone] for concerns - a doctor is on call 24/7     Special Instructions for if you had both sides of your thyroid removed or the remainder of your thyroid removed:    o You may be on Calcium ([place]) - it is very important that you take this.   Dr. Claudine Lucero will start a taper at your follow up visit  o If you experience tingling in the hands or around your mouth, your calcium may be dropping, go to the emergency room immediately and tell them you had your thyroid removed.  o You will be started on a dose of thyroid hormone replacement if you did not have high levels of thyroid hormone prior to surgery. Take this every day.

## 2021-07-31 NOTE — PROGRESS NOTES
6207 Verbal shift change report given to Nancie Cook RN (oncoming nurse) by Ada Leggett RN (offgoing nurse). Report included the following information SBAR, Kardex, Procedure Summary, Intake/Output, MAR, Accordion, Recent Results and Med Rec Status. 6338 Discharge instructions reviewed with patient - no questions at this time.

## 2022-01-29 ENCOUNTER — HOSPITAL ENCOUNTER (EMERGENCY)
Age: 37
Discharge: HOME | End: 2022-01-29
Attending: EMERGENCY MEDICINE
Payer: COMMERCIAL

## 2022-01-29 VITALS
OXYGEN SATURATION: 100 % | BODY MASS INDEX: 44.98 KG/M2 | RESPIRATION RATE: 20 BRPM | HEIGHT: 65 IN | HEART RATE: 83 BPM | DIASTOLIC BLOOD PRESSURE: 105 MMHG | SYSTOLIC BLOOD PRESSURE: 146 MMHG | TEMPERATURE: 98.3 F | WEIGHT: 270 LBS

## 2022-01-29 DIAGNOSIS — B96.89 BV (BACTERIAL VAGINOSIS): ICD-10-CM

## 2022-01-29 DIAGNOSIS — N76.0 BV (BACTERIAL VAGINOSIS): ICD-10-CM

## 2022-01-29 DIAGNOSIS — N30.00 ACUTE CYSTITIS WITHOUT HEMATURIA: Primary | ICD-10-CM

## 2022-01-29 LAB
APPEARANCE UR: ABNORMAL
BACTERIA URNS QL MICRO: ABNORMAL /HPF
BILIRUB UR QL: NEGATIVE
CLUE CELLS VAG QL WET PREP: NORMAL
COLOR UR: ABNORMAL
EPITH CASTS URNS QL MICRO: ABNORMAL /LPF
GLUCOSE UR STRIP.AUTO-MCNC: NEGATIVE MG/DL
HCG UR QL: NEGATIVE
HGB UR QL STRIP: NEGATIVE
KETONES UR QL STRIP.AUTO: NEGATIVE MG/DL
KOH PREP SPEC: NORMAL
LEUKOCYTE ESTERASE UR QL STRIP.AUTO: ABNORMAL
NITRITE UR QL STRIP.AUTO: NEGATIVE
PH UR STRIP: 7 [PH] (ref 5–8)
PROT UR STRIP-MCNC: ABNORMAL MG/DL
RBC #/AREA URNS HPF: ABNORMAL /HPF (ref 0–5)
SERVICE CMNT-IMP: NORMAL
SP GR UR REFRACTOMETRY: 1.01 (ref 1–1.03)
T VAGINALIS VAG QL WET PREP: NORMAL
UA: UC IF INDICATED,UAUC: ABNORMAL
UROBILINOGEN UR QL STRIP.AUTO: 1 EU/DL (ref 0.2–1)
WBC URNS QL MICRO: ABNORMAL /HPF (ref 0–4)

## 2022-01-29 PROCEDURE — 74011250637 HC RX REV CODE- 250/637: Performed by: EMERGENCY MEDICINE

## 2022-01-29 PROCEDURE — 74011000250 HC RX REV CODE- 250: Performed by: EMERGENCY MEDICINE

## 2022-01-29 PROCEDURE — 74011250636 HC RX REV CODE- 250/636: Performed by: EMERGENCY MEDICINE

## 2022-01-29 PROCEDURE — 87210 SMEAR WET MOUNT SALINE/INK: CPT

## 2022-01-29 PROCEDURE — 81025 URINE PREGNANCY TEST: CPT

## 2022-01-29 PROCEDURE — 87491 CHLMYD TRACH DNA AMP PROBE: CPT

## 2022-01-29 PROCEDURE — 96372 THER/PROPH/DIAG INJ SC/IM: CPT

## 2022-01-29 PROCEDURE — 99283 EMERGENCY DEPT VISIT LOW MDM: CPT

## 2022-01-29 PROCEDURE — 81001 URINALYSIS AUTO W/SCOPE: CPT

## 2022-01-29 RX ORDER — METRONIDAZOLE 500 MG/1
500 TABLET ORAL 2 TIMES DAILY
Qty: 14 TABLET | Refills: 0 | Status: SHIPPED | OUTPATIENT
Start: 2022-01-29 | End: 2022-02-05

## 2022-01-29 RX ORDER — CEPHALEXIN 500 MG/1
500 CAPSULE ORAL 2 TIMES DAILY
Qty: 10 CAPSULE | Refills: 0 | Status: SHIPPED | OUTPATIENT
Start: 2022-01-29 | End: 2022-04-01

## 2022-01-29 RX ORDER — AZITHROMYCIN 500 MG/1
1000 TABLET, FILM COATED ORAL
Status: COMPLETED | OUTPATIENT
Start: 2022-01-29 | End: 2022-01-29

## 2022-01-29 RX ADMIN — AZITHROMYCIN MONOHYDRATE 1000 MG: 500 TABLET ORAL at 15:38

## 2022-01-29 RX ADMIN — LIDOCAINE HYDROCHLORIDE 500 MG: 10 INJECTION, SOLUTION EPIDURAL; INFILTRATION; INTRACAUDAL; PERINEURAL at 15:38

## 2022-01-29 NOTE — LETTER
2/1/2022      Roldan Marx  [address] [place] [id number]      Dear Ms. Shorty Lee were seen in the Emergency Department of [address] [place] [address] on 1/29/22 and had lab and/or radiology tests performed. The chlamydia from your Emergency Department visit on 1/29/21 was positive. You were treated appropriately during your visit. No further treatment is required. Please notify any sexual partners as they need tested and treated as well. If you have any questions please contact the Emergency Department at [phone].       Sincerely,    AJ Saavedra    [facility] EMERGENCY DEPT  [address] [facility] [id number] [phone]

## 2022-01-29 NOTE — ED NOTES
Pt presents to ED ambulatory complaining of ABD pain. Pt is alert and oriented x 4, RR even and unlabored, skin is warm and dry. Assessment completed and pt updated on plan of care. Call bell in reach. Emergency Department Nursing Plan of Care       The Nursing Plan of Care is developed from the Nursing assessment and Emergency Department Attending provider initial evaluation. The plan of care may be reviewed in the ED Provider note.     The Plan of Care was developed with the following considerations:   Patient / Family readiness to learn indicated by:verbalized understanding  Persons(s) to be included in education: patient  Barriers to Learning/Limitations:No    Signed     Magda Zepeda RN    1/29/2022   1:50 PM

## 2022-01-29 NOTE — ED PROVIDER NOTES
Left flank pain,nausea,vaginal discharge<creamy> for 2-3 days. The history is provided by the patient. No  was used. Flank Pain   This is a new problem. The current episode started 2 days ago. The problem has not changed since onset. The problem occurs constantly. Patient reports not work related injury. The pain is associated with no known injury. The pain is present in the left side. The quality of the pain is described as sharp. The pain radiates to the left groin. The pain is at a severity of 3/10. The pain is mild. Exacerbated by: nothing. The pain is the same all the time. Pertinent negatives include no chest pain, no fever, no abdominal swelling, no perianal numbness, no pelvic pain and no weakness. She has tried nothing for the symptoms. Risk factors include obesity. Past Medical History:   Diagnosis Date    Cigarette smoker     CERVANTES (dyspnea on exertion) 06/29/2011    Edema of lower extremity 06/29/2011    Hypertension     IGT (impaired glucose tolerance) 05/07/2019    Morbid obesity with BMI of 40.0-44.9, adult (HCC)     Respiratory abnormalities     bronchitis    Thyroid nodule 07/24/2019    Consistent with a benign follicular nodule (includes adenomatoid nodule       No past surgical history on file.       Family History:   Problem Relation Age of Onset    HIV/AIDS Mother    [place] Arthritis-rheumatoid Mother     Anesth Problems Neg Hx        Social History     Socioeconomic History    Marital status: SINGLE     Spouse name: Not on file    Number of children: Not on file    Years of education: Not on file    Highest education level: Not on file   Occupational History    Not on file   Tobacco Use    Smoking status: Current Every Day Smoker     Packs/day: 0.25     Years: 19.00     Pack years: 4.75    Smokeless tobacco: Never Used   Vaping Use    Vaping Use: Never used   Substance and Sexual Activity    Alcohol use: Yes     Comment: occasionally    Drug use: No    Sexual activity: Yes     Partners: Male   Other Topics Concern    Not on file   Social History Narrative    Habits:  Smokes now down to a cigarette a day, she is trying to stop. She is a social drinker. She occasionally uses marijuana.         Social History:  The patient is single, her mom lives with her. She has seven children ages 3-16, two sons and five daughters. She completed high school and is gainfully employed for [employer]. She has no Zoroastrianism background.         Family History:  Father is unknown. Mother is 54 with RA. One brother, one sister, alive and well. Social Determinants of Health     Financial Resource Strain:     Difficulty of Paying Living Expenses: Not on file   Food Insecurity:     Worried About Running Out of Food in the Last Year: Not on file    [place] of Food in the Last Year: Not on file   Transportation Needs:     Lack of Transportation (Medical): Not on file    Lack of Transportation (Non-Medical): Not on file   Physical Activity:     Days of Exercise per Week: Not on file    Minutes of Exercise per Session: Not on file   Stress:     Feeling of Stress : Not on file   Social Connections:     Frequency of Communication with Friends and Family: Not on file    Frequency of Social Gatherings with Friends and Family: Not on file    Attends Sabianist Services: Not on file    Active Member of [address] [place] [address] [address] or Organizations: Not on file    Attends Club or Organization Meetings: Not on file    Marital Status: Not on file   Intimate Partner Violence:     Fear of Current or Ex-Partner: Not on file    Emotionally Abused: Not on file    Physically Abused: Not on file    Sexually Abused: Not on file   Housing Stability:     Unable to Pay for Housing in the Last Year: Not on file    Number of [place] in the Last Year: Not on file    Unstable Housing in the Last Year: Not on file         ALLERGIES: Patient has no known allergies.     Review of Systems Constitutional: Negative for fever. Cardiovascular: Negative for chest pain. Genitourinary: Positive for flank pain and vaginal discharge. Negative for pelvic pain. Neurological: Negative for weakness. All other systems reviewed and are negative. Vitals:    01/29/22 1222   BP: (!) 146/105   Pulse: 83   Resp: 20   Temp: 98.3 °F (36.8 °C)   SpO2: 100%   Weight: 122.5 kg (270 lb)   Height: 5' 5\" (1.651 m)            Physical Exam  Vitals and nursing note reviewed. Constitutional:       General: She is not in acute distress. Appearance: She is well-developed. HENT:      Head: Normocephalic and atraumatic. Mouth/Throat:      Pharynx: No oropharyngeal exudate. Eyes:      General:         Left eye: No discharge. Conjunctiva/sclera: Conjunctivae normal.      Pupils: Pupils are equal, round, and reactive to light. Neck:      Vascular: No JVD. Cardiovascular:      Rate and Rhythm: Normal rate and regular rhythm. Heart sounds: Normal heart sounds. Pulmonary:      Effort: Pulmonary effort is normal. No respiratory distress. Breath sounds: Normal breath sounds. No wheezing. Abdominal:      General: Bowel sounds are normal. There is no distension. Palpations: Abdomen is soft. Tenderness: There is no abdominal tenderness. There is no guarding or rebound. Musculoskeletal:         General: No tenderness. Normal range of motion. Cervical back: Normal range of motion and neck supple. Lymphadenopathy:      Cervical: No cervical adenopathy. Skin:     General: Skin is warm and dry. Findings: No rash. Neurological:      Mental Status: She is alert and oriented to person, place, and time. Cranial Nerves: No cranial nerve deficit. Deep Tendon Reflexes: Reflexes are normal and symmetric.    Psychiatric:         Behavior: Behavior normal.          MDM  Number of Diagnoses or Management Options  Diagnosis management comments: Kidney stones,std,UTI Procedures    Progress note:   3:07 PM  Reviewed exam findings, diagnostic results, and clinical impression with patient. Encouraged patient to ask questions, discussed need for follow up, and return precautions. Patient understands and agrees. Ready for home.

## 2022-02-01 LAB
C TRACH RRNA SPEC QL NAA+PROBE: POSITIVE
N GONORRHOEA RRNA SPEC QL NAA+PROBE: NEGATIVE
SPECIMEN SOURCE: ABNORMAL

## 2022-02-01 NOTE — PROGRESS NOTES
Patient was notified by telephone. Patient was treated appropriately in the emergency department. Patient to follow-up with PCP and notify sexual partners. Letter sent to patient's . address

## 2022-03-15 ENCOUNTER — OFFICE VISIT (OUTPATIENT)
Dept: OBGYN CLINIC | Age: 37
End: 2022-03-15
Payer: COMMERCIAL

## 2022-03-15 VITALS
BODY MASS INDEX: 44.33 KG/M2 | SYSTOLIC BLOOD PRESSURE: 151 MMHG | DIASTOLIC BLOOD PRESSURE: 109 MMHG | WEIGHT: 266.4 LBS

## 2022-03-15 DIAGNOSIS — Z01.419 ENCOUNTER FOR GYNECOLOGICAL EXAMINATION (GENERAL) (ROUTINE) WITHOUT ABNORMAL FINDINGS: Primary | ICD-10-CM

## 2022-03-15 DIAGNOSIS — E66.01 MORBID OBESITY WITH BMI OF 40.0-44.9, ADULT (HCC): ICD-10-CM

## 2022-03-15 PROCEDURE — 99385 PREV VISIT NEW AGE 18-39: CPT | Performed by: OBSTETRICS & GYNECOLOGY

## 2022-03-15 RX ORDER — CHLORTHALIDONE 25 MG/1
25 TABLET ORAL DAILY
COMMUNITY
Start: 2021-04-12 | End: 2022-06-01

## 2022-03-15 RX ORDER — POTASSIUM CHLORIDE 20 MEQ/1
20 TABLET, EXTENDED RELEASE ORAL DAILY
COMMUNITY
Start: 2021-06-07 | End: 2022-04-01 | Stop reason: SDUPTHER

## 2022-03-15 NOTE — PROGRESS NOTES
Annual exam ages [age]    Nicholas López is a [address] [facility],  31 y.o. female   Patient's last menstrual period was 03/12/2022. She presents for her annual checkup. She is having no significant problems. With regard to the Gardasil vaccine, she has not received it yet. Had chlamydia 2 months ago treated. BV as well. Menstrual status:    Her periods are absent from nexplanon in flow. She is using many pads or tampons per day, no. She does not have dysmenorrhea. She reports no premenstrual symptoms. Contraception:    The current method of family planning is nexplanon. Sexual history:    She  reports being sexually active and has had partner(s) who are male. She reports using the following method of birth control/protection: Implant. Medical conditions:    Since her last annual GYN exam about one year ago, she has not the following changes in her health history: none. Surgical history confirmed with patient. has a past surgical history that includes hx thyroidectomy (07/2021). Pap and Mammogram History:    Her most recent Pap smear was normal, obtained 1 year(s) ago.     The patient has never had a mammogram.    Breast Cancer History/Substance Abuse: negative  Patient Active Problem List   Diagnosis Code    Menorrhagia, premenopausal N92.4    Morbid obesity with BMI of 40.0-44.9, adult ([facility] [address] E66.01, Z68.41    Hypertension I10    Edema of lower extremity R60.0    Cigarette smoker F17.210    CERVANTES (dyspnea on exertion) R06.00    IGT (impaired glucose tolerance) R73.02    Thyroid nodule E04.1    Hip pain, chronic, right M25.551, G89.29    Multinodular goiter E04.2    Chlamydia A74.9     Past Medical History:   Diagnosis Date    Anemia 01/2012    8.9  7/13 7.6    Atypical squamous cells of undetermined significance (ASCUS) on Papanicolaou smear of cervix 09/2013    Cervical high risk HPV (human papillomavirus) test positive 04/2021    TYPE 18    Chlamydia 2004 2015, ,  2022    Chronic bronchitis ([facility] [address]     Cigarette smoker     CERVANTES (dyspnea on exertion) 2011    Edema of lower extremity 2011    Herpes genitalis     Hypertension     IGT (impaired glucose tolerance) 2019    Morbid obesity with BMI of 40.0-44.9, adult ([facility] [address]     Respiratory abnormalities     bronchitis    Thyroid nodule 2019    Consistent with a benign follicular nodule (includes adenomatoid nodule    Trichomonas vaginitis     Twin pregnancy 2012    di di    Vitamin D deficiency 2012    13.2     Past Surgical History:   Procedure Laterality Date    HX THYROIDECTOMY  2021    Large goiter couldn't swallow. OB History    Para Term  AB Living   7 6 5 1 1 7   SAB IAB Ectopic Molar Multiple Live Births   1 0 0 0 1 7      # Outcome Date GA Lbr Kirill/2nd Weight Sex Delivery Anes PTL Lv   7 Term 12/01/15 38w0d  7 lb 3 oz (3.26 kg) F Vag-Spont EPI  JEY   6 Term 13 39w0d  8 lb 1 oz (3.657 kg) F Vag-Spont EPI  JEY   5A  12 35w0d  5 lb 5 oz (2.41 kg) M Vag-Spont   JEY      Birth Comments: Twins      Complications: Preeclampsia   5B  12 35w0d  4 lb 15 oz (2.24 kg) M Vag-Spont   JEY      Birth Comments: Twins      Complications: Preeclampsia   4 Term 09 39w0d  7 lb 11 oz (3.487 kg) F Vag-Spont EPI  JEY   3 Term 07 40w0d  7 lb 11 oz (3.487 kg) F Vag-Spont EPI  JEY   2 Term 08/10/05 40w0d  6 lb (2.722 kg) F Vag-Spont EPI  JEY   1 SAB              No flowsheet data found.   Social History     Socioeconomic History    Marital status: SINGLE   Tobacco Use    Smoking status: Current Every Day Smoker     Packs/day: 0.25     Years: 19.00     Pack years: 4.75    Smokeless tobacco: Never Used   Vaping Use    Vaping Use: Never used   Substance and Sexual Activity    Alcohol use: Yes     Comment: occasionally    Drug use: No    Sexual activity: Yes     Partners: Male     Birth control/protection: Implant   Social History Narrative    Habits:  Smokes now down to a cigarette a day, she is trying to stop. She is a social drinker. She occasionally uses marijuana.         Social History:  The patient is single, her mom lives with her. She has seven children ages 3-16, two sons and five daughters. She completed high school and is gainfully employed for [email or web address]. She has no Denominational background.         Family History:  Father is unknown. Mother is 54 with RA. One brother, one sister, alive and well. Family History   Problem Relation Age of Onset    HIV/AIDS Mother    [place] Arthritis-rheumatoid Mother     Diabetes Maternal Grandmother     Hypertension Maternal Grandmother     Anesth Problems Neg Hx      Current Outpatient Medications on File Prior to Visit   Medication Sig Dispense Refill    chlorthalidone (HYGROTON) 25 mg tablet Take 25 mg by mouth daily.  etonogestreL 68 mg impl by SubCUTAneous route.  potassium chloride (K-DUR, KLOR-CON M20) 20 mEq tablet Take 20 mEq by mouth daily.  cephALEXin (Keflex) 500 mg capsule Take 1 Capsule by mouth two (2) times a day. 10 Capsule 0    cephALEXin (Keflex) 500 mg capsule Take 1 Capsule by mouth two (2) times a day. 10 Capsule 0    potassium chloride (K-DUR, KLOR-CON M20) 20 mEq tablet Take 1 Tablet by mouth daily. Appointment required for future refills 30 Tablet 5    calcitRIOL (ROCALTROL) 0.25 mcg capsule Take 1 Capsule by mouth daily. 16 Capsule 2    calcium-vitamin D (OS-LAYLA +D3) 500 mg-200 unit per tablet Take 1 Tablet by mouth two (2) times daily (with meals). 30 Tablet 1    levothyroxine (SYNTHROID) 150 mcg tablet Take 1 Tablet by mouth daily. 90 Tablet 2    B.infantis-B.ani-B.long-B.bifi (Probiotic 4X) 10-15 mg TbEC Take  by mouth daily.  vit A/vit C/biotin/zinc/copper (HAIR-SKIN-NAIL,VIT A,C-BIOTIN, PO) Take  by mouth daily.       chlorthalidone (HYGROTON) 25 mg tablet TAKE 1 TABLET BY MOUTH DAILY 60 Tablet 6    albuterol sulfate (PROAIR RESPICLICK) 90 mcg/actuation breath activated inhaler Take 4-6 Puffs by inhalation every four (4) hours as needed for Wheezing. 1 Inhaler 0    ferrous sulfate (IRON) 325 mg (65 mg iron) tablet Take  by mouth Daily (before breakfast).  etonogestrel (NEXPLANON) 68 mg impl by SubDERmal route. Birth control- placed feb 2019-good for 3 years       No current facility-administered medications on file prior to visit.      No Known Allergies    Review of Systems - History obtained from the patient-negative for:  Constitutional: weight loss, fever, night sweats  HEENT: hearing loss, earache, congestion, snoring, sorethroat  CV: chest pain, palpitations, edema  Resp: cough, shortness of breath, wheezing  Breast: breast lumps, nipple discharge, galactorrhea  GI: change in bowel habits, abdominal pain, black or bloody stools  : frequency, dysuria, hematuria, vaginal discharge  MSK: back pain, joint pain, muscle pain  Skin: itching, rash, hives  Neuro: dizziness, headache, confusion, weakness  Psych: anxiety, depression, change in mood  Heme/lymph: bleeding, bruising, pallor    Physical Exam    Visit Vitals  BP (!) 151/109   Wt 266 lb 6.4 oz (120.8 kg)   LMP 03/12/2022   BMI 44.33 kg/m²       Constitutional  · Appearance: well-nourished, well developed, alert, in no acute distress    HENT  · Head and Face: appears normal    Neck  · Inspection/Palpation: normal appearance, no masses or tenderness  · Lymph Nodes: no lymphadenopathy present  · Thyroid: gland size normal, nontender, no nodules or masses present on palpation    Chest  · Respiratory Effort: breathing unlabored  · Auscultation: normal breath sounds    Cardiovascular  · Heart:  · Auscultation: regular rate and rhythm without murmur    Breasts  · Inspection of Breasts: breasts symmetrical, no skin changes, no discharge present, nipple appearance normal, no skin retraction present  · Palpation of Breasts and Axillae: no masses present on palpation, no breast tenderness  · Axillary Lymph Nodes: no lymphadenopathy present    Gastrointestinal  · Abdominal Examination: abdomen non-tender to palpation, normal bowel sounds, no masses present  · Liver and spleen: no hepatomegaly present, spleen not palpable  · Hernias: no hernias identified    Genitourinary  · External Genitalia: normal appearance for age, no discharge present, no tenderness present, no inflammatory lesions present, no masses present, no atrophy present  · Vagina: normal vaginal vault without central or paravaginal defects, no discharge present, no inflammatory lesions present, no masses present  · Bladder: non-tender to palpation  · Urethra: appears normal  · Cervix: normal   · Uterus: normal size, shape and consistency, mildly tender  · Adnexa: no adnexal tenderness present, no adnexal masses present  · Perineum: perineum within normal limits, no evidence of trauma, no rashes or skin lesions present  · Anus: anus within normal limits, no hemorrhoids present  · Inguinal Lymph Nodes: no lymphadenopathy present    Skin  · General Inspection: no rash, no lesions identified    Neurologic/Psychiatric  · Mental Status:  · Orientation: grossly oriented to person, place and time  · Mood and Affect: mood normal, affect appropriate    . Assessment:  Routine gynecologic examination  Her current medical status is satisfactory with no evidence of significant gynecologic issues. Recent dx chlamydia. Will recheck today. Nexplanon in place and expiring now. Has some faith (several months) to get changed out. Plan:  Counseled re: diet, exercise, healthy lifestyle  Return for yearly wellness visits  Gardisil counseling provided  Pt counseled regarding co-testing for high risk HPV with pap  Rec screening mammo at either 37 or 42  Needs to see PCP asap RE: BP  Safe sex practices discussed.    See Sander Howard NP re: Nexplanon removal

## 2022-03-18 PROBLEM — E04.2 MULTINODULAR GOITER: Status: ACTIVE | Noted: 2021-07-30

## 2022-03-19 PROBLEM — R73.02 IGT (IMPAIRED GLUCOSE TOLERANCE): Status: ACTIVE | Noted: 2019-05-07

## 2022-03-19 PROBLEM — M25.551 HIP PAIN, CHRONIC, RIGHT: Status: ACTIVE | Noted: 2020-09-15

## 2022-03-19 PROBLEM — G89.29 HIP PAIN, CHRONIC, RIGHT: Status: ACTIVE | Noted: 2020-09-15

## 2022-03-19 LAB
C TRACH RRNA CVX QL NAA+PROBE: NEGATIVE
CYTOLOGIST CVX/VAG CYTO: NORMAL
CYTOLOGY CVX/VAG DOC CYTO: NORMAL
CYTOLOGY CVX/VAG DOC THIN PREP: NORMAL
CYTOLOGY HISTORY:: NORMAL
DX ICD CODE: NORMAL
HPV I/H RISK 4 DNA CVX QL PROBE+SIG AMP: NEGATIVE
Lab: NORMAL
N GONORRHOEA RRNA CVX QL NAA+PROBE: NEGATIVE
OTHER STN SPEC: NORMAL
STAT OF ADQ CVX/VAG CYTO-IMP: NORMAL
T VAGINALIS RRNA SPEC QL NAA+PROBE: NEGATIVE

## 2022-03-20 PROBLEM — E04.1 THYROID NODULE: Status: ACTIVE | Noted: 2019-05-17

## 2022-03-20 PROBLEM — N92.4 MENORRHAGIA, PREMENOPAUSAL: Status: ACTIVE | Noted: 2019-05-09

## 2022-04-01 ENCOUNTER — OFFICE VISIT (OUTPATIENT)
Dept: INTERNAL MEDICINE CLINIC | Age: 37
End: 2022-04-01
Payer: COMMERCIAL

## 2022-04-01 VITALS
SYSTOLIC BLOOD PRESSURE: 137 MMHG | BODY MASS INDEX: 44.25 KG/M2 | OXYGEN SATURATION: 100 % | RESPIRATION RATE: 16 BRPM | TEMPERATURE: 98.6 F | WEIGHT: 265.6 LBS | HEART RATE: 71 BPM | HEIGHT: 65 IN | DIASTOLIC BLOOD PRESSURE: 90 MMHG

## 2022-04-01 DIAGNOSIS — F17.210 CIGARETTE SMOKER: ICD-10-CM

## 2022-04-01 DIAGNOSIS — E66.01 MORBID OBESITY WITH BMI OF 40.0-44.9, ADULT (HCC): ICD-10-CM

## 2022-04-01 DIAGNOSIS — R73.02 IGT (IMPAIRED GLUCOSE TOLERANCE): ICD-10-CM

## 2022-04-01 DIAGNOSIS — E89.0 H/O TOTAL THYROIDECTOMY: ICD-10-CM

## 2022-04-01 DIAGNOSIS — I10 PRIMARY HYPERTENSION: Primary | ICD-10-CM

## 2022-04-01 DIAGNOSIS — F41.8 ANXIETY WITH DEPRESSION: ICD-10-CM

## 2022-04-01 PROBLEM — Z90.89 H/O TOTAL THYROIDECTOMY: Status: ACTIVE | Noted: 2021-07-21

## 2022-04-01 PROBLEM — Z98.890 H/O TOTAL THYROIDECTOMY: Status: ACTIVE | Noted: 2021-07-21

## 2022-04-01 PROCEDURE — 99214 OFFICE O/P EST MOD 30 MIN: CPT | Performed by: INTERNAL MEDICINE

## 2022-04-01 RX ORDER — POTASSIUM CHLORIDE 20 MEQ/1
20 TABLET, EXTENDED RELEASE ORAL DAILY
Qty: 30 TABLET | Refills: 11 | Status: SHIPPED | OUTPATIENT
Start: 2022-04-01 | End: 2022-04-02

## 2022-04-01 NOTE — PROGRESS NOTES
Chief Complaint   Patient presents with    Complete Physical     1. Have you been to the ER, urgent care clinic since your last visit? Hospitalized since your last visit? No    2. Have you seen or consulted any other health care providers outside of the [address] [place] [address] since your last visit? Include any pap smears or colon screening.  No
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
## 2022-04-02 LAB
25(OH)D3 SERPL-MCNC: <9 NG/ML (ref 30–100)
ALBUMIN SERPL-MCNC: 4 G/DL (ref 3.5–5)
ANION GAP SERPL CALC-SCNC: 6 MMOL/L (ref 5–15)
BASOPHILS # BLD: 0 K/UL (ref 0–0.1)
BASOPHILS NFR BLD: 0 % (ref 0–1)
BUN SERPL-MCNC: 14 MG/DL (ref 6–20)
BUN/CREAT SERPL: 16 (ref 12–20)
CALCIUM SERPL-MCNC: 9.3 MG/DL (ref 8.5–10.1)
CHLORIDE SERPL-SCNC: 101 MMOL/L (ref 97–108)
CO2 SERPL-SCNC: 33 MMOL/L (ref 21–32)
CREAT SERPL-MCNC: 0.85 MG/DL (ref 0.55–1.02)
DIFFERENTIAL METHOD BLD: NORMAL
EOSINOPHIL # BLD: 0.1 K/UL (ref 0–0.4)
EOSINOPHIL NFR BLD: 2 % (ref 0–7)
ERYTHROCYTE [DISTWIDTH] IN BLOOD BY AUTOMATED COUNT: 13.6 % (ref 11.5–14.5)
EST. AVERAGE GLUCOSE BLD GHB EST-MCNC: 128 MG/DL
GLUCOSE SERPL-MCNC: 106 MG/DL (ref 65–100)
HBA1C MFR BLD: 6.1 % (ref 4–5.6)
HCT VFR BLD AUTO: 38.9 % (ref 35–47)
HCV AB SERPL QL IA: NONREACTIVE
HGB BLD-MCNC: 12.1 G/DL (ref 11.5–16)
IMM GRANULOCYTES # BLD AUTO: 0 K/UL (ref 0–0.04)
IMM GRANULOCYTES NFR BLD AUTO: 0 % (ref 0–0.5)
LYMPHOCYTES # BLD: 2.4 K/UL (ref 0.8–3.5)
LYMPHOCYTES NFR BLD: 34 % (ref 12–49)
MCH RBC QN AUTO: 28.9 PG (ref 26–34)
MCHC RBC AUTO-ENTMCNC: 31.1 G/DL (ref 30–36.5)
MCV RBC AUTO: 92.8 FL (ref 80–99)
MONOCYTES # BLD: 0.4 K/UL (ref 0–1)
MONOCYTES NFR BLD: 6 % (ref 5–13)
NEUTS SEG # BLD: 4 K/UL (ref 1.8–8)
NEUTS SEG NFR BLD: 58 % (ref 32–75)
NRBC # BLD: 0 K/UL (ref 0–0.01)
NRBC BLD-RTO: 0 PER 100 WBC
PHOSPHATE SERPL-MCNC: 2.9 MG/DL (ref 2.6–4.7)
PLATELET # BLD AUTO: 267 K/UL (ref 150–400)
PMV BLD AUTO: 10.7 FL (ref 8.9–12.9)
POTASSIUM SERPL-SCNC: 3 MMOL/L (ref 3.5–5.1)
RBC # BLD AUTO: 4.19 M/UL (ref 3.8–5.2)
SODIUM SERPL-SCNC: 140 MMOL/L (ref 136–145)
T4 FREE SERPL-MCNC: 1.3 NG/DL (ref 0.8–1.5)
TSH SERPL DL<=0.05 MIU/L-ACNC: 2.02 UIU/ML (ref 0.36–3.74)
WBC # BLD AUTO: 7 K/UL (ref 3.6–11)

## 2022-04-02 RX ORDER — POTASSIUM CHLORIDE 20 MEQ/1
20 TABLET, EXTENDED RELEASE ORAL 2 TIMES DAILY
Qty: 60 TABLET | Refills: 11 | Status: SHIPPED | OUTPATIENT
Start: 2022-04-02

## 2022-04-02 RX ORDER — ERGOCALCIFEROL 1.25 MG/1
50000 CAPSULE ORAL
Qty: 4 CAPSULE | Refills: 2 | Status: SHIPPED | OUTPATIENT
Start: 2022-04-02 | End: 2022-07-01

## 2022-04-02 NOTE — PROGRESS NOTES
Your laboratory studies are good with the following exceptions: Your potassium was low and I suggest you increase your potassium to 20 mEq twice a day. Your vitamin D level was low and I suggest we start taking vitamin D until the numbers above 20.

## 2022-04-04 ENCOUNTER — PATIENT MESSAGE (OUTPATIENT)
Dept: INTERNAL MEDICINE CLINIC | Age: 37
End: 2022-04-04

## 2022-05-23 RX ORDER — LEVOTHYROXINE SODIUM 150 UG/1
150 TABLET ORAL DAILY
Qty: 90 TABLET | Refills: 2 | Status: SHIPPED | OUTPATIENT
Start: 2022-05-23

## 2022-05-25 PROCEDURE — 99285 EMERGENCY DEPT VISIT HI MDM: CPT

## 2022-05-25 PROCEDURE — 93005 ELECTROCARDIOGRAM TRACING: CPT

## 2022-05-25 PROCEDURE — 96374 THER/PROPH/DIAG INJ IV PUSH: CPT

## 2022-05-25 PROCEDURE — 94640 AIRWAY INHALATION TREATMENT: CPT

## 2022-05-25 PROCEDURE — 96375 TX/PRO/DX INJ NEW DRUG ADDON: CPT

## 2022-05-25 PROCEDURE — 80053 COMPREHEN METABOLIC PANEL: CPT

## 2022-05-25 PROCEDURE — 36415 COLL VENOUS BLD VENIPUNCTURE: CPT

## 2022-05-25 PROCEDURE — 83880 ASSAY OF NATRIURETIC PEPTIDE: CPT

## 2022-05-25 PROCEDURE — 83690 ASSAY OF LIPASE: CPT

## 2022-05-25 PROCEDURE — 85025 COMPLETE CBC W/AUTO DIFF WBC: CPT

## 2022-05-26 ENCOUNTER — HOSPITAL ENCOUNTER (EMERGENCY)
Age: 37
Discharge: HOME | End: 2022-05-26
Attending: EMERGENCY MEDICINE
Payer: COMMERCIAL

## 2022-05-26 ENCOUNTER — APPOINTMENT (OUTPATIENT)
Dept: GENERAL RADIOLOGY | Age: 37
End: 2022-05-26
Attending: EMERGENCY MEDICINE
Payer: COMMERCIAL

## 2022-05-26 VITALS
DIASTOLIC BLOOD PRESSURE: 82 MMHG | SYSTOLIC BLOOD PRESSURE: 139 MMHG | RESPIRATION RATE: 14 BRPM | OXYGEN SATURATION: 100 % | HEART RATE: 76 BPM | WEIGHT: 267.64 LBS | BODY MASS INDEX: 43.01 KG/M2 | HEIGHT: 66 IN | TEMPERATURE: 98.1 F

## 2022-05-26 DIAGNOSIS — R07.89 CHEST WALL PAIN: Primary | ICD-10-CM

## 2022-05-26 DIAGNOSIS — K21.9 GASTROESOPHAGEAL REFLUX DISEASE, UNSPECIFIED WHETHER ESOPHAGITIS PRESENT: ICD-10-CM

## 2022-05-26 DIAGNOSIS — J81.0 ACUTE PULMONARY EDEMA (HCC): ICD-10-CM

## 2022-05-26 LAB
ALBUMIN SERPL-MCNC: 3.6 G/DL (ref 3.5–5)
ALBUMIN/GLOB SERPL: 0.8 {RATIO} (ref 1.1–2.2)
ALP SERPL-CCNC: 60 U/L (ref 45–117)
ALT SERPL-CCNC: 34 U/L (ref 12–78)
ANION GAP SERPL CALC-SCNC: 3 MMOL/L (ref 5–15)
AST SERPL-CCNC: 20 U/L (ref 15–37)
ATRIAL RATE: 75 BPM
BASOPHILS # BLD: 0 K/UL (ref 0–0.1)
BASOPHILS NFR BLD: 0 % (ref 0–1)
BILIRUB SERPL-MCNC: 0.2 MG/DL (ref 0.2–1)
BNP SERPL-MCNC: 53 PG/ML
BUN SERPL-MCNC: 12 MG/DL (ref 6–20)
BUN/CREAT SERPL: 15 (ref 12–20)
CALCIUM SERPL-MCNC: 9.3 MG/DL (ref 8.5–10.1)
CALCULATED P AXIS, ECG09: 38 DEGREES
CALCULATED R AXIS, ECG10: 80 DEGREES
CALCULATED T AXIS, ECG11: 33 DEGREES
CHLORIDE SERPL-SCNC: 102 MMOL/L (ref 97–108)
CO2 SERPL-SCNC: 32 MMOL/L (ref 21–32)
CREAT SERPL-MCNC: 0.78 MG/DL (ref 0.55–1.02)
DIAGNOSIS, 93000: NORMAL
DIFFERENTIAL METHOD BLD: NORMAL
EOSINOPHIL # BLD: 0.3 K/UL (ref 0–0.4)
EOSINOPHIL NFR BLD: 4 % (ref 0–7)
ERYTHROCYTE [DISTWIDTH] IN BLOOD BY AUTOMATED COUNT: 13.3 % (ref 11.5–14.5)
GLOBULIN SER CALC-MCNC: 4.4 G/DL (ref 2–4)
GLUCOSE SERPL-MCNC: 120 MG/DL (ref 65–100)
HCT VFR BLD AUTO: 36.2 % (ref 35–47)
HGB BLD-MCNC: 11.9 G/DL (ref 11.5–16)
IMM GRANULOCYTES # BLD AUTO: 0 K/UL (ref 0–0.04)
IMM GRANULOCYTES NFR BLD AUTO: 0 % (ref 0–0.5)
LIPASE SERPL-CCNC: 166 U/L (ref 73–393)
LYMPHOCYTES # BLD: 3.2 K/UL (ref 0.8–3.5)
LYMPHOCYTES NFR BLD: 46 % (ref 12–49)
MCH RBC QN AUTO: 28.9 PG (ref 26–34)
MCHC RBC AUTO-ENTMCNC: 32.9 G/DL (ref 30–36.5)
MCV RBC AUTO: 87.9 FL (ref 80–99)
MONOCYTES # BLD: 0.5 K/UL (ref 0–1)
MONOCYTES NFR BLD: 7 % (ref 5–13)
NEUTS SEG # BLD: 3 K/UL (ref 1.8–8)
NEUTS SEG NFR BLD: 43 % (ref 32–75)
NRBC # BLD: 0 K/UL (ref 0–0.01)
NRBC BLD-RTO: 0 PER 100 WBC
P-R INTERVAL, ECG05: 214 MS
PLATELET # BLD AUTO: 257 K/UL (ref 150–400)
PMV BLD AUTO: 10 FL (ref 8.9–12.9)
POTASSIUM SERPL-SCNC: 3.2 MMOL/L (ref 3.5–5.1)
PROT SERPL-MCNC: 8 G/DL (ref 6.4–8.2)
Q-T INTERVAL, ECG07: 396 MS
QRS DURATION, ECG06: 86 MS
QTC CALCULATION (BEZET), ECG08: 442 MS
RBC # BLD AUTO: 4.12 M/UL (ref 3.8–5.2)
SODIUM SERPL-SCNC: 137 MMOL/L (ref 136–145)
TROPONIN-HIGH SENSITIVITY: 38 NG/L (ref 0–51)
TROPONIN-HIGH SENSITIVITY: 39 NG/L (ref 0–51)
VENTRICULAR RATE, ECG03: 75 BPM
WBC # BLD AUTO: 7 K/UL (ref 3.6–11)

## 2022-05-26 PROCEDURE — 71046 X-RAY EXAM CHEST 2 VIEWS: CPT

## 2022-05-26 PROCEDURE — 96375 TX/PRO/DX INJ NEW DRUG ADDON: CPT

## 2022-05-26 PROCEDURE — C9113 INJ PANTOPRAZOLE SODIUM, VIA: HCPCS | Performed by: EMERGENCY MEDICINE

## 2022-05-26 PROCEDURE — 84484 ASSAY OF TROPONIN QUANT: CPT

## 2022-05-26 PROCEDURE — 74011000250 HC RX REV CODE- 250: Performed by: EMERGENCY MEDICINE

## 2022-05-26 PROCEDURE — 74011250637 HC RX REV CODE- 250/637: Performed by: EMERGENCY MEDICINE

## 2022-05-26 PROCEDURE — 74011250637 HC RX REV CODE- 250/637

## 2022-05-26 PROCEDURE — 96374 THER/PROPH/DIAG INJ IV PUSH: CPT

## 2022-05-26 PROCEDURE — 74011250636 HC RX REV CODE- 250/636: Performed by: EMERGENCY MEDICINE

## 2022-05-26 RX ORDER — METHOCARBAMOL 750 MG/1
750 TABLET, FILM COATED ORAL ONCE
Status: COMPLETED | OUTPATIENT
Start: 2022-05-26 | End: 2022-05-26

## 2022-05-26 RX ORDER — METHOCARBAMOL 750 MG/1
750 TABLET, FILM COATED ORAL 3 TIMES DAILY
Qty: 9 TABLET | Refills: 0 | Status: SHIPPED | OUTPATIENT
Start: 2022-05-26 | End: 2022-05-29

## 2022-05-26 RX ORDER — FUROSEMIDE 40 MG/1
40 TABLET ORAL DAILY
Qty: 7 TABLET | Refills: 0 | Status: SHIPPED | OUTPATIENT
Start: 2022-05-26 | End: 2022-05-26 | Stop reason: SDUPTHER

## 2022-05-26 RX ORDER — KETOROLAC TROMETHAMINE 30 MG/ML
30 INJECTION, SOLUTION INTRAMUSCULAR; INTRAVENOUS
Status: COMPLETED | OUTPATIENT
Start: 2022-05-26 | End: 2022-05-26

## 2022-05-26 RX ORDER — FUROSEMIDE 40 MG/1
20 TABLET ORAL
Status: COMPLETED | OUTPATIENT
Start: 2022-05-26 | End: 2022-05-26

## 2022-05-26 RX ORDER — FAMOTIDINE 20 MG/1
20 TABLET, FILM COATED ORAL 2 TIMES DAILY
Qty: 20 TABLET | Refills: 0 | Status: SHIPPED | OUTPATIENT
Start: 2022-05-26 | End: 2022-05-26 | Stop reason: SDUPTHER

## 2022-05-26 RX ORDER — FUROSEMIDE 40 MG/1
40 TABLET ORAL DAILY
Qty: 7 TABLET | Refills: 0 | Status: SHIPPED | OUTPATIENT
Start: 2022-05-26 | End: 2022-06-02

## 2022-05-26 RX ORDER — ALBUTEROL SULFATE 90 UG/1
2 AEROSOL, METERED RESPIRATORY (INHALATION) ONCE
Status: COMPLETED | OUTPATIENT
Start: 2022-05-26 | End: 2022-05-26

## 2022-05-26 RX ORDER — ALBUTEROL SULFATE 90 UG/1
AEROSOL, METERED RESPIRATORY (INHALATION)
Status: COMPLETED
Start: 2022-05-26 | End: 2022-05-26

## 2022-05-26 RX ORDER — METHOCARBAMOL 750 MG/1
750 TABLET, FILM COATED ORAL 3 TIMES DAILY
Qty: 9 TABLET | Refills: 0 | Status: SHIPPED | OUTPATIENT
Start: 2022-05-26 | End: 2022-05-26 | Stop reason: SDUPTHER

## 2022-05-26 RX ORDER — FAMOTIDINE 20 MG/1
20 TABLET, FILM COATED ORAL 2 TIMES DAILY
Qty: 20 TABLET | Refills: 0 | Status: SHIPPED | OUTPATIENT
Start: 2022-05-26 | End: 2022-06-05

## 2022-05-26 RX ADMIN — LIDOCAINE HYDROCHLORIDE 40 ML: 20 SOLUTION ORAL; TOPICAL at 05:22

## 2022-05-26 RX ADMIN — ALBUTEROL SULFATE 2 PUFF: 90 AEROSOL, METERED RESPIRATORY (INHALATION) at 05:17

## 2022-05-26 RX ADMIN — METHOCARBAMOL TABLETS 750 MG: 750 TABLET, COATED ORAL at 05:17

## 2022-05-26 RX ADMIN — KETOROLAC TROMETHAMINE 30 MG: 30 INJECTION, SOLUTION INTRAMUSCULAR at 05:21

## 2022-05-26 RX ADMIN — FUROSEMIDE 20 MG: 40 TABLET ORAL at 05:16

## 2022-05-26 RX ADMIN — SODIUM CHLORIDE 40 MG: 9 INJECTION, SOLUTION INTRAMUSCULAR; INTRAVENOUS; SUBCUTANEOUS at 05:21

## 2022-05-26 NOTE — ED TRIAGE NOTES
Patient presents to triage ambulatory complaining of mid sternal chest pain that radiates into her abdomen. Patient reports having a previous episode of chest pain in the past which resulted in stress test. Patient can not recall the results on the testing at this time. Patient denies any nausea, vomiting, fever or chills. Patient states the pain started earlier today however resolved, then while at work tonight the pain returned.

## 2022-05-26 NOTE — ED PROVIDER NOTES
EMERGENCY DEPARTMENT HISTORY AND PHYSICAL EXAM    Please note that this dictation was completed with [employer], the computer voice recognition software. Quite often unanticipated grammatical, syntax, homophones, and other interpretive errors are inadvertently transcribed by the computer software. Please disregard these errors. Please excuse any errors that have escaped final proofreading. Date: 5/26/2022  Patient Name: Andrea Matos  Patient Age and Sex: 31 y.o. female    History of Presenting Illness     Chief Complaint   Patient presents with    Chest Pain       History Provided By: Patient     Chief Complaint: chest pain      HPI: Andrea Matos, is a 31 y.o. female who presents to the ED with substernal and epigastric chest wall pain that is worse with movement or twisting her upper body. She has also noticed that at times it is triggered by eating. Pain is burning, sometimes sharp. Not radiating, not pleuritic. Started 2 days ago. Each episode lasts several minutes before subsiding. In addition to this, she has had a 3-4 day history of exertional sob. She has seasonal allergies and uses albuterol prn which has not really helped with her sob. Noticed around same timeframe increased swelling around her ankles that is particularly noticeable at night. No orthopnea, PND. No cough, fever, chills or sick contacts. Pt denies any other alleviating or exacerbating factors. No other associated signs or symptoms. There are no other complaints, changes or physical findings at this time.      PCP: Jose Molina MD    Past History   All documented elements of the [employer] [facility] [employer] reviewed and verified by me. -Birdie Obando MD    Past Medical History:  Past Medical History:   Diagnosis Date    Anemia 01/2012    8.9  7/13 7.6    Anxiety with depression     Atypical squamous cells of undetermined significance (ASCUS) on Papanicolaou smear of cervix 09/2013    Cervical high risk HPV (human papillomavirus) test positive 04/2021    TYPE 18    Chlamydia 2004    2015,  2018,  1/2022    Chronic bronchitis ([facility] [address]     Cigarette smoker     CERVANTES (dyspnea on exertion) 06/29/2011    Edema of lower extremity 06/29/2011    H/O total thyroidectomy 07/21/2021    Parmjit Avendano MD, - Papillary microcarcinoma    Herpes genitalis 2008    Hypertension     IGT (impaired glucose tolerance) 05/07/2019    Morbid obesity with BMI of 40.0-44.9, adult ([facility] [address]     Respiratory abnormalities     bronchitis    Thyroid nodule 07/24/2019    Consistent with a benign follicular nodule (includes adenomatoid nodule    Trichomonas vaginitis 2012    Twin pregnancy 01/2012    di di    Vitamin D deficiency 01/2012    13.2       Past Surgical History:  Past Surgical History:   Procedure Laterality Date    HX THYROIDECTOMY  07/2021    Large goiter couldn't swallow. Family History:   Family History   Problem Relation Age of Onset    HIV/AIDS Mother    [facility] Ar[facility]ritis-rheumatoid Mother     Diabetes Maternal Grandmother     Hypertension Maternal Grandmother     Anesth Problems Neg Hx        Social History:  Social History     Tobacco Use    Smoking status: Current Every Day Smoker     Packs/day: 0.25     Years: 19.00     Pack years: 4.75    Smokeless tobacco: Never Used   Vaping Use    Vaping Use: Never used   Substance Use Topics    Alcohol use: Yes     Comment: occasionally    Drug use: No       Current Medications:  No current facility-administered medications on file prior to encounter. Current Outpatient Medications on File Prior to Encounter   Medication Sig Dispense Refill    levothyroxine (SYNTHROID) 150 mcg tablet Take 1 Tablet by mouth daily. 90 Tablet 2    potassium chloride (K-DUR, KLOR-CON M20) 20 mEq tablet Take 1 Tablet by mouth two (2) times a day. 60 Tablet 11    ergocalciferol (ERGOCALCIFEROL) 1,250 mcg (50,000 unit) capsule Take 1 Capsule by mouth every seven (7) days.  4 Capsule 2    albuterol sulfate (PROAIR RESPICLICK) 90 mcg/actuation breath activated inhaler Take 4-6 Puffs by inhalation every four (4) hours as needed for Wheezing. 1 Each 11    chlorthalidone (HYGROTON) 25 mg tablet Take 25 mg by mouth daily.  etonogestreL 68 mg impl by SubCUTAneous route. Allergies:  No Known Allergies    Review of Systems   All other systems reviewed and negative    Review of Systems   Constitutional: Negative for chills and fever. HENT: Negative. Eyes: Negative. Negative for visual disturbance. Respiratory: Positive for shortness of breath. Negative for cough. Cardiovascular: Positive for chest pain and leg swelling. Negative for palpitations. Gastrointestinal: Negative for abdominal pain, diarrhea, nausea and vomiting. Endocrine: Negative. Negative for polydipsia and polyuria. Genitourinary: Negative for dysuria, flank pain and hematuria. Musculoskeletal: Negative for gait problem, joint swelling and neck pain. Skin: Negative for pallor and rash. Neurological: Negative for weakness, numbness and headaches. Psychiatric/Behavioral: Negative. Negative for confusion. All other systems reviewed and are negative. Physical Exam   Reviewed patients vital signs and nursing note    Physical Exam  Vitals and nursing note reviewed. Constitutional:       Appearance: She is not diaphoretic. HENT:      Head: Atraumatic. Nose: Nose normal.      Mouth/Throat:      Mouth: Mucous membranes are moist.   Eyes:      Extraocular Movements: Extraocular movements intact. Conjunctiva/sclera: Conjunctivae normal.      Pupils: Pupils are equal, round, and reactive to light. Neck:      Vascular: No JVD. Cardiovascular:      Rate and Rhythm: Normal rate and regular rhythm. Pulses: Normal pulses. Heart sounds: Normal heart sounds. Pulmonary:      Effort: Pulmonary effort is normal. No accessory muscle usage. Breath sounds: No stridor or decreased air movement.  Examination of the right-upper field reveals wheezing. Examination of the left-upper field reveals wheezing. Examination of the right-middle field reveals wheezing. Examination of the left-middle field reveals wheezing. Wheezing present. No rhonchi or rales. Comments: Mild expiratory wheezes bilaterally  Chest:      Chest wall: Tenderness present. No crepitus or edema. Comments: No contusion, abrasion or other evidence of trauma. Mild tenderness to palpation over sternum. Abdominal:      General: Bowel sounds are normal.      Palpations: Abdomen is soft. Tenderness: There is no abdominal tenderness. Musculoskeletal:         General: No tenderness. Normal range of motion. Cervical back: Normal range of motion and neck supple. No rigidity. Right lower leg: No tenderness. Edema present. Left lower leg: No tenderness. Edema present. Comments: 1+ pitting edema to just above ankles bilaterally   Skin:     General: Skin is warm and dry. Capillary Refill: Capillary refill takes less than 2 seconds. Coloration: Skin is not cyanotic or pale. Findings: No ecchymosis. Neurological:      General: No focal deficit present. Mental Status: She is alert and oriented to person, place, and time. Psychiatric:         Mood and Affect: Mood normal.         Behavior: Behavior normal.         Diagnostic Study Results     Labs - I have personally reviewed and interpreted all laboratory results. Interpretation of available and pertinent results detailed below in MDM.  Cortez Cardenas MD, MSc  Recent Results (from the past 24 hour(s))   EKG, 12 LEAD, INITIAL    Collection Time: 05/25/22 11:37 PM   Result Value Ref Range    Ventricular Rate 75 BPM    Atrial Rate 75 BPM    P-R Interval 214 ms    QRS Duration 86 ms    Q-T Interval 396 ms    QTC Calculation (Bezet) 442 ms    Calculated P Axis 38 degrees    Calculated R Axis 80 degrees    Calculated T Axis 33 degrees    Diagnosis       Sinus rhythm with 1st degree AV block  Possible Left atrial enlargement  When compared with ECG of 22-JUL-2021 08:31,  No significant change was found     CBC WITH AUTOMATED DIFF    Collection Time: 05/25/22 11:42 PM   Result Value Ref Range    WBC 7.0 3.6 - 11.0 K/uL    RBC 4.12 3.80 - 5.20 M/uL    HGB 11.9 11.5 - 16.0 g/dL    HCT 36.2 35.0 - 47.0 %    MCV 87.9 80.0 - 99.0 FL    MCH 28.9 26.0 - 34.0 PG    MCHC 32.9 30.0 - 36.5 g/dL    RDW 13.3 11.5 - 14.5 %    PLATELET 464 544 - 160 K/uL    MPV 10.0 8.9 - 12.9 FL    NRBC 0.0 0  WBC    ABSOLUTE NRBC 0.00 0.00 - 0.01 K/uL    NEUTROPHILS 43 32 - 75 %    LYMPHOCYTES 46 12 - 49 %    MONOCYTES 7 5 - 13 %    EOSINOPHILS 4 0 - 7 %    BASOPHILS 0 0 - 1 %    IMMATURE GRANULOCYTES 0 0.0 - 0.5 %    ABS. NEUTROPHILS 3.0 1.8 - 8.0 K/UL    ABS. LYMPHOCYTES 3.2 0.8 - 3.5 K/UL    ABS. MONOCYTES 0.5 0.0 - 1.0 K/UL    ABS. EOSINOPHILS 0.3 0.0 - 0.4 K/UL    ABS. BASOPHILS 0.0 0.0 - 0.1 K/UL    ABS. IMM. GRANS. 0.0 0.00 - 0.04 K/UL    DF AUTOMATED     METABOLIC PANEL, COMPREHENSIVE    Collection Time: 05/25/22 11:42 PM   Result Value Ref Range    Sodium 137 136 - 145 mmol/L    Potassium 3.2 (L) 3.5 - 5.1 mmol/L    Chloride 102 97 - 108 mmol/L    CO2 32 21 - 32 mmol/L    Anion gap 3 (L) 5 - 15 mmol/L    Glucose 120 (H) 65 - 100 mg/dL    BUN 12 6 - 20 MG/DL    Creatinine 0.78 0.55 - 1.02 MG/DL    BUN/Creatinine ratio 15 12 - 20      GFR est AA >60 >60 ml/min/1.73m2    GFR est non-AA >60 >60 ml/min/1.73m2    Calcium 9.3 8.5 - 10.1 MG/DL    Bilirubin, total 0.2 0.2 - 1.0 MG/DL    ALT (SGPT) 34 12 - 78 U/L    AST (SGOT) 20 15 - 37 U/L    Alk.  phosphatase 60 45 - 117 U/L    Protein, total 8.0 6.4 - 8.2 g/dL    Albumin 3.6 3.5 - 5.0 g/dL    Globulin 4.4 (H) 2.0 - 4.0 g/dL    A-G Ratio 0.8 (L) 1.1 - 2.2     NT-PRO BNP    Collection Time: 05/25/22 11:42 PM   Result Value Ref Range    NT pro-BNP 53 <125 PG/ML   TROPONIN-HIGH SENSITIVITY    Collection Time: 05/25/22 11:42 PM   Result Value Ref Range Troponin-High Sensitivity 39 0 - 51 ng/L   LIPASE    Collection Time: 05/25/22 11:42 PM   Result Value Ref Range    Lipase 166 73 - 393 U/L   TROPONIN-HIGH SENSITIVITY    Collection Time: 05/26/22  2:59 AM   Result Value Ref Range    Troponin-High Sensitivity 38 0 - 51 ng/L       Radiologic Studies - I have personally reviewed and interpreted (see MDM for brief interpreation of available results) all imaging studies and agree with radiology interpretation and report. Bina Sheppard MD, MSc  XR CHEST PA LAT   Final Result      Atypical pattern of pulmonary edema is suggested. Medical Decision Making   I am the first provider for this patient. Records Reviewed:   I reviewed our electronic medical record system for any past medical records that were available that may contribute to the patient's current condition, including their PMH, surgical history, social and family history. This includes most recent ED visits, any available discharge summaries and prior ECGs, which I have reviewed and interpreted personally. I have summarized most salient findings in my HPI and MDM. Niraj Rasheed MD, MSc    I also reviewed the nursing notes and vital signs from today's visit. Nursing notes will be reviewed as they become available in realtime while the pt has been in the ED. Niraj Rasheed MD, MSc      Vital Signs-Reviewed the patient's vital signs. Patient Vitals for the past 24 hrs:   Temp Pulse Resp BP SpO2   05/26/22 0445 -- 78 13 (!) 142/87 99 %   05/26/22 0400 -- 67 18 127/88 100 %   05/26/22 0330 -- 73 14 135/86 95 %   05/26/22 0315 -- 64 15 (!) 136/91 100 %   05/26/22 0245 -- 70 17 (!) 151/111 100 %   05/26/22 0230 -- 76 19 (!) 159/125 100 %   05/25/22 2335 98.1 °F (36.7 °C) 77 18 (!) 163/116 100 %       ECG interpretation: Normal sinus rhythm with rate 75, Qtc is 442, 1st degree av block, and no changes concerning for acute ischemia. This ECG has been viewed and interpreted by me personally.  Beatriz Alexander Camron Cifuentes MD, Msc    Cardiac Monitor: Cardiac monitor interpreted by me. The cardiac monitor revealed normal sinus rhythm. The cardiac monitor was ordered to monitor patient for signs of cardiac dysrhythmia, which they are at risk for based on their history or risk for cardiovascular disease and/or metabolic abnormalities. Kamila Foy MD MSc    Pulse ox interpretation: 100% on RA with good pleth. Provider Notes (Medical Decision Making):   Assessment: 32 yo F presents with sob, LE edema, intermittent episodes of chest pain. Overall well appearing at this time, no distress. Exam notable for faint expiratory wheezes but good air movement throughout both lungs and normal o2 sat. Ddx: acs, chf, asthma flair, pulm edema, renal failure    Planned Workup and Intervention: cbc, cmp, trop x 2, bnp, cxr.    ED Course:   Initial assessment performed. The patients presenting problems have been discussed, and they are in agreement with the care plan formulated and outlined with them. I have encouraged them to ask questions as they arise throughout their visit. Progress note:  Patient has been reassessed and reports feeling considerably better, has normal vital signs and feels comfortable going home. I think this is reasonable as no findings today suggest a life-threatening condition. I personally reviewed and interpreted the patient's laboratory and imaging studies. She has no change in trop levels measured 2 hours apart and with atypical nature of cp that is more suggestive of msk etiology or GERD, I find acs unlikley. She had a normal stress test in 2019. Will try famotidine and a muscle relaxant. Close follow up with pmd.    Her cxr shows atypical pattern of possible edema. She does mention that the diuretic she is currently using no longer triggers good urine output. She is not sure what she is using. Will try low dose lasix for a few days.             TOBACCO COUNSELING:   Upon evaluation, pt expressed that they are an active tobacco user. For approximately 6 minutes, I counseled pt face-to-face on the dangers of smoking and encouraged quitting as soon as possible in order to decrease further risks to their health. I assessed their readiness to quit smoking and patient states they are actively thinking about it but the habit of smoking as well as euphoric effect of nicotine are barriers. I encouraged them to continue thinking about it and to set a quitting date. I provided specific suggestions on how to quit smoking, including CBT, support groups, acupuncture, medication assistance. Pt has conveyed their understanding of the risks involved should they continue to use tobacco products. DISPOSITION: DISCHARGE  The patient's results have been reviewed with patient and available family and/or caregiver. They verbally convey their understanding and agreement of the patient's signs, symptoms, diagnosis, treatment and prognosis and additionally agree to follow up as recommended in the discharge instructions or to return to the Emergency Department should the patient's condition change prior to their follow-up appointment. The patient and available family and/or caregiver verbally agree with the care plan and all of their questions have been answered. The discharge instructions have also been provided to the them with educational information regarding the patient's diagnosis as well a list of reasons why the patient would want to return to the ER prior to their follow-up appointment should any concerns arise, the patient's condition change or symptoms worsen. Anni Green MD, Msc    PLAN:  Discharge Medication List as of 5/26/2022  5:10 AM      CONTINUE these medications which have CHANGED    Details   famotidine (Pepcid) 20 mg tablet Take 1 Tablet by mouth two (2) times a day for 10 days. , Normal, Disp-20 Tablet, R-0      furosemide (Lasix) 40 mg tablet Take 1 Tablet by mouth daily for 7 days. , Normal, Disp-7 Tablet, R-0      methocarbamoL (Robaxin-750) 750 mg tablet Take 1 Tablet by mouth three (3) times daily for 3 days. , Normal, Disp-9 Tablet, R-0         CONTINUE these medications which have NOT CHANGED    Details   levothyroxine (SYNTHROID) 150 mcg tablet Take 1 Tablet by mouth daily. , Normal, Disp-90 Tablet, R-2      potassium chloride (K-DUR, KLOR-CON M20) 20 mEq tablet Take 1 Tablet by mouth two (2) times a day., Normal, Disp-60 Tablet, R-11      ergocalciferol (ERGOCALCIFEROL) 1,250 mcg (50,000 unit) capsule Take 1 Capsule by mouth every seven (7) days. , Normal, Disp-4 Capsule, R-2      albuterol sulfate (PROAIR RESPICLICK) 90 mcg/actuation breath activated inhaler Take 4-6 Puffs by inhalation every four (4) hours as needed for Wheezing., Normal, Disp-1 Each, R-11      chlorthalidone (HYGROTON) 25 mg tablet Take 25 mg by mouth daily. , Historical Med      etonogestreL 68 mg impl by SubCUTAneous route., Historical Med         1.   2.     Follow-up Information     Follow up With Specialties Details Why Contact Info    Osmar Ramon MD Internal Medicine Physician Schedule an appointment as soon as possible for a visit  3-5 days [id number] [address] an appointment as soon as possible for a visit  3-5 days [address] EMERGENCY DEPT Emergency Medicine Go to  As needed, If symptoms worsen [address] [place] [address] Drive  [address] [facility]  [phone]        3. Return to ED if worse       I, Reina Murry MD, am the attending of record for this patient encounter. Diagnosis     Clinical Impression:   1. Chest wall pain    2. Gastroesophageal reflux disease, unspecified whether esophagitis present    3. Acute pulmonary edema (HCC)        Attestation:  I personally performed the services described in this documentation on this date 5/26/2022 for patient Hayde Guerrero.   Anni Juan Ana Cruz MD

## 2022-05-26 NOTE — DISCHARGE INSTRUCTIONS
It was a pleasure taking care of you in our Emergency Department today. We know that when you come to [facility], you are entrusting us with your health, comfort, and safety. Our physicians and nurses honor that trust, and truly appreciate the opportunity to care for you and your loved ones. We also value your feedback. If you receive a survey about your Emergency Department experience today, please fill it out. We care about our patients' feedback, and we listen to what you have to say.   Thank you!       --- Dr. Rambo Cannon MD

## 2022-05-26 NOTE — ED NOTES
Discharge teaching reviewed with patient who voices understanding. No questions left to address at this time. Patient ambulatory to exit with discharge instructions and all belongings with safety maintained.

## 2022-06-01 RX ORDER — CHLORTHALIDONE 25 MG/1
TABLET ORAL
Qty: 60 TABLET | Refills: 3 | Status: SHIPPED | OUTPATIENT
Start: 2022-06-01 | End: 2022-07-01

## 2022-06-24 ENCOUNTER — TELEPHONE (OUTPATIENT)
Dept: OBGYN CLINIC | Age: 37
End: 2022-06-24

## 2022-06-24 NOTE — TELEPHONE ENCOUNTER
31year old patient last seen in the office on 3/15/2022     Patient reports she had nexplanon inserted in April of 2022 ? Patient reports she had spotting on and off for the past 5 weeks and that the bleeding increases at night and with  activityand she is wondering how to proceed    Pharmacy confirmed        Patient advised that will nexplanon she can have some break through bleeding    Please advise  ?  Ov    Thank you

## 2022-06-24 NOTE — TELEPHONE ENCOUNTER
MD Ivy Beck Lytle Edouard, RN  Caller: Unspecified (Today,  9:35 AM)  Yes that is normal with nexplanon.  Happy to see her non emergent   This nurse attempted to reach the patient and left a detailed message regarding MD recommendations

## 2022-06-24 NOTE — TELEPHONE ENCOUNTER
Patient advised of MD recommendations and desired to be seen for appointment and was placed on the schedule to be seen at 10:25am to arrive at 10:05 on 6/27/2022    Patient was provided directions  Patient verbalized understanding.

## 2022-06-27 ENCOUNTER — OFFICE VISIT (OUTPATIENT)
Dept: OBGYN CLINIC | Age: 37
End: 2022-06-27
Payer: COMMERCIAL

## 2022-06-27 VITALS
DIASTOLIC BLOOD PRESSURE: 103 MMHG | BODY MASS INDEX: 43.92 KG/M2 | HEIGHT: 65 IN | SYSTOLIC BLOOD PRESSURE: 151 MMHG | WEIGHT: 263.6 LBS

## 2022-06-27 DIAGNOSIS — N93.9 ABNORMAL UTERINE BLEEDING (AUB): Primary | ICD-10-CM

## 2022-06-27 PROCEDURE — 99214 OFFICE O/P EST MOD 30 MIN: CPT | Performed by: OBSTETRICS & GYNECOLOGY

## 2022-06-27 RX ORDER — ETHYNODIOL DIACETATE AND ETHINYL ESTRADIOL 1 MG-35MCG
1 KIT ORAL DAILY
Qty: 28 TABLET | Refills: 3 | Status: SHIPPED | OUTPATIENT
Start: 2022-06-27 | End: 2022-09-15

## 2022-06-27 NOTE — PROGRESS NOTES
PROBLEM VISIT COMPLETE    Chief Complaint   Vaginal Bleeding      HPI  Chen Nunez is a 31 y.o. female who presents for the evaluation of break through bleeding has Nexplanon. Britni inserted Nexplanon 4/2022. No LMP recorded. Patient has had an implant. The patient complains of vaginal bleeding since 5/20/2022. Feeling fatigued. The symptoms are heavy bleedinig first two weeks then spotting. Gets heavy when doing activities. They started on 5/20/2022.      Past Medical History:  Patient Active Problem List   Diagnosis Code    Menorrhagia, premenopausal N92.4    Morbid obesity with BMI of 40.0-44.9, adult ([address] E66.01, Z68.41    Hypertension I10    Edema of lower extremity R60.0    Cigarette smoker F17.210    CERVANTES (dyspnea on exertion) R06.00    IGT (impaired glucose tolerance) R73.02    Thyroid nodule E04.1    Hip pain, chronic, right M25.551, G89.29    Multinodular goiter E04.2    Chlamydia A74.9    H/O total thyroidectomy E89.0    Anxiety with depression F41.8     Past Medical History:   Diagnosis Date    Anemia 01/2012    8.9  7/13 7.6    Anxiety with depression     Atypical squamous cells of undetermined significance (ASCUS) on Papanicolaou smear of cervix 09/2013    Cervical high risk HPV (human papillomavirus) test positive 04/2021    TYPE 18    Chlamydia 2004    2015,  2018,  1/2022    [facility] bronch[facility] ([facility] [address]     Cigarette smoker     CERVANTES (dyspnea on exertion) 06/29/2011    Edema of lower extremity 06/29/2011    H/O total thyroidectomy 07/21/2021    Richard Wagoner MD, - Papillary microcarcinoma    Herpes genitalis 2008    Hypertension     IGT (impaired glucose tolerance) 05/07/2019    Morbid obesity with BMI of 40.0-44.9, adult ([address]     Respiratory abnormalities     bronchitis    Thyroid nodule 07/24/2019    Consistent with a benign follicular nodule (includes adenomatoid nodule    Trichomonas vaginitis 2012    Twin pregnancy 01/2012    di di    Vitamin D deficiency 2012    13.2     Past Surgical History:   Procedure Laterality Date    HX THYROIDECTOMY  2021    Large goiter couldn't swallow. OB History    Para Term  AB Living   7 6 5 1 1 7   SAB IAB Ectopic Molar Multiple Live Births   1 0 0 0 1 7      # Outcome Date GA Lbr Kirill/2nd Weight Sex Delivery Anes PTL Lv   7 Term 12/01/15 38w0d  7 lb 3 oz (3.26 kg) F Vag-Spont EPI  JEY   6 Term 13 39w0d  8 lb 1 oz (3.657 kg) F Vag-Spont EPI  JEY   5A  12 35w0d  5 lb 5 oz (2.41 kg) M Vag-Spont   JEY      Birth Comments: Twins      Complications: Preeclampsia   5B  12 35w0d  4 lb 15 oz (2.24 kg) M Vag-Spont   JEY      Birth Comments: Twins      Complications: Preeclampsia   4 Term 09 39w0d  7 lb 11 oz (3.487 kg) F Vag-Spont EPI  JEY   3 Term 07 40w0d  7 lb 11 oz (3.487 kg) F Vag-Spont EPI  JEY   2 Term 08/10/05 40w0d  6 lb (2.722 kg) F Vag-Spont EPI  JEY   1 SAB              Gyn Flowsheet:  No flowsheet data found. Social History     Socioeconomic History    Marital status: SINGLE    Number of children: 7   Tobacco Use    Smoking status: Current Every Day Smoker     Packs/day: 0.25     Years: 19.00     Pack years: 4.75    Smokeless tobacco: Never Used   Vaping Use    Vaping Use: Never used   Substance and Sexual Activity    Alcohol use: Yes     Comment: occasionally    Drug use: No    Sexual activity: Yes     Partners: Male     Birth control/protection: Implant   Social History Narrative    Habits:  Smokes now down to a cigarette a day, she is trying to stop. She is a social drinker. She occasionally uses marijuana.         Social History:  The patient is single, her mom lives with her. She has seven children ages 3-16, two sons and five daughters. She completed high school and is gainfully employed for [employer]. She has no Hindu background.         Family History:  Father is unknown. Mother is 54 with RA.   One brother, one sister, alive and well. Family History   Problem Relation Age of Onset    HIV/AIDS Mother    Nany Ugarte Arthritis-rheumatoid Mother     Diabetes Maternal Grandmother     Hypertension Maternal Grandmother     Anesth Problems Neg Hx      Current Outpatient Medications on File Prior to Visit   Medication Sig Dispense Refill    levothyroxine (SYNTHROID) 150 mcg tablet Take 1 Tablet by mouth daily. 90 Tablet 2    chlorthalidone (HYGROTON) 25 mg tablet TAKE 1 TABLET BY MOUTH EVERY DAY 60 Tablet 3    potassium chloride (K-DUR, KLOR-CON M20) 20 mEq tablet Take 1 Tablet by mouth two (2) times a day. 60 Tablet 11    ergocalciferol (ERGOCALCIFEROL) 1,250 mcg (50,000 unit) capsule Take 1 Capsule by mouth every seven (7) days. 4 Capsule 2    albuterol sulfate (PROAIR RESPICLICK) 90 mcg/actuation breath activated inhaler Take 4-6 Puffs by inhalation every four (4) hours as needed for Wheezing. 1 Each 11    etonogestreL 68 mg impl by SubCUTAneous route. No current facility-administered medications on file prior to visit.      No Known Allergies    Review of Systems:   History obtained from the patient-negative for:  Constitutional: weight loss, fever, night sweats  HEENT: hearing loss, earache, congestion, snoring, sorethroat  CV: chest pain, palpitations, edema  Resp: cough, shortness of breath, wheezing  Breast: breast lumps, nipple discharge, galactorrhea  GI: change in bowel habits, abdominal pain, black or bloody stools  : frequency, dysuria, hematuria, vaginal discharge  MSK: back pain, joint pain, muscle pain  Skin: itching, rash, hives  Neuro: dizziness, headache, confusion, weakness  Psych: anxiety, depression, change in mood  Heme/lymph: bleeding, bruising, pallor        Objective:  Visit Vitals  BP (!) 151/103   Ht 5' 5\" (1.651 m)   Wt 263 lb 9.6 oz (119.6 kg)   BMI 43.87 kg/m²       Physical Exam:     Constitutional  · Appearance: well-nourished, well developed, alert, in no acute distress    HENT  · Head and Face: appears normal    Neck  · Inspection/Palpation: normal appearance, no masses or tenderness  · Lymph Nodes: no lymphadenopathy present  · Thyroid: gland size normal, nontender, no nodules or masses present on palpation    Chest  · Respiratory Effort: breathing unlabored  · Auscultation: normal breath sounds    Cardiovascular  · Heart:  · Auscultation: regular rate and rhythm without murmur    Breasts  · Inspection of Breasts: breasts symmetrical, no skin changes, no discharge present, nipple appearance normal, no skin retraction present  · Palpation of Breasts and Axillae: no masses present on palpation, no breast tenderness  · Axillary Lymph Nodes: no lymphadenopathy present    Gastrointestinal  · Abdominal Examination: abdomen non-tender to palpation, normal bowel sounds, no masses present  · Liver and spleen: no hepatomegaly present, spleen not palpable  · Hernias: no hernias identified    Genitourinary  · External Genitalia: normal appearance for age, no discharge present, no tenderness present, no inflammatory lesions present, no masses present, no atrophy present  · Vagina: normal vaginal vault without central or paravaginal defects, no discharge present, no inflammatory lesions present, no masses present  · Bladder: non-tender to palpation  · Urethra: appears normal  · Cervix: normal   · Uterus: normal size, shape and consistency  · Adnexa: no adnexal tenderness present, no adnexal masses present  · Perineum: perineum within normal limits, no evidence of trauma, no rashes or skin lesions present  · Anus: anus within normal limits, no hemorrhoids present  · Inguinal Lymph Nodes: no lymphadenopathy present    Skin  · General Inspection: no rash, no lesions identified    Neurologic/Psychiatric  · Mental Status:  · Orientation: grossly oriented to person, place and time  · Mood and Affect: mood normal, affect appropriate    Assessment:  AUB on nexplnon.     Plan:   GC/Chl CBC  Schedule US  Try OCP for cycle control x3-4 cycles  If not better will try ishmael

## 2022-06-28 LAB
ERYTHROCYTE [DISTWIDTH] IN BLOOD BY AUTOMATED COUNT: 13.5 % (ref 11.5–14.5)
HCT VFR BLD AUTO: 36.4 % (ref 35–47)
HGB BLD-MCNC: 11.6 G/DL (ref 11.5–16)
MCH RBC QN AUTO: 29.4 PG (ref 26–34)
MCHC RBC AUTO-ENTMCNC: 31.9 G/DL (ref 30–36.5)
MCV RBC AUTO: 92.2 FL (ref 80–99)
NRBC # BLD: 0.03 K/UL (ref 0–0.01)
NRBC BLD-RTO: 0.6 PER 100 WBC
PLATELET # BLD AUTO: 229 K/UL (ref 150–400)
PMV BLD AUTO: 10.2 FL (ref 8.9–12.9)
RBC # BLD AUTO: 3.95 M/UL (ref 3.8–5.2)
WBC # BLD AUTO: 5.3 K/UL (ref 3.6–11)

## 2022-06-29 LAB
C TRACH RRNA CVX QL NAA+PROBE: NEGATIVE
N GONORRHOEA RRNA CVX QL NAA+PROBE: NEGATIVE

## 2022-07-01 ENCOUNTER — OFFICE VISIT (OUTPATIENT)
Dept: INTERNAL MEDICINE CLINIC | Age: 37
End: 2022-07-01
Payer: COMMERCIAL

## 2022-07-01 VITALS
OXYGEN SATURATION: 98 % | RESPIRATION RATE: 18 BRPM | DIASTOLIC BLOOD PRESSURE: 102 MMHG | BODY MASS INDEX: 44.02 KG/M2 | TEMPERATURE: 98.4 F | WEIGHT: 264.2 LBS | HEIGHT: 65 IN | HEART RATE: 80 BPM | SYSTOLIC BLOOD PRESSURE: 138 MMHG

## 2022-07-01 DIAGNOSIS — U09.9 COVID-19 LONG HAULER MANIFESTING CHRONIC LOSS OF SMELL AND TASTE: ICD-10-CM

## 2022-07-01 DIAGNOSIS — R73.02 IGT (IMPAIRED GLUCOSE TOLERANCE): ICD-10-CM

## 2022-07-01 DIAGNOSIS — I10 PRIMARY HYPERTENSION: Primary | ICD-10-CM

## 2022-07-01 DIAGNOSIS — R43.8 COVID-19 LONG HAULER MANIFESTING CHRONIC LOSS OF SMELL AND TASTE: ICD-10-CM

## 2022-07-01 DIAGNOSIS — E04.2 MULTINODULAR GOITER: ICD-10-CM

## 2022-07-01 DIAGNOSIS — Z23 ENCOUNTER FOR IMMUNIZATION: ICD-10-CM

## 2022-07-01 DIAGNOSIS — R06.02 SOB (SHORTNESS OF BREATH): ICD-10-CM

## 2022-07-01 DIAGNOSIS — E66.01 MORBID OBESITY WITH BMI OF 40.0-44.9, ADULT (HCC): ICD-10-CM

## 2022-07-01 DIAGNOSIS — F17.210 CIGARETTE SMOKER: ICD-10-CM

## 2022-07-01 PROBLEM — U07.1 COVID-19 VIRUS INFECTION: Status: ACTIVE | Noted: 2021-06-28

## 2022-07-01 PROCEDURE — 99214 OFFICE O/P EST MOD 30 MIN: CPT | Performed by: INTERNAL MEDICINE

## 2022-07-01 PROCEDURE — 90715 TDAP VACCINE 7 YRS/> IM: CPT | Performed by: INTERNAL MEDICINE

## 2022-07-01 RX ORDER — FUROSEMIDE 40 MG/1
40 TABLET ORAL DAILY
Qty: 30 TABLET | Refills: 5 | Status: SHIPPED | OUTPATIENT
Start: 2022-07-01

## 2022-07-01 RX ORDER — LOSARTAN POTASSIUM 50 MG/1
50 TABLET ORAL DAILY
Qty: 30 TABLET | Refills: 5 | Status: SHIPPED | OUTPATIENT
Start: 2022-07-01

## 2022-07-01 NOTE — PROGRESS NOTES
Room:     Identified pt with two pt identifiers(name and ). Reviewed record in preparation for visit and have obtained necessary documentation. All patient medications has been reviewed.     Chief Complaint   Patient presents with    Follow-up     3 month f/u       Health Maintenance Due   Topic    COVID-19 Vaccine (1)    Pneumococcal 0-64 years (1 - PCV)    DTaP/Tdap/Td series (1 - Tdap)       Vitals:    22 1311 22 1318   BP: (!) 131/92 (!) 138/102   Pulse: 80    Resp: 18    Temp: 98.4 °F (36.9 °C)    SpO2: 98%    Weight: 264 lb 3.2 oz (119.8 kg)    Height: 5' 5\" (1.651 m)    PainSc:   8    PainLoc: Leg    LMP: 2022

## 2022-07-01 NOTE — PROGRESS NOTES
SPORTS [facility]ICINE [facility]ARE  Fabiola Whitley MD, [facility] [address] [address]  Phone:  [phone]  Fax: [phone]       Chief Complaint   Patient presents with    Follow-up     3 month f/u   . SUBJECTIVE:    Kareen Horner is a 31 y.o. female Patient returns today with a known history of primary hypertension, morbid obesity, cigarette abuse, impaired glucose tolerance, multinodular goiter and has been seen by gynecologist for menorrhagia. Blood pressure keeps going up and she has body aches. Complains of dizziness when she bends over or when she hyperflexes her neck. Continues to have pain in her legs. When she took a fluid pill from the hospital, [facility], her legs felt better. She was seen in the ER at [facility] on 05/26/22 and had a chest xray taken that revealed atypical pattern of pulmonary edema. She was seen and given a fluid pill, she states, and subsequently released. She presented there with chest pain and she was given Furosemide to take for seven days, which helped get rid of the fluid. Patient comes in today for follow up. Complains of headaches in the frontal area and she is feeling not energized. She has no energy. Patient is seen for evaluation. Current Outpatient Medications   Medication Sig Dispense Refill    furosemide (LASIX) 40 mg tablet Take 1 Tablet by mouth daily. 30 Tablet 5    losartan (COZAAR) 50 mg tablet Take 1 Tablet by mouth daily. 30 Tablet 5    ethynodiol-ethinyl estradiol (Zovia 1-35, 28,) 1-35 mg-mcg tab Take 1 Tablet by mouth daily for 28 days. 28 Tablet 3    levothyroxine (SYNTHROID) 150 mcg tablet Take 1 Tablet by mouth daily. 90 Tablet 2    potassium chloride (K-DUR, KLOR-CON M20) 20 mEq tablet Take 1 Tablet by mouth two (2) times a day. 60 Tablet 11    albuterol sulfate (PROAIR RESPICLICK) 90 mcg/actuation breath activated inhaler Take 4-6 Puffs by inhalation every four (4) hours as needed for Wheezing.  1 Each 11    etonogestreL 68 mg impl by SubCUTAneous route. Past Medical History:   Diagnosis Date    Anemia 01/2012    8.9  7/13 7.6    Anxiety with depression     Atypical squamous cells of undetermined significance (ASCUS) on Papanicolaou smear of cervix 09/2013    Cervical high risk HPV (human papillomavirus) test positive 04/2021    TYPE 18    Chlamydia 2004    2015,  2018,  1/2022    Chronic bronchitis ([address]     Cigarette smoker     COVID-19 long hauler manifesting chronic loss of smell and taste 07/01/2022    COVID-19 virus infection 06/28/2021    taste and smell loss    CERVANTES (dyspnea on exertion) 06/29/2011    Edema of lower extremity 06/29/2011    H/O total thyroidectomy 07/21/2021    Solomon Oliver MD, - Papillary microcarcinoma    Herpes genitalis 2008    Hypertension     IGT (impaired glucose tolerance) 05/07/2019    Insertion of implantable subdermal contraceptive 04/2022    Nexplanon    Morbid obesity with BMI of 40.0-44.9, adult ([facility] [address]     Respiratory abnormalities     bronchitis    Thyroid nodule 07/24/2019    Consistent with a benign follicular nodule (includes adenomatoid nodule    Trichomonas vaginitis 2012    Twin pregnancy 01/2012    di di    Vitamin D deficiency 01/2012    13.2     Past Surgical History:   Procedure Laterality Date    HX THYROIDECTOMY  07/2021    Large goiter couldn't swallow.        No Known Allergies      REVIEW OF SYSTEMS:  General: negative for - chills or fever  ENT: negative for - headaches, nasal congestion or tinnitus  Respiratory: negative for - cough, hemoptysis, shortness of breath or wheezing  Cardiovascular : negative for - chest pain, edema, palpitations or shortness of breath  Gastrointestinal: negative for - abdominal pain, blood in stools, heartburn or nausea/vomiting  Genito-Urinary: no dysuria, trouble voiding, or hematuria  Musculoskeletal: negative for - gait disturbance, joint pain, joint stiffness or joint swelling  Neurological: no TIA or stroke symptoms  Hematologic: no bruises, no bleeding, no swollen glands  Integument: no lumps, mole changes, nail changes or rash  Endocrine: no malaise/lethargy or unexpected weight changes      Social History     Socioeconomic History    Marital status: SINGLE    Number of children: 7   Tobacco Use    Smoking status: Current Every Day Smoker     Packs/day: 0.25     Years: 19.00     Pack years: 4.75    Smokeless tobacco: Never Used   Vaping Use    Vaping Use: Never used   Substance and Sexual Activity    Alcohol use: Yes     Comment: occasionally    Drug use: No    Sexual activity: Yes     Partners: Male     Birth control/protection: Implant   Social History Narrative    Habits:  Smokes now down to a cigarette a day, she is trying to stop. She is a social drinker. She occasionally uses marijuana.         Social History:  The patient is single, her mom lives with her. She has seven children ages 3-16, two sons and five daughters. She completed high school and is gainfully employed for [employer]. She has no Rastafarian background.         Family History:  Father is unknown. Mother is 54 with RA. One brother, one sister, alive and well. Family History   Problem Relation Age of Onset    HIV/AIDS Mother    Stefan Medellin Arthritis-rheumatoid Mother     Diabetes Maternal Grandmother     Hypertension Maternal Grandmother     Anesth Problems Neg Hx        OBJECTIVE:    Visit Vitals  BP (!) 138/102   Pulse 80   Temp 98.4 °F (36.9 °C)   Resp 18   Ht 5' 5\" (1.651 m)   Wt 264 lb 3.2 oz (119.8 kg)   LMP 05/23/2022 (Approximate)   SpO2 98%   BMI 43.97 kg/m²     CONSTITUTIONAL: well , well nourished, appears age appropriate  EYES: perrla, eom intact  ENMT:moist mucous membranes, pharynx clear  NECK: supple.  Thyroid normal  RESPIRATORY: Chest: clear bilaterally   CARDIOVASCULAR: Heart: regular rate and rhythm  GASTROINTESTINAL: Abdomen: soft, bowel sounds active  HEMATOLOGIC: no pathological lymph nodes palpated  MUSCULOSKELETAL: Extremities: no edema, pulse 1+   INTEGUMENT: No unusual rashes or suspicious skin lesions noted. Nails appear normal.  NEUROLOGIC: non-focal exam   MENTAL STATUS: alert and oriented, appropriate affect           ASSESSMENT:  1. Primary hypertension    2. Morbid obesity with BMI of 40.0-44.9, adult ([facility] [address]    3. Cigarette smoker    4. IGT (impaired glucose tolerance)    5. Multinodular goiter    6. COVID-19 long hauler manifesting chronic loss of smell and taste    7. SOB (shortness of breath)      Blood pressure control is lacking. Chlorthalidone is not controlling the edema like she would like to see it and we stopped Chlorthalidone and placed her on Furosemide 40 mg daily. She will continue potassium daily. I am concerned about the appearance of the chest xray, that is atypical pulmonary edema, and we therefore ask for an echocardiogram and we will also do a BNP. If either are abnormal, we will refer to cardiology. Morbid obesity remains a concern and we encourage a heart healthy, weight reducing diet and physical activity 30 minutes five days a week. She continues to abuse cigarettes in spite of our recommendations to stop. She has impaired glucose tolerance and we are following hemoglobin A1c, which has been acceptable. Multinodular goiter is stable. She will be back to see me in two months, sooner if she has any problems. Papillary microcarcinoma was noted on her path report from the surgical procedure. Ultrasound will be repeated. No particular follow up was recommended by the surgeon, she states. I have discussed the diagnosis with the patient and the intended plan as seen in the  Orders. The patient understands and agees with the plan. The patient has   received an after visit summary and questions were answered concerning  future plans  Patient labs and/or xrays were reviewed  Past records were reviewed.     PLAN:  .  Orders Placed This Encounter  NT-PRO BNP    furosemide (LASIX) 40 mg tablet    losartan (COZAAR) 50 mg tablet       Follow-up and Dispositions    · Return in about 2 months (around 9/1/2022). ATTENTION:   This medical record was transcribed using an electronic medical records system. Although proofread, it may and can contain electronic and spelling errors. Other human spelling and other errors may be present. Corrections may be executed at a later time. Please feel free to contact us for any clarifications as needed.

## 2022-07-02 LAB — BNP SERPL-MCNC: 149 PG/ML

## 2022-08-03 RX ORDER — ERGOCALCIFEROL 1.25 MG/1
CAPSULE ORAL
Qty: 4 CAPSULE | Refills: 1 | Status: SHIPPED | OUTPATIENT
Start: 2022-08-03

## 2022-09-15 RX ORDER — ETHYNODIOL DIACETATE AND ETHINYL ESTRADIOL 1 MG-35MCG
KIT ORAL
Qty: 28 TABLET | Refills: 3 | Status: SHIPPED | OUTPATIENT
Start: 2022-09-15

## 2022-10-06 ENCOUNTER — TELEPHONE (OUTPATIENT)
Dept: OBGYN CLINIC | Age: 37
End: 2022-10-06

## 2022-10-06 NOTE — TELEPHONE ENCOUNTER
30year old patient last seen in the office on 6/27/2022      Patient calling to say that she started with light bleeding on sat and Sunday and starting on Monday she has been changing her pad every 30 minutes or less, passing tennis ball size clots and rates her cramping pain at 9.5 on the pain scale of 1-10 and has felt tired all weak and occasional feeling dizzy      Patient has the nexplanon inserted on 4/2022 and is taking ocp prescribed on 9/15/2022    Patient was advised to seek evaluation at the er and to have someone to drive her     Patient verbalized understanding    Jerry

## 2022-10-09 ENCOUNTER — HOSPITAL ENCOUNTER (EMERGENCY)
Age: 37
Discharge: HOME | End: 2022-10-09
Attending: EMERGENCY MEDICINE
Payer: COMMERCIAL

## 2022-10-09 ENCOUNTER — APPOINTMENT (OUTPATIENT)
Dept: ULTRASOUND IMAGING | Age: 37
End: 2022-10-09
Attending: EMERGENCY MEDICINE
Payer: COMMERCIAL

## 2022-10-09 VITALS
WEIGHT: 264 LBS | HEART RATE: 76 BPM | DIASTOLIC BLOOD PRESSURE: 82 MMHG | SYSTOLIC BLOOD PRESSURE: 128 MMHG | RESPIRATION RATE: 25 BRPM | BODY MASS INDEX: 43.93 KG/M2 | OXYGEN SATURATION: 100 % | TEMPERATURE: 98.3 F

## 2022-10-09 DIAGNOSIS — N93.8 DUB (DYSFUNCTIONAL UTERINE BLEEDING): Primary | ICD-10-CM

## 2022-10-09 LAB
ALBUMIN SERPL-MCNC: 3.2 G/DL (ref 3.5–5)
ALBUMIN/GLOB SERPL: 0.7 {RATIO} (ref 1.1–2.2)
ALP SERPL-CCNC: 58 U/L (ref 45–117)
ALT SERPL-CCNC: 28 U/L (ref 12–78)
ANION GAP SERPL CALC-SCNC: 8 MMOL/L (ref 5–15)
APPEARANCE UR: CLEAR
AST SERPL-CCNC: 20 U/L (ref 15–37)
BACTERIA URNS QL MICRO: NEGATIVE /HPF
BASOPHILS # BLD: 0 K/UL (ref 0–0.1)
BASOPHILS NFR BLD: 0 % (ref 0–1)
BILIRUB SERPL-MCNC: 0.1 MG/DL (ref 0.2–1)
BILIRUB UR QL: NEGATIVE
BUN SERPL-MCNC: 13 MG/DL (ref 6–20)
BUN/CREAT SERPL: 16 (ref 12–20)
CALCIUM SERPL-MCNC: 9.4 MG/DL (ref 8.5–10.1)
CHLORIDE SERPL-SCNC: 102 MMOL/L (ref 97–108)
CO2 SERPL-SCNC: 28 MMOL/L (ref 21–32)
COLOR UR: ABNORMAL
CREAT SERPL-MCNC: 0.81 MG/DL (ref 0.55–1.02)
DIFFERENTIAL METHOD BLD: ABNORMAL
EOSINOPHIL # BLD: 0.1 K/UL (ref 0–0.4)
EOSINOPHIL NFR BLD: 2 % (ref 0–7)
EPITH CASTS URNS QL MICRO: ABNORMAL /LPF
ERYTHROCYTE [DISTWIDTH] IN BLOOD BY AUTOMATED COUNT: 13.1 % (ref 11.5–14.5)
GLOBULIN SER CALC-MCNC: 4.6 G/DL (ref 2–4)
GLUCOSE SERPL-MCNC: 131 MG/DL (ref 65–100)
GLUCOSE UR STRIP.AUTO-MCNC: NEGATIVE MG/DL
HCT VFR BLD AUTO: 35 % (ref 35–47)
HGB BLD-MCNC: 11.4 G/DL (ref 11.5–16)
HGB UR QL STRIP: ABNORMAL
HYALINE CASTS URNS QL MICRO: ABNORMAL /LPF (ref 0–2)
IMM GRANULOCYTES # BLD AUTO: 0 K/UL (ref 0–0.04)
IMM GRANULOCYTES NFR BLD AUTO: 0 % (ref 0–0.5)
KETONES UR QL STRIP.AUTO: ABNORMAL MG/DL
LEUKOCYTE ESTERASE UR QL STRIP.AUTO: NEGATIVE
LYMPHOCYTES # BLD: 2.7 K/UL (ref 0.8–3.5)
LYMPHOCYTES NFR BLD: 38 % (ref 12–49)
MCH RBC QN AUTO: 28.9 PG (ref 26–34)
MCHC RBC AUTO-ENTMCNC: 32.6 G/DL (ref 30–36.5)
MCV RBC AUTO: 88.6 FL (ref 80–99)
MONOCYTES # BLD: 0.4 K/UL (ref 0–1)
MONOCYTES NFR BLD: 5 % (ref 5–13)
NEUTS SEG # BLD: 3.9 K/UL (ref 1.8–8)
NEUTS SEG NFR BLD: 55 % (ref 32–75)
NITRITE UR QL STRIP.AUTO: NEGATIVE
NRBC # BLD: 0 K/UL (ref 0–0.01)
NRBC BLD-RTO: 0 PER 100 WBC
PH UR STRIP: 7 [PH] (ref 5–8)
PLATELET # BLD AUTO: 289 K/UL (ref 150–400)
PMV BLD AUTO: 9.8 FL (ref 8.9–12.9)
POTASSIUM SERPL-SCNC: 2.8 MMOL/L (ref 3.5–5.1)
PROT SERPL-MCNC: 7.8 G/DL (ref 6.4–8.2)
PROT UR STRIP-MCNC: 30 MG/DL
RBC # BLD AUTO: 3.95 M/UL (ref 3.8–5.2)
RBC #/AREA URNS HPF: ABNORMAL /HPF (ref 0–5)
SODIUM SERPL-SCNC: 138 MMOL/L (ref 136–145)
SP GR UR REFRACTOMETRY: 1.03 (ref 1–1.03)
UA: UC IF INDICATED,UAUC: ABNORMAL
UROBILINOGEN UR QL STRIP.AUTO: 1 EU/DL (ref 0.2–1)
WBC # BLD AUTO: 7.1 K/UL (ref 3.6–11)
WBC URNS QL MICRO: ABNORMAL /HPF (ref 0–4)

## 2022-10-09 PROCEDURE — 80053 COMPREHEN METABOLIC PANEL: CPT

## 2022-10-09 PROCEDURE — 74011250636 HC RX REV CODE- 250/636: Performed by: EMERGENCY MEDICINE

## 2022-10-09 PROCEDURE — 96361 HYDRATE IV INFUSION ADD-ON: CPT

## 2022-10-09 PROCEDURE — 76830 TRANSVAGINAL US NON-OB: CPT

## 2022-10-09 PROCEDURE — 81001 URINALYSIS AUTO W/SCOPE: CPT

## 2022-10-09 PROCEDURE — 99284 EMERGENCY DEPT VISIT MOD MDM: CPT

## 2022-10-09 PROCEDURE — 76856 US EXAM PELVIC COMPLETE: CPT

## 2022-10-09 PROCEDURE — 36415 COLL VENOUS BLD VENIPUNCTURE: CPT

## 2022-10-09 PROCEDURE — 85025 COMPLETE CBC W/AUTO DIFF WBC: CPT

## 2022-10-09 PROCEDURE — 96374 THER/PROPH/DIAG INJ IV PUSH: CPT

## 2022-10-09 RX ORDER — NAPROXEN 500 MG/1
500 TABLET ORAL 2 TIMES DAILY WITH MEALS
Qty: 20 TABLET | Refills: 0 | Status: SHIPPED | OUTPATIENT
Start: 2022-10-09

## 2022-10-09 RX ORDER — KETOROLAC TROMETHAMINE 30 MG/ML
30 INJECTION, SOLUTION INTRAMUSCULAR; INTRAVENOUS
Status: COMPLETED | OUTPATIENT
Start: 2022-10-09 | End: 2022-10-09

## 2022-10-09 RX ADMIN — KETOROLAC TROMETHAMINE 30 MG: 30 INJECTION, SOLUTION INTRAMUSCULAR at 02:08

## 2022-10-09 RX ADMIN — SODIUM CHLORIDE 1000 ML: 9 INJECTION, SOLUTION INTRAVENOUS at 02:09

## 2022-10-09 NOTE — ED TRIAGE NOTES
Pt.  has had heavy vaginal bleeding with clots,  started bleeding 10/3 and has been continued since then. Pt.  was started on oral BC in July after having heavy bleeding then,  also has the implannon in her arm but she cannot feel it. Pt. Was trying to get in with OB office this week but could not. Pt.  abdominal pain is severe at this time.

## 2022-10-09 NOTE — ED PROVIDER NOTES
60-year-old female with a past medical history significant for morbid obesity, anxiety and depression, STD, cigarette smoking, hypertension, thyroidectomy, herpes genitalis, G6, P7, last menstrual period was 2 months ago who presents to the ER with a complaint of persistent vaginal bleeding for 2 months worse over the past week with increased bleeding and clots and pelvic cramps, severity 5 out of 10, constant, without any aggravation or relieving factor and nonradiating. The patient state that she attempted to contact her gynecologist to schedule an appointment without any success. She denies any fever chills, headache, cough or congestion, chest pain, shortness of breath, nausea, vomiting, diarrhea, constipation, dysuria, sick contact, skin rash or recent travel, prior abdominal surgery or history of same.        Past Medical History:   Diagnosis Date    Anemia 01/2012    8.9  7/13 7.6    Anxiety with depression     Atypical squamous cells of undetermined significance (ASCUS) on Papanicolaou smear of cervix 09/2013    Cervical high risk HPV (human papillomavirus) test positive 04/2021    TYPE 18    Chlamydia 2004    2015,  2018,  1/2022    Chronic bronchitis ([address]     Cigarette smoker     COVID-19 long hauler manifesting chronic loss of smell and taste 07/01/2022    COVID-19 virus infection 06/28/2021    taste and smell loss    CERVANTES (dyspnea on exertion) 06/29/2011    Edema of lower extremity 06/29/2011    H/O total thyroidectomy 07/21/2021    Blanca Ann MD, - Papillary microcarcinoma    Herpes genitalis 2008    Hypertension     IGT (impaired glucose tolerance) 05/07/2019    Insertion of implantable subdermal contraceptive 04/2022    Nexplanon    Morbid obesity with BMI of 40.0-44.9, adult ([address]     Respiratory abnormalities     bronchitis    Thyroid nodule 07/24/2019    Consistent with a benign follicular nodule (includes adenomatoid nodule    Trichomonas vaginitis 2012    Twin pregnancy 01/2012 [facility]    Vitamin D deficiency 01/2012    13.2       Past Surgical History:   Procedure Laterality Date    HX THYROIDECTOMY  07/2021    Large goiter couldn't swallow. Family History:   Problem Relation Age of Onset    HIV/AIDS Mother     Arthritis-rheumatoid Mother     Diabetes Maternal Grandmother     Hypertension Maternal Grandmother     Anesth Problems Neg Hx        Social History     Socioeconomic History    Marital status: SINGLE     Spouse name: Not on file    Number of children: 7    Years of education: Not on file    Highest education level: Not on file   Occupational History    Not on file   Tobacco Use    Smoking status: Every Day     Packs/day: 0.25     Years: 19.00     Pack years: 4.75     Types: Cigarettes    Smokeless tobacco: Never   Vaping Use    Vaping Use: Never used   Substance and Sexual Activity    Alcohol use: Yes     Comment: occasionally    Drug use: No    Sexual activity: Yes     Partners: Male     Birth control/protection: Implant   Other Topics Concern    Not on file   Social History Narrative    Habits:  Smokes now down to a cigarette a day, she is trying to stop. She is a social drinker. She occasionally uses marijuana. Social History:  The patient is single, her mom lives with her. She has seven children ages 3-16, two sons and five daughters. She completed high school and is gainfully employed for [employer]. She has no Church background. Family History:  Father is unknown. Mother is 54 with RA. One brother, one sister, alive and well. Social Determinants of Health     Financial Resource Strain: Not on file   Food Insecurity: Not on file   Transportation Needs: Not on file   Physical Activity: Not on file   Stress: Not on file   Social Connections: Not on file   Intimate Partner Violence: Not on file   Housing Stability: Not on file         ALLERGIES: Patient has no known allergies.     Review of Systems   All other systems reviewed and are negative. Vitals:    10/09/22 0148 10/09/22 0159   BP: (!) 156/94    Pulse: 82    Resp: 18    Temp: 98.3 °F (36.8 °C)    SpO2: 98% 100%   Weight: 119.7 kg (264 lb)             Physical Exam  Vitals and nursing note reviewed. Exam conducted with a chaperone present. CONSTITUTIONAL: Well-appearing; well-nourished; in no apparent distress  HEAD: Normocephalic; atraumatic  EYES: PERRL; EOM intact; conjunctiva and sclera are clear bilaterally. ENT: No rhinorrhea; normal pharynx with no tonsillar hypertrophy; mucous membranes pink/moist, no erythema, no exudate. NECK: Supple; non-tender; no cervical lymphadenopathy  CARD: Normal S1, S2; no murmurs, rubs, or gallops. Regular rate and rhythm. RESP: Normal respiratory effort; breath sounds clear and equal bilaterally; no wheezes, rhonchi, or rales. ABD: Normal bowel sounds; non-distended; non-tender; no palpable organomegaly, no masses, no bruits. Back Exam: Normal inspection; no vertebral point tenderness, no CVA tenderness. Normal range of motion. EXT: Normal ROM in all four extremities; non-tender to palpation; no swelling or deformity; distal pulses are normal, no edema. SKIN: Warm; dry; no rash. NEURO:Alert and oriented x 3, coherent, RIKA-XII grossly intact, sensory and motor are non-focal.        MDM  Number of Diagnoses or Management Options  DUB (dysfunctional uterine bleeding)  Diagnosis management comments: Assessment: 47-year-old female who presents to the ER for evaluation for dysfunctional uterine bleeding with pelvic cramping and persistent bleeding. She has a fairly benign exam and stable vital signs. She will need evaluation for anemia, uterine fibroid/hemorrhage    Plan: Lab/IV fluid/Toradol/ultrasound pelvis/education, reassurance, symptomatic treatment/serial exam/ Monitor and Reevaluate.          Amount and/or Complexity of Data Reviewed  Clinical lab tests: ordered and reviewed  Tests in the radiology section of CPT®: ordered and reviewed  Tests in the medicine section of CPT®: reviewed and ordered  Discussion of test results with the performing providers: yes  Decide to obtain previous medical records or to obtain history from someone other than the patient: yes  Obtain history from someone other than the patient: yes  Review and summarize past medical records: yes  Discuss the patient with other providers: yes  Independent visualization of images, tracings, or specimens: yes    Risk of Complications, Morbidity, and/or Mortality  Presenting problems: moderate  Diagnostic procedures: moderate  Management options: moderate    Patient Progress  Patient progress: stable         Procedures      Progress Note:   Pt has been reexamined by Marixa Diallo MD. Pt is feeling much better. Symptoms have improved. All available results have been reviewed with pt and any available family. Pt understands sx, dx, and tx in ED. Care plan has been outlined and questions have been answered. Pt is ready to go home. Will send home on dysfunctional uterine bleeding instruction. . Outpatient referral with PCP/gynecologist for reevaluation and further treatment as needed. Written by Marixa Diallo MD,3:00 AM    .   .

## 2022-10-09 NOTE — ED NOTES
Physician went over dc instructions with pt who had no further questions. Pt ambulating with steady gait and talking in complete sentences. Pt ano x 4 gcs 15.  Pt going home with friend

## 2022-10-24 ENCOUNTER — OFFICE VISIT (OUTPATIENT)
Dept: OBGYN CLINIC | Age: 37
End: 2022-10-24
Payer: COMMERCIAL

## 2022-10-24 VITALS
BODY MASS INDEX: 45.83 KG/M2 | SYSTOLIC BLOOD PRESSURE: 135 MMHG | DIASTOLIC BLOOD PRESSURE: 91 MMHG | HEART RATE: 85 BPM | WEIGHT: 275.4 LBS

## 2022-10-24 DIAGNOSIS — D21.9 FIBROIDS: ICD-10-CM

## 2022-10-24 DIAGNOSIS — N92.4 MENORRHAGIA, PREMENOPAUSAL: Primary | ICD-10-CM

## 2022-10-24 PROCEDURE — 99214 OFFICE O/P EST MOD 30 MIN: CPT | Performed by: OBSTETRICS & GYNECOLOGY

## 2022-10-24 NOTE — PROGRESS NOTES
ABNORMAL BLEEDING NOTE    David Agosto is a 30 y.o. female who complains of vaginal bleeding problems. Recently went to ER had US showing 1.6 cm fibroid. Independently reviewed pictures. Her current method of family planning is Nexplanon. And she is also taking OCP. Still having trouble feeling drained. She developed this problem approximately 1 month ago. She has had vaginal bleeding which she describes as heavy lasting up to 30 days. Pad or tampon count: changes every 6 hours. Associated symptoms include fatigue. Alleviating factors: none    Aggravating factors: none      The patient is sexually active.     Last Pap smear:was normal.    Her relevant past medical history:   Past Medical History:   Diagnosis Date    Anemia 01/2012    8.9  7/13 7.6    Anxiety with depression     Atypical squamous cells of undetermined significance (ASCUS) on Papanicolaou smear of cervix 09/2013    Cervical high risk HPV (human papillomavirus) test positive 04/2021    TYPE 18    Chlamydia 2004    2015,  2018,  1/2022    Chronic bronchitis ([address]     Cigarette smoker     COVID-19 long hauler manifesting chronic loss of smell and taste 07/01/2022    COVID-19 virus infection 06/28/2021    taste and smell loss    CERVANTES (dyspnea on exertion) 06/29/2011    Edema of lower extremity 06/29/2011    Fibroid 10/09/2022    1.6 cm    H/O total thyroidectomy 07/21/2021    Jeanne English MD, - Papillary microcarcinoma    Herpes genitalis 2008    Hypertension     IGT (impaired glucose tolerance) 05/07/2019    Insertion of implantable subdermal contraceptive 04/2022    Nexplanon    IUD mechanical complication 0167    Had to be removed surgically    Morbid obesity with BMI of 40.0-44.9, adult ([address]     Respiratory abnormalities     bronchitis    Thyroid nodule 07/24/2019    Consistent with a benign follicular nodule (includes adenomatoid nodule    Trichomonas vaginitis 2012    Twin pregnancy 01/2012    [facility] Vitamin D deficiency 01/2012    13.2        Patient Active Problem List   Diagnosis Code    Menorrhagia, premenopausal N92.4    Morbid obesity with BMI of 40.0-44.9, adult ([facility] E66.01, Z68.41    Hypertension I10    Edema of lower extremity R60.0    Cigarette smoker F17.210    CERVANTES (dyspnea on exertion) R06.09    IGT (impaired glucose tolerance) R73.02    Thyroid nodule E04.1    Hip pain, chronic, right M25.551, G89.29    Multinodular goiter E04.2    Chlamydia A74.9    H/O total thyroidectomy E89.0    Anxiety with depression F41.8    Abnormal uterine bleeding (AUB) N93.9    COVID-19 virus infection U07.1    COVID-19 long hauler manifesting chronic loss of smell and taste R43.8, U09.9    Fibroid D21.9     Past Medical History:   Diagnosis Date    Anemia 01/2012    8.9  7/13 7.6    Anxiety with depression     Atypical squamous cells of undetermined significance (ASCUS) on Papanicolaou smear of cervix 09/2013    Cervical high risk HPV (human papillomavirus) test positive 04/2021    TYPE 18    Chlamydia 2004    2015,  2018,  1/2022    Chronic bronchitis ([facility]     Cigarette smoker     COVID-19 long hauler manifesting chronic loss of smell and taste 07/01/2022    COVID-19 virus infection 06/28/2021    taste and smell loss    CERVANTES (dyspnea on exertion) 06/29/2011    Edema of lower extremity 06/29/2011    Fibroid 10/09/2022    1.6 cm    H/O total thyroidectomy 07/21/2021    Kj Campos MD, - Papillary microcarcinoma    Herpes genitalis 2008    Hypertension     IGT (impaired glucose tolerance) 05/07/2019    Insertion of implantable subdermal contraceptive 04/2022    Nexplanon    IUD mechanical complication 2569    Had to be removed surgically    Morbid obesity with BMI of 40.0-44.9, adult ([facility]     Respiratory abnormalities     bronchitis    Thyroid nodule 07/24/2019    Consistent with a benign follicular nodule (includes adenomatoid nodule    Trichomonas vaginitis 2012    Twin pregnancy 01/2012    di di    Vitamin D deficiency 2012    13.2     Past Surgical History:   Procedure Laterality Date    HX THYROIDECTOMY  2021    Large goiter couldn't swallow. OB History    Para Term  AB Living   7 6 5 1 1 7   SAB IAB Ectopic Molar Multiple Live Births   1 0 0 0 1 7      # Outcome Date GA Lbr Kirill/2nd Weight Sex Delivery Anes PTL Lv   7 Term 12/01/15 38w0d  7 lb 3 oz (3.26 kg) F Vag-Spont EPI  JEY   6 Term 13 39w0d  8 lb 1 oz (3.657 kg) F Vag-Spont EPI  JEY   5A  12 35w0d  5 lb 5 oz (2.41 kg) M Vag-Spont   JEY      Birth Comments: Twins      Complications: Preeclampsia   5B  12 35w0d  4 lb 15 oz (2.24 kg) M Vag-Spont   JEY      Birth Comments: Twins      Complications: Preeclampsia   4 Term 09 39w0d  7 lb 11 oz (3.487 kg) F Vag-Spont EPI  JEY   3 Term 07 40w0d  7 lb 11 oz (3.487 kg) F Vag-Spont EPI  JEY   2 Term 08/10/05 40w0d  6 lb (2.722 kg) F Vag-Spont EPI  JEY   1 SAB              Gyn Flowsheet:  No flowsheet data found. Social History     Socioeconomic History    Marital status: SINGLE    Number of children: 7   Tobacco Use    Smoking status: Every Day     Packs/day: 0.25     Years: 19.00     Pack years: 4.75     Types: Cigarettes    Smokeless tobacco: Never   Vaping Use    Vaping Use: Never used   Substance and Sexual Activity    Alcohol use: Yes     Comment: occasionally    Drug use: No    Sexual activity: Yes     Partners: Male     Birth control/protection: Implant   Social History Narrative    Habits:  Smokes now down to a cigarette a day, she is trying to stop. She is a social drinker. She occasionally uses marijuana. Social History:  The patient is single, her mom lives with her. She has seven children ages 3-16, two sons and five daughters. She completed high school and is gainfully employed for [employer]. She has no [employer] background. Family History:  Father is unknown. Mother is 54 with RA.   One brother, one sister, alive and well. Family History   Problem Relation Age of Onset    HIV/AIDS Mother     Arthritis-rheumatoid Mother     Diabetes Maternal Grandmother     Hypertension Maternal Grandmother     Anesth Problems Neg Hx      Current Outpatient Medications on File Prior to Visit   Medication Sig Dispense Refill    Kelnor 1/35, 28, 1-35 mg-mcg tab TAKE 1 TABLET BY MOUTH EVERY DAY 28 Tablet 3    furosemide (LASIX) 40 mg tablet Take 1 Tablet by mouth daily. 30 Tablet 5    levothyroxine (SYNTHROID) 150 mcg tablet Take 1 Tablet by mouth daily. 90 Tablet 2    potassium chloride (K-DUR, KLOR-CON M20) 20 mEq tablet Take 1 Tablet by mouth two (2) times a day. 60 Tablet 11    albuterol sulfate (PROAIR RESPICLICK) 90 mcg/actuation breath activated inhaler Take 4-6 Puffs by inhalation every four (4) hours as needed for Wheezing. 1 Each 11    etonogestreL 68 mg impl by SubCUTAneous route. naproxen (Naprosyn) 500 mg tablet Take 1 Tablet by mouth two (2) times daily (with meals). (Patient not taking: Reported on 10/24/2022) 20 Tablet 0    ergocalciferol (ERGOCALCIFEROL) 1,250 mcg (50,000 unit) capsule TAKE 1 CAPSULE BY MOUTH EVERY SEVEN (7) DAYS. (Patient not taking: Reported on 10/24/2022) 4 Capsule 1    losartan (COZAAR) 50 mg tablet Take 1 Tablet by mouth daily. (Patient not taking: Reported on 10/24/2022) 30 Tablet 5     No current facility-administered medications on file prior to visit.      No Known Allergies    Review of Systems - History obtained from the patient-negative for:  Constitutional: weight loss, fever, night sweats  HEENT: hearing loss, earache, congestion, snoring, sorethroat  CV: chest pain, palpitations, edema  Resp: cough, shortness of breath, wheezing  Breast: breast lumps, nipple discharge, galactorrhea  GI: change in bowel habits, abdominal pain, black or bloody stools  : frequency, dysuria, hematuria, vaginal discharge  MSK: back pain, joint pain, muscle pain  Skin: itching, rash, hives  Neuro: dizziness, headache, confusion, weakness  Psych: anxiety, depression, change in mood  Heme/lymph: bleeding, bruising, pallor        Objective:    Visit Vitals  BP (!) 135/91   Pulse 85   Wt 275 lb 6.4 oz (124.9 kg)   BMI 45.83 kg/m²          PHYSICAL EXAMINATION    Constitutional  Appearance: well-nourished, well developed, alert, in no acute distress    HENT  Head and Face: appears normal    Gastrointestinal  Abdominal Examination: abdomen non-tender to palpation, normal bowel sounds, no masses present  Liver and spleen: no hepatomegaly present, spleen not palpable  Hernias: no hernias identified    Skin  General Inspection: no rash, no lesions identified    Neurologic/Psychiatric  Mental Status:  Orientation: grossly oriented to person, place and time  Mood and Affect: mood normal, affect appropriate    Assessment:     ICD-10-CM ICD-9-CM    1. Menorrhagia, premenopausal  N92.4 627.0       2. Fibroids  D21.9 215.9           Plan:   Discussed options at length. 30 min total.   Davinci hyst vs Novasure. Pt desires Novasure.     Risks discussed with patient re damage to bowel bladder, nerves blood vessels, hemorrhage/transfusion, anesthesia risks etc.

## 2022-12-15 ENCOUNTER — ANESTHESIA EVENT (OUTPATIENT)
Dept: SURGERY | Age: 37
End: 2022-12-15
Payer: COMMERCIAL

## 2022-12-15 RX ORDER — CHLORTHALIDONE 25 MG/1
25 TABLET ORAL DAILY
COMMUNITY

## 2022-12-15 NOTE — H&P
Gynecology History and Physical    Name: iGnna Valadez MRN: [id number] SSN: [id number]    YOB: 1985  Age: 30 y.o. Sex: female       Subjective:      Chief complaint: excessive vaginal bleeding with menses    Dede Lesches is a 30 y.o.  female with a history of excessive bleeding. Previous evaluation has been done with pap smear and pelvic exam, which revealed no apparent cause for her excessive bleeding. Previous treatment has consisted of NSAIDS/OCP. She is now admitted for outpatient surgery consisting of Procedure(s) (LRB):  HYSTEROSCOPY DILATION AND CURETTAGE NOVASURE ABLATION (N/A)  . (N/A). The current method of family planning is nexplanon.     Past Medical History:  Patient Active Problem List   Diagnosis Code    Menorrhagia, premenopausal N92.4    Morbid obesity with BMI of 40.0-44.9, adult ([facility] [address] E66.01, Z68.41    Hypertension I10    Edema of lower extremity R60.0    Cigarette smoker F17.210    CERVANTES (dyspnea on exertion) R06.09    IGT (impaired glucose tolerance) R73.02    Thyroid nodule E04.1    Hip pain, chronic, right M25.551, G89.29    Multinodular goiter E04.2    Chlamydia A74.9    H/O total thyroidectomy E89.0    Anxiety with depression F41.8    Abnormal uterine bleeding (AUB) N93.9    COVID-19 virus infection U07.1    COVID-19 long hauler manifesting chronic loss of smell and taste R43.8, U09.9    Fibroid D21.9     Past Medical History:   Diagnosis Date    Anemia 01/2012    8.9  7/13 7.6    Anxiety with depression     Atypical squamous cells of undetermined significance (ASCUS) on Papanicolaou smear of cervix 09/2013    Cervical high risk HPV (human papillomavirus) test positive 04/2021    TYPE 18    Chlamydia 2004    2015,  2018,  1/2022    Chronic bronchitis ([facility] [address]     Cigarette smoker     COVID-19 long hauler manifesting chronic loss of smell and taste 07/01/2022    COVID-19 virus infection 06/28/2021    taste and smell loss    CERVANTES (dyspnea on exertion) 06/29/2011 Edema of lower extremity 2011    Fibroid 10/09/2022    1.6 cm    H/O total thyroidectomy 2021    Jesse Cantor MD, - Papillary microcarcinoma    Herpes genitalis     Hypertension     IGT (impaired glucose tolerance) 2019    Insertion of implantable subdermal contraceptive 2022    Nexplanon    IUD mechanical complication 0340    Had to be removed surgically    Morbid obesity with BMI of 40.0-44.9, adult ([address]     Respiratory abnormalities     bronchitis    Thyroid nodule 2019    Consistent with a benign follicular nodule (includes adenomatoid nodule    Trichomonas vaginitis     Twin pregnancy 2012    di di    Vitamin D deficiency 2012    13.2     Past Surgical History:   Procedure Laterality Date    HX THYROIDECTOMY  2021    Large goiter couldn't swallow. OB History    Para Term  AB Living   7 6 5 1 1 7   SAB IAB Ectopic Molar Multiple Live Births   1 0 0 0 1 7      # Outcome Date GA Lbr Kirill/2nd Weight Sex Delivery Anes PTL Lv   7 Term 12/01/15 38w0d  7 lb 3 oz (3.26 kg) F Vag-Spont EPI  JEY   6 Term 13 39w0d  8 lb 1 oz (3.657 kg) F Vag-Spont EPI  JEY   5A  12 35w0d  5 lb 5 oz (2.41 kg) M Vag-Spont   JEY      Birth Comments: Twins      Complications: Preeclampsia   5B  12 35w0d  4 lb 15 oz (2.24 kg) M Vag-Spont   JEY      Birth Comments: Twins      Complications: Preeclampsia   4 Term 09 39w0d  7 lb 11 oz (3.487 kg) F Vag-Spont EPI  JEY   3 Term 07 40w0d  7 lb 11 oz (3.487 kg) F Vag-Spont EPI  JEY   2 Term 08/10/05 40w0d  6 lb (2.722 kg) F Vag-Spont EPI  JEY   1 SAB              Gyn Flowsheet:  No flowsheet data found.   Social History     Socioeconomic History    Marital status: SINGLE    Number of children: 7   Tobacco Use    Smoking status: Every Day     Packs/day: 0.25     Years: 19.00     Pack years: 4.75     Types: Cigarettes    Smokeless tobacco: Never   Vaping Use    Vaping Use: Never used Substance and Sexual Activity    Alcohol use: Yes     Comment: occasionally    Drug use: No    Sexual activity: Yes     Partners: Male     Birth control/protection: Implant   Social History Narrative    Habits:  Smokes now down to a cigarette a day, she is trying to stop. She is a social drinker. She occasionally uses marijuana. Social History:  The patient is single, her mom lives with her. She has seven children ages 3-16, two sons and five daughters. She completed high school and is gainfully employed for [employer]. She has no [employer] background. Family History:  Father is unknown. Mother is 54 with RA. One brother, one sister, alive and well. Family History   Problem Relation Age of Onset    HIV/AIDS Mother     Arthritis-rheumatoid Mother     Alzheimer's Disease Mother 62    Diabetes Maternal Grandmother     Hypertension Maternal Grandmother     Anesth Problems Neg Hx      No current facility-administered medications on file prior to encounter. Current Outpatient Medications on File Prior to Encounter   Medication Sig Dispense Refill    chlorthalidone (HYGROTON) 25 mg tablet Take 25 mg by mouth daily. Nellie Tan 1/35, 28, 1-35 mg-mcg tab TAKE 1 TABLET BY MOUTH EVERY DAY 28 Tablet 3    ergocalciferol (ERGOCALCIFEROL) 1,250 mcg (50,000 unit) capsule TAKE 1 CAPSULE BY MOUTH EVERY SEVEN (7) DAYS. 4 Capsule 1    furosemide (LASIX) 40 mg tablet Take 1 Tablet by mouth daily. 30 Tablet 5    levothyroxine (SYNTHROID) 150 mcg tablet Take 1 Tablet by mouth daily. 90 Tablet 2    potassium chloride (K-DUR, KLOR-CON M20) 20 mEq tablet Take 1 Tablet by mouth two (2) times a day. 60 Tablet 11    albuterol sulfate (PROAIR RESPICLICK) 90 mcg/actuation breath activated inhaler Take 4-6 Puffs by inhalation every four (4) hours as needed for Wheezing. 1 Each 11    etonogestreL 68 mg impl by SubCUTAneous route.  (Patient not taking: Reported on 12/16/2022)       No Known Allergies    Review of Systems:  A comprehensive review of systems was negative except for that written in the History of Present Illness. Objective:     Vitals:    12/16/22 0639   BP: (!) 134/93   Pulse: 71   Resp: 15   Temp: 98.7 °F (37.1 °C)   SpO2: 100%   Weight: 270 lb (122.5 kg)   Height: 5' 5\" (1.651 m)       Physical Exam:  Patient without distress. Heart: Regular rate and rhythm  Lung: normal respiratory effort  Abdomen: soft, nontender    Assessment:   Excessive vaginal bleeding. Plan:     Procedure(s) (LRB):  HYSTEROSCOPY DILATION AND CURETTAGE NOVASURE ABLATION (N/A)  . (N/A)  Discussed the risks of surgery including the risks of bleeding, infection, deep vein thrombosis, and surgical injuries to internal organs including but not limited to the bowels, bladder, rectum, and female reproductive organs. The patient understands the risks; any and all questions were answered to the patient's satisfaction. Expected outcomes reviewed. Advised 90% chance of satisfaction from procedure and 40-50% chance of amenorrhea. 10% chance of failure.     Signed By:  Faraz Arora MD     December 16, 2022

## 2022-12-15 NOTE — PERIOP NOTES
[address] [address] [facility]   MAIN OR                                  [phone]   MAIN PRE OP                          [phone]                                                                                AMBULATORY PRE OP          [phone]  PRE-ADMISSION TESTING    [phone]   Surgery Date:  12/16/22       Is surgery arrival time given by surgeon? YES  NO  If NO, [facility] staff will call you between 3 and 7pm the day before your surgery with your arrival time. (If your surgery is on a Monday, we will call you the Friday before.)    Call [phone] after 7pm Monday-Friday if you did not receive this call. INSTRUCTIONS BEFORE YOUR SURGERY   When You  Arrive Arrive at the [address] [facility] on the day of your surgery  Have your insurance card, photo ID, and any copayment (if needed)   Food   and   Drink NO food or drink after midnight the night before surgery    This means NO water, gum, mints, coffee, juice, etc.  No alcohol (beer, wine, liquor) 24 hours before and after surgery   Medications to   TAKE   Morning of Surgery MEDICATIONS TO TAKE THE MORNING OF SURGERY WITH A SIP OF WATER:   levothyroxine   Medications  To  STOP      7 days before surgery Non-Steroidal anti-inflammatory Drugs (NSAID's): for example, Ibuprofen (Advil, Motrin), Naproxen (Aleve)  Aspirin, if taking for pain   Herbal supplements, vitamins, and fish oil  Other:  (Pain medications not listed above, including Tylenol may be taken)   Blood  Thinners If you take  Aspirin, Plavix, Coumadin, or any blood-thinning or anti-blood clot medicine, talk to the doctor who prescribed the medications for pre-operative instructions.    Bathing Clothing  Jewelry  Valuables     If you shower the morning of surgery, please do not apply anything to your skin (lotions, powders, deodorant, or makeup, especially mascara)  Follow Chlorhex[employer] body wash instructions provided to you during PAT appointment. Begin 3 days prior to surgery. Do not shave or trim anywhere 24 hours before surgery  Wear your hair loose or down; no pony-tails, buns, or metal hair clips  Wear loose, comfortable, clean clothes  Wear glasses instead of contacts  Leave money, valuables, and jewelry, including body piercings, at home  If you were given an [email or web address], bring it on day of surgery. Going Home - or Spending the Night SAME-DAY SURGERY: You must have a responsible adult drive you home and stay with you 24 hours after surgery  ADMITS: If your doctor is keeping you in the hospital after surgery, leave personal belongings/luggage in your car until you have a hospital room number. Hospital discharge time is 12 noon  Drivers must be here before 12 noon unless you are told differently   Special Instructions Please bring covid vaccine card day of surgery       Follow all instructions so your surgery wont be cancelled. Please, be on time. If a situation occurs and you are delayed the day of surgery, call [phone]     If your physical condition changes (like a fever, cold, flu, etc.) call your surgeon. Home medication(s) reviewed and verified via   PHONE     during PAT appointment. The patient was contacted by  PHONE      The patient verbalizes understanding of all instructions and      DOES NOT   need reinforcement.

## 2022-12-16 ENCOUNTER — HOSPITAL ENCOUNTER (OUTPATIENT)
Age: 37
Setting detail: OUTPATIENT SURGERY
Discharge: HOME | End: 2022-12-16
Attending: OBSTETRICS & GYNECOLOGY | Admitting: OBSTETRICS & GYNECOLOGY
Payer: COMMERCIAL

## 2022-12-16 ENCOUNTER — ANESTHESIA (OUTPATIENT)
Dept: SURGERY | Age: 37
End: 2022-12-16
Payer: COMMERCIAL

## 2022-12-16 VITALS
OXYGEN SATURATION: 97 % | HEART RATE: 68 BPM | BODY MASS INDEX: 44.98 KG/M2 | DIASTOLIC BLOOD PRESSURE: 75 MMHG | RESPIRATION RATE: 15 BRPM | SYSTOLIC BLOOD PRESSURE: 154 MMHG | HEIGHT: 65 IN | WEIGHT: 270 LBS | TEMPERATURE: 98.1 F

## 2022-12-16 DIAGNOSIS — N92.0 MENORRHAGIA WITH REGULAR CYCLE: ICD-10-CM

## 2022-12-16 DIAGNOSIS — G89.18 POSTOPERATIVE PAIN: Primary | ICD-10-CM

## 2022-12-16 LAB
COMMENT, HOLDF: NORMAL
ERYTHROCYTE [DISTWIDTH] IN BLOOD BY AUTOMATED COUNT: 13.1 % (ref 11.5–14.5)
HCG UR QL: NEGATIVE
HCT VFR BLD AUTO: 33.1 % (ref 35–47)
HGB BLD-MCNC: 10.7 G/DL (ref 11.5–16)
MCH RBC QN AUTO: 28.5 PG (ref 26–34)
MCHC RBC AUTO-ENTMCNC: 32.3 G/DL (ref 30–36.5)
MCV RBC AUTO: 88 FL (ref 80–99)
NRBC # BLD: 0 K/UL (ref 0–0.01)
NRBC BLD-RTO: 0 PER 100 WBC
PLATELET # BLD AUTO: 286 K/UL (ref 150–400)
PMV BLD AUTO: 10.1 FL (ref 8.9–12.9)
RBC # BLD AUTO: 3.76 M/UL (ref 3.8–5.2)
SAMPLES BEING HELD,HOLD: NORMAL
WBC # BLD AUTO: 6 K/UL (ref 3.6–11)

## 2022-12-16 PROCEDURE — 74011250636 HC RX REV CODE- 250/636: Performed by: ANESTHESIOLOGY

## 2022-12-16 PROCEDURE — 76060000032 HC ANESTHESIA 0.5 TO 1 HR: Performed by: OBSTETRICS & GYNECOLOGY

## 2022-12-16 PROCEDURE — 76210000021 HC REC RM PH II 0.5 TO 1 HR: Performed by: OBSTETRICS & GYNECOLOGY

## 2022-12-16 PROCEDURE — 77030041423 HC SYST FLUID MNGMT FLUENT HOLO -D: Performed by: OBSTETRICS & GYNECOLOGY

## 2022-12-16 PROCEDURE — 77030034928 HC DEV UTER SURSND KT HOLO -G1: Performed by: OBSTETRICS & GYNECOLOGY

## 2022-12-16 PROCEDURE — 36415 COLL VENOUS BLD VENIPUNCTURE: CPT

## 2022-12-16 PROCEDURE — 76210000017 HC OR PH I REC 1.5 TO 2 HR: Performed by: OBSTETRICS & GYNECOLOGY

## 2022-12-16 PROCEDURE — 77030020143 HC AIRWY LARYN INTUB CGAS -A: Performed by: ANESTHESIOLOGY

## 2022-12-16 PROCEDURE — 2709999900 HC NON-CHARGEABLE SUPPLY: Performed by: OBSTETRICS & GYNECOLOGY

## 2022-12-16 PROCEDURE — 77030040922 HC BLNKT HYPOTHRM STRY -A

## 2022-12-16 PROCEDURE — 76010000138 HC OR TIME 0.5 TO 1 HR: Performed by: OBSTETRICS & GYNECOLOGY

## 2022-12-16 PROCEDURE — 81025 URINE PREGNANCY TEST: CPT

## 2022-12-16 PROCEDURE — 74011000250 HC RX REV CODE- 250: Performed by: ANESTHESIOLOGY

## 2022-12-16 PROCEDURE — 85027 COMPLETE CBC AUTOMATED: CPT

## 2022-12-16 RX ORDER — SODIUM CHLORIDE 0.9 % (FLUSH) 0.9 %
5-40 SYRINGE (ML) INJECTION EVERY 8 HOURS
Status: DISCONTINUED | OUTPATIENT
Start: 2022-12-16 | End: 2022-12-16 | Stop reason: HOSPADM

## 2022-12-16 RX ORDER — LIDOCAINE HYDROCHLORIDE 10 MG/ML
0.1 INJECTION, SOLUTION EPIDURAL; INFILTRATION; INTRACAUDAL; PERINEURAL AS NEEDED
Status: DISCONTINUED | OUTPATIENT
Start: 2022-12-16 | End: 2022-12-16 | Stop reason: HOSPADM

## 2022-12-16 RX ORDER — IBUPROFEN 800 MG/1
800 TABLET ORAL
Qty: 30 TABLET | Refills: 0 | Status: SHIPPED | OUTPATIENT
Start: 2022-12-16 | End: 2023-01-15

## 2022-12-16 RX ORDER — FLUMAZENIL 0.1 MG/ML
0.2 INJECTION INTRAVENOUS
Status: DISCONTINUED | OUTPATIENT
Start: 2022-12-16 | End: 2022-12-16 | Stop reason: HOSPADM

## 2022-12-16 RX ORDER — SODIUM CHLORIDE, SODIUM LACTATE, POTASSIUM CHLORIDE, CALCIUM CHLORIDE 600; 310; 30; 20 MG/100ML; MG/100ML; MG/100ML; MG/100ML
125 INJECTION, SOLUTION INTRAVENOUS CONTINUOUS
Status: DISCONTINUED | OUTPATIENT
Start: 2022-12-16 | End: 2022-12-16 | Stop reason: HOSPADM

## 2022-12-16 RX ORDER — HYDROMORPHONE HYDROCHLORIDE 1 MG/ML
.25-1 INJECTION, SOLUTION INTRAMUSCULAR; INTRAVENOUS; SUBCUTANEOUS
Status: DISCONTINUED | OUTPATIENT
Start: 2022-12-16 | End: 2022-12-16 | Stop reason: HOSPADM

## 2022-12-16 RX ORDER — PROPOFOL 10 MG/ML
INJECTION, EMULSION INTRAVENOUS AS NEEDED
Status: DISCONTINUED | OUTPATIENT
Start: 2022-12-16 | End: 2022-12-16 | Stop reason: HOSPADM

## 2022-12-16 RX ORDER — DEXAMETHASONE SODIUM PHOSPHATE 4 MG/ML
INJECTION, SOLUTION INTRA-ARTICULAR; INTRALESIONAL; INTRAMUSCULAR; INTRAVENOUS; SOFT TISSUE AS NEEDED
Status: DISCONTINUED | OUTPATIENT
Start: 2022-12-16 | End: 2022-12-16 | Stop reason: HOSPADM

## 2022-12-16 RX ORDER — LIDOCAINE HYDROCHLORIDE 20 MG/ML
INJECTION, SOLUTION EPIDURAL; INFILTRATION; INTRACAUDAL; PERINEURAL AS NEEDED
Status: DISCONTINUED | OUTPATIENT
Start: 2022-12-16 | End: 2022-12-16 | Stop reason: HOSPADM

## 2022-12-16 RX ORDER — ONDANSETRON 2 MG/ML
INJECTION INTRAMUSCULAR; INTRAVENOUS AS NEEDED
Status: DISCONTINUED | OUTPATIENT
Start: 2022-12-16 | End: 2022-12-16 | Stop reason: HOSPADM

## 2022-12-16 RX ORDER — DIPHENHYDRAMINE HYDROCHLORIDE 50 MG/ML
12.5 INJECTION, SOLUTION INTRAMUSCULAR; INTRAVENOUS AS NEEDED
Status: DISCONTINUED | OUTPATIENT
Start: 2022-12-16 | End: 2022-12-16 | Stop reason: HOSPADM

## 2022-12-16 RX ORDER — SODIUM CHLORIDE 0.9 % (FLUSH) 0.9 %
5-40 SYRINGE (ML) INJECTION AS NEEDED
Status: DISCONTINUED | OUTPATIENT
Start: 2022-12-16 | End: 2022-12-16 | Stop reason: HOSPADM

## 2022-12-16 RX ORDER — ALBUTEROL SULFATE 0.83 MG/ML
2.5 SOLUTION RESPIRATORY (INHALATION) AS NEEDED
Status: DISCONTINUED | OUTPATIENT
Start: 2022-12-16 | End: 2022-12-16 | Stop reason: HOSPADM

## 2022-12-16 RX ORDER — ONDANSETRON 2 MG/ML
4 INJECTION INTRAMUSCULAR; INTRAVENOUS AS NEEDED
Status: DISCONTINUED | OUTPATIENT
Start: 2022-12-16 | End: 2022-12-16 | Stop reason: HOSPADM

## 2022-12-16 RX ORDER — FENTANYL CITRATE 50 UG/ML
INJECTION, SOLUTION INTRAMUSCULAR; INTRAVENOUS AS NEEDED
Status: DISCONTINUED | OUTPATIENT
Start: 2022-12-16 | End: 2022-12-16 | Stop reason: HOSPADM

## 2022-12-16 RX ORDER — MIDAZOLAM HYDROCHLORIDE 1 MG/ML
INJECTION, SOLUTION INTRAMUSCULAR; INTRAVENOUS AS NEEDED
Status: DISCONTINUED | OUTPATIENT
Start: 2022-12-16 | End: 2022-12-16 | Stop reason: HOSPADM

## 2022-12-16 RX ORDER — NALOXONE HYDROCHLORIDE 0.4 MG/ML
0.04 INJECTION, SOLUTION INTRAMUSCULAR; INTRAVENOUS; SUBCUTANEOUS
Status: DISCONTINUED | OUTPATIENT
Start: 2022-12-16 | End: 2022-12-16 | Stop reason: HOSPADM

## 2022-12-16 RX ORDER — HYDROCODONE BITARTRATE AND ACETAMINOPHEN 5; 325 MG/1; MG/1
1 TABLET ORAL
Qty: 6 TABLET | Refills: 0 | Status: SHIPPED | OUTPATIENT
Start: 2022-12-16 | End: 2022-12-19

## 2022-12-16 RX ORDER — FENTANYL CITRATE 50 UG/ML
25 INJECTION, SOLUTION INTRAMUSCULAR; INTRAVENOUS
Status: DISCONTINUED | OUTPATIENT
Start: 2022-12-16 | End: 2022-12-16 | Stop reason: HOSPADM

## 2022-12-16 RX ADMIN — HYDROMORPHONE HYDROCHLORIDE 0.5 MG: 1 INJECTION, SOLUTION INTRAMUSCULAR; INTRAVENOUS; SUBCUTANEOUS at 08:42

## 2022-12-16 RX ADMIN — ONDANSETRON HYDROCHLORIDE 4 MG: 2 SOLUTION INTRAMUSCULAR; INTRAVENOUS at 08:22

## 2022-12-16 RX ADMIN — DEXAMETHASONE SODIUM PHOSPHATE 4 MG: 4 INJECTION, SOLUTION INTRAMUSCULAR; INTRAVENOUS at 07:48

## 2022-12-16 RX ADMIN — SODIUM CHLORIDE, POTASSIUM CHLORIDE, SODIUM LACTATE AND CALCIUM CHLORIDE: 600; 310; 30; 20 INJECTION, SOLUTION INTRAVENOUS at 07:24

## 2022-12-16 RX ADMIN — HYDROMORPHONE HYDROCHLORIDE 0.5 MG: 1 INJECTION, SOLUTION INTRAMUSCULAR; INTRAVENOUS; SUBCUTANEOUS at 09:12

## 2022-12-16 RX ADMIN — FENTANYL CITRATE 50 MCG: 50 INJECTION, SOLUTION INTRAMUSCULAR; INTRAVENOUS at 08:07

## 2022-12-16 RX ADMIN — MIDAZOLAM HYDROCHLORIDE 2 MG: 1 INJECTION, SOLUTION INTRAMUSCULAR; INTRAVENOUS at 07:31

## 2022-12-16 RX ADMIN — FENTANYL CITRATE 50 MCG: 50 INJECTION, SOLUTION INTRAMUSCULAR; INTRAVENOUS at 07:41

## 2022-12-16 RX ADMIN — LIDOCAINE HYDROCHLORIDE 60 MG: 20 INJECTION, SOLUTION EPIDURAL; INFILTRATION; INTRACAUDAL; PERINEURAL at 07:41

## 2022-12-16 RX ADMIN — PROPOFOL 200 MG: 10 INJECTION, EMULSION INTRAVENOUS at 07:41

## 2022-12-16 NOTE — ANESTHESIA POSTPROCEDURE EVALUATION
Procedure(s): HYSTEROSCOPY  NOVASURE ABLATION. general    Anesthesia Post Evaluation      Multimodal analgesia: multimodal analgesia not used between 6 hours prior to anesthesia start to PACU discharge  Patient location during evaluation: PACU  Patient participation: complete - patient participated  Level of consciousness: awake  Pain management: adequate  Airway patency: patent  Anesthetic complications: no  Cardiovascular status: acceptable, blood pressure returned to baseline and hemodynamically stable  Respiratory status: acceptable  Hydration status: acceptable  Post anesthesia nausea and vomiting:  controlled  Final Post Anesthesia Temperature Assessment:  Normothermia (36.0-37.5 degrees C)      INITIAL Post-op Vital signs:   Vitals Value Taken Time   /75 12/16/22 1003   Temp 36.7 °C (98.1 °F) 12/16/22 0915   Pulse 62 12/16/22 1008   Resp 16 12/16/22 1008   SpO2 95 % 12/16/22 1008   Vitals shown include unvalidated device data.

## 2022-12-16 NOTE — ANESTHESIA PREPROCEDURE EVALUATION
Relevant Problems   RESPIRATORY SYSTEM   (+) Cigarette smoker   (+) CERVANTES (dyspnea on exertion)      NEUROLOGY   (+) Anxiety with depression      CARDIOVASCULAR   (+) Hypertension       Anesthetic History   No history of anesthetic complications            Review of Systems / Medical History  Patient summary reviewed and pertinent labs reviewed    Pulmonary          Smoker  Asthma        Neuro/Psych   Within defined limits           Cardiovascular    Hypertension              Exercise tolerance: >4 METS     GI/Hepatic/Renal  Within defined limits              Endo/Other      Hypothyroidism  Morbid obesity     Other Findings              Physical Exam    Airway  Mallampati: III  TM Distance: 4 - 6 cm  Neck ROM: normal range of motion   Mouth opening: Normal     Cardiovascular    Rhythm: regular  Rate: normal         Dental    Dentition: Upper dentition intact and Lower dentition intact     Pulmonary  Breath sounds clear to auscultation               Abdominal         Other Findings            Anesthetic Plan    ASA: 2  Anesthesia type: general          Induction: Intravenous  Anesthetic plan and risks discussed with: Patient

## 2022-12-16 NOTE — DISCHARGE INSTRUCTIONS
DISCHARGE SUMMARY from your Nurse      PATIENT INSTRUCTIONS    After general anesthesia or intravenous sedation, for 24 hours or while taking prescription Narcotics:  Limit your activities  Do not drive and operate hazardous machinery  Do not make important personal or business decisions  Do  not drink alcoholic beverages  If you have not urinated within 8 hours after discharge, please contact your surgeon on call. Report the following to your surgeon:  Excessive pain, swelling, redness or odor of or around the surgical area  Temperature over 100.5  Nausea and vomiting lasting longer than 4 hours or if unable to take medications  Any signs of decreased circulation or nerve impairment to extremity: change in color, persistent  numbness, tingling, coldness or increase pain  Any questions      GOOD HELP TO FIGHT AN INFECTION  Here are a few tip to help reduce the chance of getting an infection after surgery:  Wash Your Hands  Good handwashing is the most important thing you and your caregiver can do. Wash before and after caring for any wounds. Dry your hand with a clean towel. Wash with soap and water for at least 20 seconds. A TIP: sing the \"Happy Birthday\" song through one time while washing to help with the timing. Use a hand  in between washings. Shower  When your surgeon says it is OK to take a shower, use a new bar of antibacterial soap (if that is what you use, and keep that bar of soap ONLY for your use), or antibacterial body wash. Use a clean wash cloth or sponge when you bathe. Dry off with a clean towel  after every bath - be careful around any wounds, skin staples, sutures or surgical glue over/on wounds. Do not enter swimming pools, hot tubs, lakes, rivers and/or ocean until wounds are healed and your doctor/surgeon says it is OK. Use Clean Sheets  Sleep on freshly laundered sheets after your surgery.   Keep the surgery site covered with a clean, dry bandage (if instructed to do so).  If the bandage becomes soiled, reapply a new, dry, clean bandage. Do not allow pets to sleep with you while your wound is healing. Lifestyle Modification and Controlling Your Blood Sugar  Smoking slows wound healing. Stop smoking and limit exposure to second-hand smoke. High blood sugar slows wound healing. Eat a well-balanced diet to provide proper nutrition while healing  Monitor your blood sugar (if you are a diabetic) and take your medications as you are suppose to so you can control you blood sugar after surgery. COUGH AND DEEP BREATHE    Breathing deeply and coughing are very important exercises to do after surgery. Deep breathing and coughing open the little air tubes and air sacks in your lungs. You take deep breaths every day. You may not even notice - it is just something you do when you sigh or yawn. It is a natural exercise you do to keep these air passages open. After surgery, take deep breaths and cough, on purpose. DIRECTIONS:  Take 10 to 15 slow deep breaths every hour while awake. Breathe in deeply, and hold it for 2 seconds. Exhale slowly through puckered lips, like blowing up a balloon. After every 4th or 5th deep breath, hug your pillow to your chest or belly and give a hard, deep cough. Yes, it will probably hurt. But doing this exercise is a very important part of healing after surgery. Take your pain medicine to help you do this exercise without too much pain. Coughing and deep breathing help prevent bronchitis and pneumonia after surgery. If you had chest or belly surgery, use a pillow as a \"hug marilee\" and hold it tightly to your chest or belly when you cough. ANKLE PUMPS    Ankle pumps increase the circulation of oxygenated blood to your lower extremities and decrease your risk for circulation problems such as blood clots.  They also stretch the muscles, tendons and ligaments in your foot and ankle, and prevent joint contracture in the ankle and foot, especially after surgeries on the legs. It is important to do ankle pump exercises regularly after surgery because immobility increases your risk for developing a blood clot. Your doctor may also have you take an Aspirin for the next few days as well. If your doctor did not ask you to take an Aspirin, consult with him before starting Aspirin therapy on your own. The exercise is quite simple. Slowly point your foot forward, feeling the muscles on the top of your lower leg stretch, and hold this position for 5 seconds. Next, pull your foot back toward you as far as possible, stretching the calf muscles, and hold that position for 5 seconds. Repeat with the other foot. Perform 10 repetitions every hour while awake for both ankles if possible (down and then up with the foot once is one repetition). You should feel gentle stretching of the muscles in your lower leg when doing this exercise. If you feel pain, or your range of motion is limited, don't push too hard. Only go the limit your joint and muscles will let you go. If you have increasing pain, progressively worsening leg warmth or swelling, STOP the exercise and call your doctor. MEDICATION AND   SIDE EFFECT GUIDE    The [facility] MEDICATION AND SIDE EFFECT GUIDE was provided to the PATIENT AND CARE PROVIDER. Information provided includes instruction about drug purpose and common side effects for the following medications:   HYDROcodone-acetaminophen (NORCO)   ibuprofen (MOTRIN)         These are general instructions for a healthy lifestyle:    *   Please give a list of your current medications to your Primary Care Provider. *   Please update this list whenever your medications are discontinued, doses are changed, or new medications (including over-the-counter products) are added. *   Please carry medication information at all times in case of emergency situations.       About Smoking  No smoking / No tobacco products  Avoid exposure to second hand smoke     Surgeon General's Warning:  Quitting smoking now greatly reduces serious risk to your health. Obesity, smoking, and sedentary lifestyle greatly increases your risk for illness and disease. A healthy diet, regular physical exercise & weight monitoring are important for maintaining a healthy lifestyle. Congestive Heart Failure  You may be retaining fluid if you have a history of heart failure or if you experience any of the following symptoms:  Weight gain of 3 pounds or more overnight or 5 pounds in a week, increased swelling in your hands or feet or shortness of breath while lying flat in bed. Please call your doctor as soon as you notice any of these symptoms; do not wait until your next office visit. Recognize signs and symptoms of STROKE:  F -  Face looks uneven  A -  Arms unable to move or move evenly  S -  Speech slurred or non-existent  T -  Time-call 911 as soon as signs and symptoms begin-DO NOT go          back to bed or wait to see if you get better-TIME IS BRAIN. Warning Signs of HEART ATTACK   Call 911 if you have these symptoms:    Chest discomfort. Most heart attacks involve discomfort in the center of the chest that lasts more than a few minutes, or that goes away and comes back. It can feel like uncomfortable pressure, squeezing, fullness, or pain. Discomfort in other areas of the upper body. Symptoms can include pain or discomfort in one or both arms, the back, neck, jaw, or stomach. Shortness of breath with or without chest discomfort. Other signs may include breaking out in a cold sweat, nausea, or lightheadedness. Don't wait more than five minutes to call 911 - MINUTES MATTER! Fast action can save your life. Calling 911 is almost always the fastest way to get lifesaving treatment.  Emergency Medical Services staff can begin treatment when they arrive -- up to an hour sooner than if someone gets to the hospital by car. Learning About Coronavirus [phone])  Coronavirus [phone]): Overview  What is coronavirus (COVID-19)? The coronavirus disease (COVID-19) is caused by a virus. It is an illness that was first found in [place], [place], in December 2019. It has since spread worldwide. The virus can cause fever, cough, and trouble breathing. In severe cases, it can cause pneumonia and make it hard to breathe without help. It can cause death. Coronaviruses are a large group of viruses. They cause the common cold. They also cause more serious illnesses like Middle East respiratory syndrome (MERS) and severe acute respiratory syndrome (SARS). COVID-19 is caused by a novel coronavirus. That means it's a new type that has not been seen in people before. This virus spreads person-to-person through droplets from coughing and sneezing. It can also spread when you are close to someone who is infected. And it can spread when you touch something that has the virus on it, such as a doorknob or a tabletop. What can you do to protect yourself from coronavirus (COVID-19)? The best way to protect yourself from getting sick is to: Avoid areas where there is an outbreak. Avoid contact with people who may be infected. Wash your hands often with soap or alcohol-based hand sanitizers. Avoid crowds and try to stay at least 6 feet away from other people. Wash your hands often, especially after you cough or sneeze. Use soap and water, and scrub for at least 20 seconds. If soap and water aren't available, use an alcohol-based hand . Avoid touching your mouth, nose, and eyes. What can you do to avoid spreading the virus to others? To help avoid spreading the virus to others:  Cover your mouth with a tissue when you cough or sneeze. Then throw the tissue in the trash. Use a disinfectant to clean things that you touch often. Stay home if you are sick or have been exposed to the virus.  Don't go to school, work, or public areas. And don't use public transportation. If you are sick:  Leave your home only if you need to get medical care. But call the doctor's office first so they know you're coming. And wear a face mask, if you have one. If you have a face mask, wear it whenever you're around other people. It can help stop the spread of the virus when you cough or sneeze. Clean and disinfect your home every day. Use household  and disinfectant wipes or sprays. Take special care to clean things that you grab with your hands. These include doorknobs, remote controls, phones, and handles on your refrigerator and microwave. And don't forget countertops, tabletops, bathrooms, and computer keyboards. When to call for help  Call 911 anytime you think you may need emergency care. For example, call if:  You have severe trouble breathing. (You can't talk at all.)  You have constant chest pain or pressure. You are severely dizzy or lightheaded. You are confused or can't think clearly. Your face and lips have a blue color. You pass out (lose consciousness) or are very hard to wake up. Call your doctor now if you develop symptoms such as:  Shortness of breath. Fever. Cough. If you need to get care, call ahead to the doctor's office for instructions before you go. Make sure you wear a face mask, if you have one, to prevent exposing other people to the virus. Where can you get the latest information? The following health organizations are tracking and studying this virus. Their websites contain the most up-to-date information. Aubrey Henriquez also learn what to do if you think you may have been exposed to the virus. U.S. Centers for Disease Control and Prevention (CDC): The CDC provides updated news about the disease and travel advice. The website also tells you how to prevent the spread of infection. www.cdc.gov  World Health Organization [facility]): WHO offers information about the virus outbreaks.  WHO also has travel advice. www.who.int  Current as of: April 1, 2020               Content Version: 12.4  © 1663-7676 [employer]. Care instructions adapted under license by your healthcare professional. If you have questions about a medical condition or this instruction, always ask your healthcare professional. [address] any warranty or liability for your use of this information. The discharge information has been reviewed with the sister. Any questions and concerns from the sister have been addressed. The sister verbalized understanding. CONTENTS FOUND IN YOUR DISCHARGE ENVELOPE:  [x]     Surgeon and Hospital Discharge Instructions  [x]     [facility] Surgical Services Care Provider Card  [x]     Medication & Side Effect Guide            (your newly prescribed medications have been marked/highlighted showing the most common side effects from   the classes of drugs on your prescriptions)  [x]     Medication Prescription(s) x 2 ( [x] These have been sent electronically to your pharmacy by your surgeon,   - OR -       your surgeon has already provided these to you during a previous/pre-op office visit)  []     [address]  []     Physical Therapy Prescription  []     Follow-up Appointment Cards  []     Surgery-related Pictures/Media  []     Pain block and/or block with On-Q Catheter from Anesthesia Service (information included in your instructions above)  []     Medical device information sheets/pamphlets from their    []     School/work excuse note. []     /parent work excuse note. The following personal items collected during your admission are returned to you:   Dental Appliance: Dental Appliances: None  Vision: Visual Aid: None  Hearing Aid:    Jewelry: Jewelry: None  Clothing: Clothing: Other (comment) (placed in pacu locker)  Other Valuables:  Other Valuables: None  Valuables sent to safe:

## 2022-12-16 NOTE — OP NOTES
[address]    OPERATIVE REPORT  Hysteroscopy/D+C/Novasure endometrial ablation    NAME: Todd Chanel    AGE: 30 y.o. YOB: 1985    MEDICAL RECORD NUMBER: [id number]    DATE OF SURGERY: 12/16/2022    PREOPERATIVE DIAGNOSIS: Menorrhagia    POSTOPERATIVE DIAGNOSIS: Menorrhagia    OPERATIVE PROCEDURE:   1. Hysteroscopy  2. Novasure endometrial ablation     SURGEON: Jayme Way MD    Assistant:  staff    ANESTHESIA: General    IMPLANTS: None    COMPLICATIONS: None     ESTIMATED BLOOD LOSS: * No values recorded between 12/16/2022 12:00 AM and 12/16/2022  7:35 AM *    INDICATION FOR PROCEDURE: This is a 30 y.o. female with a history of excessive bleeding. She presents for hysteroscopy and Novasure endometrial ablation. Informed consent was obtained and the patient stated she has no other questions. PROCEDURE: The patient was prepped and draped in the dorsal lithotomy position after institution of general anesthesia. The cervix was normal, was grasped with a single toothed tenaculum, and was easily sounded to 10 cm. The endocervix was measured to be  4.0 cm. The cervix was dilated and then the hysteroscope was introduced. The endometrial cavity was inspected. There was no abnormality seen. The scope was removed. The Novasure device was set at 6.0 cm depth and inserted into the uterine cavity. The width achieved was 4.5 cm. This resulted in a power level of 149w. Cavity integrity passed on the first attempt. The device was activated. Treatment cycle was 87 seconds. There were no problems. The device was removed from the endometrial cavity and the scope re-inserted. All quadrants were fully treated. The scope was removed. Fluid deficit was 120cc. The procedure was terminated. Total blood loss was negligible. The patient was awakened and taken to the recovery room in good condition. All counts were correct x2.       Signed By:  Jayme Way MD December 16, 2022

## 2022-12-16 NOTE — DISCHARGE SUMMARY
Gynecology Surgical Discharge Summary     Name: Surendra Butler MRN: [id number]  SSN: [id number]    YOB: 1985  Age: 30 y.o. Sex: female      Admit date: 12/16/2022    Discharge Date: 12/16/2022      Attending Physician: Jimi Zhong MD     Admission Diagnoses: MENORRHAGIA    Discharge Diagnoses: MENORRHAGIA     Procedures: Procedure(s): HYSTEROSCOPY DILATION AND CURETTAGE NOVASURE ABLATION  . Hospital Course: Normal hospital course for this procedure. The patient was released to her home in good condition. Significant Diagnostic Studies:   Recent Results (from the past 24 hour(s))   CBC W/O DIFF    Collection Time: 12/16/22  6:37 AM   Result Value Ref Range    WBC 6.0 3.6 - 11.0 K/uL    RBC 3.76 (L) 3.80 - 5.20 M/uL    HGB 10.7 (L) 11.5 - 16.0 g/dL    HCT 33.1 (L) 35.0 - 47.0 %    MCV 88.0 80.0 - 99.0 FL    MCH 28.5 26.0 - 34.0 PG    MCHC 32.3 30.0 - 36.5 g/dL    RDW 13.1 11.5 - 14.5 %    PLATELET 033 116 - 299 K/uL    MPV 10.1 8.9 - 12.9 FL    NRBC 0.0 0  WBC    ABSOLUTE NRBC 0.00 0.00 - 0.01 K/uL   SAMPLES BEING HELD    Collection Time: 12/16/22  6:46 AM   Result Value Ref Range    SAMPLES BEING HELD 1SST     COMMENT        Add-on orders for these samples will be processed based on acceptable specimen integrity and analyte stability, which may vary by analyte. HCG URINE, QL. - POC    Collection Time: 12/16/22  7:29 AM   Result Value Ref Range    Pregnancy test,urine (POC) Negative NEG         Patient Instructions:     Diet, activity, wound care: See printed instructions. I spent 10 minutes discharging the patient in face to face contact.     Follow-up Appointments   Procedures    FOLLOW UP VISIT Appointment in: One Month Post op     Post op     Standing Status:   Standing     Number of Occurrences:   1     Order Specific Question:   Appointment in     Answer:   One Month        Signed By:  Jimi Zhong MD     December 16, 2022

## 2022-12-20 RX ORDER — ERGOCALCIFEROL 1.25 MG/1
CAPSULE ORAL
Qty: 4 CAPSULE | Refills: 1 | Status: SHIPPED | OUTPATIENT
Start: 2022-12-20

## 2023-01-12 NOTE — PROGRESS NOTES
Severa Rex is a 30 y.o. female presents for a problem visit. Chief Complaint   Patient presents with    Post OP Follow Up     No LMP recorded. Patient has had an implant. Birth Control: Nexplanon. Last Pap: normal obtained 3/15/2022. The patient is reporting having: Post-Op for 1. Hysteroscopy  2. Novasure endometrial ablation on 12/16/2022 .        1. Have you been to the ER, urgent care clinic, or hospitalized since your last visit? No    2. Have you seen or consulted any other health care providers outside of the [address] [place] [address] since your last visit?  No    Examination chaperoned by Anatoliy Posey MA.

## 2023-01-13 ENCOUNTER — OFFICE VISIT (OUTPATIENT)
Dept: OBGYN CLINIC | Age: 38
End: 2023-01-13
Payer: COMMERCIAL

## 2023-01-13 VITALS
SYSTOLIC BLOOD PRESSURE: 141 MMHG | DIASTOLIC BLOOD PRESSURE: 83 MMHG | HEIGHT: 65 IN | WEIGHT: 272.4 LBS | BODY MASS INDEX: 45.38 KG/M2

## 2023-01-13 DIAGNOSIS — Z97.5 NEXPLANON IN PLACE: ICD-10-CM

## 2023-01-13 DIAGNOSIS — Z30.46 ENCOUNTER FOR NEXPLANON REMOVAL: ICD-10-CM

## 2023-01-13 DIAGNOSIS — Z09 POSTOP CHECK: Primary | ICD-10-CM

## 2023-01-13 RX ORDER — NORETHINDRONE ACETATE AND ETHINYL ESTRADIOL 1MG-20(21)
1 KIT ORAL DAILY
Qty: 28 TABLET | Refills: 11 | Status: SHIPPED | OUTPATIENT
Start: 2023-01-13 | End: 2023-02-10

## 2023-01-13 NOTE — PROGRESS NOTES
Procedure note: Nexplanon/Implanon removal    Boris Rodriguez is a [place] [address],  30 y.o. female whose No LMP recorded. Patient has had an implant. .  She presents for office removal of a NEXPLANON/IMPLANON sub-dermal contraceptive implant. Procedure:  She was positioned so the site of her implant was visable and easily accessible. The implant was located by palpation. The end of the implant nearest the elbow was marked with a sterile marker. The operative site was cleansed with Betadine. Using a 27 gauge needle on a 3cc syringe and 1% lidocaine, 1 cc were infiltrated as a intradermal wheal and underneath the scar from the placement of Nexplanon. The Nexplanon could not be located externally or internally. The skin was cleansed and dried. A steristrip was applied then topped with a folded 4x4 gauze and a Curlex pressure dressing. There were no complication or problems. She demonstrated full active range of movement of her elbow, wrist, all five digits and denied numbness and tingling. Post-procedure:  She was told to remove the dressing in 12-24 hours, to keep the incision area dry for 24 hours and to remove the Steristrip in 5-7 days. She was given our 24-hour phone number and encouraged to call if there are any problems. See progress note for family planning consultation.    We will order an xray of her arm for aid in locating nexplanon

## 2023-01-13 NOTE — PROGRESS NOTES
Postop Evaluation    Galen Aranda is a 30 y.o. female returns for a routine post-operative follow-up visit following Hysteroscopy/Novasure . Past Surgical History:   Procedure Laterality Date    HX HYSTEROSCOPY WITH ENDOMETRIAL ABLATION  12/16/2022    Novasure    HX THYROIDECTOMY  07/2021    Large goiter couldn't swallow. She is still bleeding. Not heavily but it hasn't stopped. She thinks due to Nexplanon and wants it removed. PHYSICAL EXAMINATION    Gastrointestinal  Abdominal Examination: abdomen non-tender to palpation, incisions healing well, normal bowel sounds, no masses present  Liver and spleen: no hepatomegaly present, spleen not palpable  Hernias: no hernias identified    Genitourinary  External Genitalia: normal appearance for age, no discharge present, no tenderness present, no inflammatory lesions present, no masses present, no atrophy present  Vagina: normal vaginal vault without central or paravaginal defects, bloody discharge present, no inflammatory lesions present, no masses present, vaginal cuff in tact  Bladder: non-tender to palpation  Urethra: appears normal  Cervix: absent   Uterus: absent  Adnexa: no adnexal tenderness present, no adnexal masses present  Perineum: perineum within normal limits, no evidence of trauma, no rashes or skin lesions present  Skin  General Inspection: no rash, no lesions identified    Neurologic/Psychiatric  Mental Status:  Orientation: grossly oriented to person, place and time  Mood and Affect: mood normal, affect appropriate    Assessment:    ICD-10-CM ICD-9-CM    1. Postop check  Z09 V67.00       2. Encounter for Nexplanon removal  Z30.46 V25.43       3.  Nexplanon in place  Z97.5 V45.52 XR HUMERUS LT          Plan:  Return for Annual.

## 2023-01-20 ENCOUNTER — TELEPHONE (OUTPATIENT)
Dept: OBGYN CLINIC | Age: 38
End: 2023-01-20

## 2023-01-20 NOTE — TELEPHONE ENCOUNTER
30year old patient last seen in the office on 1/13/2023 and is calling about order to have her left arm x rayed to find her nexplanon    Patient was  advised that order for x ray has been placed and patient was provided the central scheduling phone number to call to set up the appointment  Patient verbalized understanding.

## 2023-01-24 ENCOUNTER — HOSPITAL ENCOUNTER (OUTPATIENT)
Dept: GENERAL RADIOLOGY | Age: 38
Discharge: HOME | End: 2023-01-24
Payer: COMMERCIAL

## 2023-01-24 DIAGNOSIS — Z97.5 NEXPLANON IN PLACE: ICD-10-CM

## 2023-01-24 PROCEDURE — 73060 X-RAY EXAM OF HUMERUS: CPT

## 2023-02-13 ENCOUNTER — OFFICE VISIT (OUTPATIENT)
Dept: OBGYN CLINIC | Age: 38
End: 2023-02-13
Payer: COMMERCIAL

## 2023-02-13 VITALS
WEIGHT: 270.8 LBS | BODY MASS INDEX: 45.12 KG/M2 | HEIGHT: 65 IN | DIASTOLIC BLOOD PRESSURE: 113 MMHG | SYSTOLIC BLOOD PRESSURE: 154 MMHG

## 2023-02-13 DIAGNOSIS — Z30.46 ENCOUNTER FOR NEXPLANON REMOVAL: Primary | ICD-10-CM

## 2023-02-13 RX ORDER — NORETHINDRONE ACETATE AND ETHINYL ESTRADIOL AND FERROUS FUMARATE 1MG-20(21)
KIT ORAL
COMMUNITY
Start: 2023-02-10

## 2023-02-13 NOTE — PROGRESS NOTES
Procedure note: Nexplanon/Implanon removal    Zabrina Blanc is a [place] [address],  30 y.o. female whose No LMP recorded. Patient has had an implant. .  She presents for office removal of a NEXPLANON/IMPLANON sub-dermal contraceptive implant. Was unsuccessful last time. Xray obtained of left humerus. Apparent location identified as ~2cm beneath surface ~3cm from edge of elbow     Procedure:  She was positioned so the site of her implant was visable and easily accessible. The implant was located by palpation. The end of the implant nearest the elbow was marked with a sterile marker. The operative site was cleansed with Betadine. Using a 27 gauge needle on a 3cc syringe and 1% lidocaine, 1 cc were infiltrated as a intradermal wheal and underneath the end of the implant closest to the elbow. Downward pressure was applied on the end of the implant nearest the axilla and a 2-3mm incision was made in the longitudinal direction of the arm at the tip of the implant closest to the elbow. The implant was then pushed gently toward the incision until the tip was visible. The fibrous capsule was opened with a combination of blunt and sharp dissection. The implant was grasped with mosquito forceps and removed intact. It measured a full 4 cm in length. The skin was cleansed and dried. A steristrip was applied then topped with a folded 4x4 gauze and a Curlex pressure dressing. There were no complication or problems. She demonstrated full active range of movement of her elbow, wrist, all five digits and denied numbness and tingling. Post-procedure:  She was told to remove the dressing in 12-24 hours, to keep the incision area dry for 24 hours and to remove the Steristrip in 5-7 days. She was given our 24-hour phone number and encouraged to call if there are any problems. See progress note for family planning consultation.

## 2023-02-13 NOTE — PROGRESS NOTES
Brii Briggs is a 30 y.o. female presents for a problem visit. Chief Complaint   Patient presents with    Nexplanon     removal       Problems:  removal of Nexplanon. Left arm. No LMP recorded. Patient has had an implant. Birth Control: Nexplanon. Last Pap: normal obtained 3/15/2022.        1. Have you been to the ER, urgent care clinic, or hospitalized since your last visit? No    2. Have you seen or consulted any other health care providers outside of the [address] [place] [address] since your last visit?  No        Chart reviewed for the following:   Elen SOLOMON [place], have reviewed the History, Physical and updated the Allergic reactions for [address] [place] performed immediately prior to start of procedure:   Elen SOLOMON [place], have performed the following reviews on Brii Briggs prior to the start of the procedure:            * Patient was identified by name and date of birth   * Agreement on procedure being performed was verified  * Risks and Benefits explained to the patient  * Procedure site verified and marked as necessary  * Patient was positioned for comfort  * Consent was signed and verified     Time: 11:55am    Date of procedure: 2/13/2023    Procedure performed by:  Ann Smith MD    Provider assisted by: Trinity Deras MA    Patient assisted by: self    How tolerated by patient: tolerated the procedure well with no complications    Post Procedural Pain Scale: 2 - Hurts Little Bit    Comments: none    Examination chaperoned by Elen Hsu MA.

## 2023-03-27 RX ORDER — ETHYNODIOL DIACETATE AND ETHINYL ESTRADIOL 1 MG-35MCG
KIT ORAL
Qty: 28 TABLET | Refills: 3 | Status: SHIPPED | OUTPATIENT
Start: 2023-03-27

## 2023-05-03 ENCOUNTER — TELEPHONE (OUTPATIENT)
Dept: OBGYN CLINIC | Age: 38
End: 2023-05-03

## 2023-05-03 DIAGNOSIS — R10.2 PELVIC PAIN: Primary | ICD-10-CM

## 2023-05-04 ENCOUNTER — OFFICE VISIT (OUTPATIENT)
Dept: OBGYN CLINIC | Age: 38
End: 2023-05-04

## 2023-05-04 VITALS
WEIGHT: 271.6 LBS | BODY MASS INDEX: 45.25 KG/M2 | SYSTOLIC BLOOD PRESSURE: 155 MMHG | DIASTOLIC BLOOD PRESSURE: 91 MMHG | HEIGHT: 65 IN

## 2023-05-04 DIAGNOSIS — N99.85 POST ENDOMETRIAL ABLATION SYNDROME: ICD-10-CM

## 2023-05-04 DIAGNOSIS — R10.2 PELVIC PAIN IN FEMALE: Primary | ICD-10-CM

## 2023-05-04 PROCEDURE — 99214 OFFICE O/P EST MOD 30 MIN: CPT | Performed by: OBSTETRICS & GYNECOLOGY

## 2023-05-04 PROCEDURE — 3080F DIAST BP >= 90 MM HG: CPT | Performed by: OBSTETRICS & GYNECOLOGY

## 2023-05-04 PROCEDURE — 3077F SYST BP >= 140 MM HG: CPT | Performed by: OBSTETRICS & GYNECOLOGY

## 2023-05-04 RX ORDER — IBUPROFEN 800 MG/1
800 TABLET ORAL
Qty: 30 TABLET | Refills: 0 | Status: SHIPPED | OUTPATIENT
Start: 2023-05-04 | End: 2023-06-03

## 2023-05-09 ENCOUNTER — PREP FOR PROCEDURE (OUTPATIENT)
Facility: HOSPITAL | Age: 38
End: 2023-05-09

## 2023-05-09 DIAGNOSIS — R10.2 PELVIC PAIN IN FEMALE: ICD-10-CM

## 2023-05-09 DIAGNOSIS — N99.85 POST ENDOMETRIAL ABLATION SYNDROME: Primary | ICD-10-CM

## 2023-05-18 ENCOUNTER — HOSPITAL ENCOUNTER (OUTPATIENT)
Facility: HOSPITAL | Age: 38
Discharge: HOME | End: 2023-05-18
Payer: COMMERCIAL

## 2023-05-18 VITALS
TEMPERATURE: 97.5 F | WEIGHT: 273 LBS | HEART RATE: 77 BPM | SYSTOLIC BLOOD PRESSURE: 158 MMHG | OXYGEN SATURATION: 99 % | BODY MASS INDEX: 43.87 KG/M2 | HEIGHT: 66 IN | DIASTOLIC BLOOD PRESSURE: 88 MMHG | RESPIRATION RATE: 18 BRPM

## 2023-05-18 LAB
ABO + RH BLD: NORMAL
ANION GAP SERPL CALC-SCNC: 2 MMOL/L (ref 5–15)
BASOPHILS # BLD: 0 K/UL (ref 0–0.1)
BASOPHILS NFR BLD: 1 % (ref 0–1)
BLOOD GROUP ANTIBODIES SERPL: NORMAL
BUN SERPL-MCNC: 11 MG/DL (ref 6–20)
BUN/CREAT SERPL: 15 (ref 12–20)
CALCIUM SERPL-MCNC: 9.2 MG/DL (ref 8.5–10.1)
CHLORIDE SERPL-SCNC: 103 MMOL/L (ref 97–108)
CO2 SERPL-SCNC: 31 MMOL/L (ref 21–32)
CREAT SERPL-MCNC: 0.74 MG/DL (ref 0.55–1.02)
DIFFERENTIAL METHOD BLD: NORMAL
EOSINOPHIL # BLD: 0.1 K/UL (ref 0–0.4)
EOSINOPHIL NFR BLD: 2 % (ref 0–7)
ERYTHROCYTE [DISTWIDTH] IN BLOOD BY AUTOMATED COUNT: 14.4 % (ref 11.5–14.5)
GLUCOSE SERPL-MCNC: 91 MG/DL (ref 65–100)
HCG SERPL QL: NEGATIVE
HCT VFR BLD AUTO: 35.3 % (ref 35–47)
HGB BLD-MCNC: 11.5 G/DL (ref 11.5–16)
IMM GRANULOCYTES # BLD AUTO: 0 K/UL (ref 0–0.04)
IMM GRANULOCYTES NFR BLD AUTO: 0 % (ref 0–0.5)
LYMPHOCYTES # BLD: 1.9 K/UL (ref 0.8–3.5)
LYMPHOCYTES NFR BLD: 33 % (ref 12–49)
MCH RBC QN AUTO: 27.6 PG (ref 26–34)
MCHC RBC AUTO-ENTMCNC: 32.6 G/DL (ref 30–36.5)
MCV RBC AUTO: 84.7 FL (ref 80–99)
MONOCYTES # BLD: 0.4 K/UL (ref 0–1)
MONOCYTES NFR BLD: 8 % (ref 5–13)
NEUTS SEG # BLD: 3.3 K/UL (ref 1.8–8)
NEUTS SEG NFR BLD: 56 % (ref 32–75)
NRBC # BLD: 0 K/UL (ref 0–0.01)
NRBC BLD-RTO: 0 PER 100 WBC
PLATELET # BLD AUTO: 286 K/UL (ref 150–400)
PMV BLD AUTO: 9.8 FL (ref 8.9–12.9)
POTASSIUM SERPL-SCNC: 3.2 MMOL/L (ref 3.5–5.1)
RBC # BLD AUTO: 4.17 M/UL (ref 3.8–5.2)
SODIUM SERPL-SCNC: 136 MMOL/L (ref 136–145)
SPECIMEN EXP DATE BLD: NORMAL
WBC # BLD AUTO: 5.7 K/UL (ref 3.6–11)

## 2023-05-18 PROCEDURE — 86850 RBC ANTIBODY SCREEN: CPT

## 2023-05-18 PROCEDURE — 80048 BASIC METABOLIC PNL TOTAL CA: CPT

## 2023-05-18 PROCEDURE — 86901 BLOOD TYPING SEROLOGIC RH(D): CPT

## 2023-05-18 PROCEDURE — 85025 COMPLETE CBC W/AUTO DIFF WBC: CPT

## 2023-05-18 PROCEDURE — 86900 BLOOD TYPING SEROLOGIC ABO: CPT

## 2023-05-18 PROCEDURE — 36415 COLL VENOUS BLD VENIPUNCTURE: CPT

## 2023-05-18 PROCEDURE — 84703 CHORIONIC GONADOTROPIN ASSAY: CPT

## 2023-05-18 RX ORDER — M-VIT,TX,IRON,MINS/CALC/FOLIC 27MG-0.4MG
2 TABLET ORAL DAILY
COMMUNITY

## 2023-05-18 RX ORDER — OXYCODONE HYDROCHLORIDE 5 MG/1
5 TABLET ORAL EVERY 6 HOURS PRN
Status: ON HOLD | COMMUNITY
End: 2023-05-25 | Stop reason: HOSPADM

## 2023-05-18 NOTE — PERIOP NOTE
[address] [facility] [address], [address] [facility]   MAIN OR                                  [phone]   MAIN PRE OP                          [phone]                                                                                AMBULATORY PRE OP          [phone]  PRE-ADMISSION TESTING    [phone]     Surgery Date:  Thursday 5/25       Is surgery arrival time given by surgeon? NO  If NO, [facility] staff will call you between 3 and 7pm the day before your surgery with your arrival time. (If your surgery is on a Monday, we will call you the Friday before.)    Call [phone] after 7pm Monday-Friday if you did not receive this call. INSTRUCTIONS BEFORE YOUR SURGERY   When You  Arrive Arrive at the 2nd [address] [facility] on the day of your surgery  Have your insurance card, photo ID, and any copayment (if needed)   Food   and   Drink NO food or drink after midnight the night before surgery    This means NO water, gum, mints, coffee, juice, etc.  No alcohol (beer, wine, liquor) 24 hours before and after surgery   Medications to   TAKE   Morning of Surgery MEDICATIONS TO TAKE THE MORNING OF SURGERY WITH A SIP OF WATER:   Levothyroxine   Medications  To  STOP      7 days before surgery Non-Steroidal anti-inflammatory Drugs (NSAID's): for example, Ibuprofen (Advil, Motrin), Naproxen (Aleve)  Aspirin, if taking for pain   Herbal supplements, vitamins, and fish oil  Other:  (Pain medications not listed above, including Tylenol may be taken)       Bathing Clothing  Jewelry  Valuables     If you shower the morning of surgery, please do not apply anything to your skin (lotions, powders, deodorant, or makeup, especially mascara)  Follow Chlorhexidine Care Fusion body wash instructions provided to you during PAT appointment. Begin 3 days prior to surgery.   Do not shave or trim anywhere 24 hours before surgery  Wear your hair loose or down; no

## 2023-05-18 NOTE — PERIOP NOTE
BMP/Potassium 3.2, reviewed by Arturo Mendez NP; plan: notify patient and verify that she is taking her K Rx BID as ordered. \"Voicemail not set up\". Result sent via EMR to surgeon and PCP with update as above. Will re-call patient tomorrow. Patient called us. Update given. States that she takes it once a day, believes the bottle says\"once a day\", however, she will check and if bottle says BID, she will start BID. Otherwise, will call her PCP with update and possibly new Rx. Instructed to call us if any questions or concerns.

## 2023-05-19 LAB
EKG ATRIAL RATE: 67 BPM
EKG DIAGNOSIS: NORMAL
EKG P AXIS: 37 DEGREES
EKG P-R INTERVAL: 234 MS
EKG Q-T INTERVAL: 428 MS
EKG QRS DURATION: 86 MS
EKG QTC CALCULATION (BAZETT): 452 MS
EKG R AXIS: 75 DEGREES
EKG T AXIS: 32 DEGREES
EKG VENTRICULAR RATE: 67 BPM

## 2023-05-24 ENCOUNTER — ANESTHESIA EVENT (OUTPATIENT)
Facility: HOSPITAL | Age: 38
End: 2023-05-24
Payer: COMMERCIAL

## 2023-05-24 RX ORDER — ONDANSETRON 2 MG/ML
4 INJECTION INTRAMUSCULAR; INTRAVENOUS
Status: CANCELLED | OUTPATIENT
Start: 2023-05-24 | End: 2023-05-25

## 2023-05-24 RX ORDER — SODIUM CHLORIDE, SODIUM LACTATE, POTASSIUM CHLORIDE, CALCIUM CHLORIDE 600; 310; 30; 20 MG/100ML; MG/100ML; MG/100ML; MG/100ML
INJECTION, SOLUTION INTRAVENOUS CONTINUOUS
Status: CANCELLED | OUTPATIENT
Start: 2023-05-24

## 2023-05-24 RX ORDER — DIPHENHYDRAMINE HYDROCHLORIDE 50 MG/ML
12.5 INJECTION INTRAMUSCULAR; INTRAVENOUS
Status: CANCELLED | OUTPATIENT
Start: 2023-05-24 | End: 2023-05-25

## 2023-05-24 NOTE — H&P
Gynecology History and Physical    Name: Tung Flores MRN: [id number] SSN: [id number]    YOB: 1985  Age: 30 y.o. Sex: female       Subjective:      Chief complaint: fibroids, menorrhagia    Tung Flores is 30y.o. year old Black / [address] female who presents for treatment of fibroids and menorrhagia with failed endometrial ablation. She is now admitted for outpatient surgery consisting of Procedure(s) (LRB):  ROBOTIC TOTAL LAPAROSCOPIC HYSTERECTOMY, BILATERAL SALPINGECTOMY (N/A). The current method of family planning is none. Problem List:  Patient Active Problem List   Diagnosis    Multinodular goiter    Edema of lower extremity    ISABEL (dyspnea on exertion)    Cigarette smoker    Morbid obesity with BMI of 40.0-44.9, adult (HCC)    Hip pain, chronic, right    Hypertension    Chlamydia    IGT (impaired glucose tolerance)    Thyroid nodule    Menorrhagia, premenopausal    H/O total thyroidectomy    Anxiety with depression    Abnormal uterine bleeding (AUB)    COVID-19 long hauler manifesting chronic loss of smell and taste    Fibroid    Pelvic pain in female    Post endometrial ablation syndrome     Past Medical History:   Diagnosis Date    Abnormal uterine bleeding (AUB) 06/27/2022    On Nexplanon  Also had trouble with hemorrage on Mirena/Removed    Anemia 01/2012    8.9  7/13 7.6    Anxiety and depression 05/18/2023    Atypical squamous cells of undetermined significance (ASCUS) on Papanicolaou smear of cervix 09/2013    Cancer ([address] 2019    Thyroid.  (no Chemo/Radiation needed)    Cervical high risk HPV (human papillomavirus) test positive 04/2021    TYPE 18    Chlamydia 2004    2015,  2018,  1/2022    Chronic bronchitis ([address]     COVID-19 long hauler manifesting chronic loss of smell and taste 07/01/2022    ISABEL (dyspnea on exertion) 06/29/2011 5/18/23 able to walk 1 flight stairs without sob/cp    Edema of lower extremity 06/29/2011    Fibroid 10/09/2022    1.6 cm

## 2023-05-25 ENCOUNTER — ANESTHESIA (OUTPATIENT)
Facility: HOSPITAL | Age: 38
End: 2023-05-25
Payer: COMMERCIAL

## 2023-05-25 ENCOUNTER — HOSPITAL ENCOUNTER (OUTPATIENT)
Facility: HOSPITAL | Age: 38
Setting detail: OUTPATIENT SURGERY
Discharge: HOME | End: 2023-05-25
Attending: OBSTETRICS & GYNECOLOGY | Admitting: OBSTETRICS & GYNECOLOGY
Payer: COMMERCIAL

## 2023-05-25 VITALS
TEMPERATURE: 98 F | HEIGHT: 66 IN | OXYGEN SATURATION: 99 % | WEIGHT: 271.61 LBS | HEART RATE: 56 BPM | BODY MASS INDEX: 43.65 KG/M2 | RESPIRATION RATE: 11 BRPM | SYSTOLIC BLOOD PRESSURE: 160 MMHG | DIASTOLIC BLOOD PRESSURE: 99 MMHG

## 2023-05-25 DIAGNOSIS — N99.85 POST ENDOMETRIAL ABLATION SYNDROME: Primary | ICD-10-CM

## 2023-05-25 DIAGNOSIS — G89.18 POST-OP PAIN: ICD-10-CM

## 2023-05-25 PROBLEM — R10.2 PELVIC PAIN IN FEMALE: Status: ACTIVE | Noted: 2023-05-25

## 2023-05-25 PROBLEM — Z90.79 STATUS POST TOTAL ABDOMINAL HYSTERECTOMY AND BILATERAL SALPINGO-OOPHORECTOMY (TAH-BSO): Status: ACTIVE | Noted: 2023-05-25

## 2023-05-25 PROBLEM — N92.4 MENORRHAGIA, PREMENOPAUSAL: Status: ACTIVE | Noted: 2019-05-09

## 2023-05-25 PROBLEM — Z90.722 STATUS POST TOTAL ABDOMINAL HYSTERECTOMY AND BILATERAL SALPINGO-OOPHORECTOMY (TAH-BSO): Status: ACTIVE | Noted: 2023-05-25

## 2023-05-25 PROBLEM — N93.9 ABNORMAL UTERINE BLEEDING (AUB): Status: ACTIVE | Noted: 2022-06-27

## 2023-05-25 PROBLEM — Z90.710 STATUS POST TOTAL ABDOMINAL HYSTERECTOMY AND BILATERAL SALPINGO-OOPHORECTOMY (TAH-BSO): Status: ACTIVE | Noted: 2023-05-25

## 2023-05-25 PROBLEM — U07.1 COVID-19 VIRUS INFECTION: Status: RESOLVED | Noted: 2021-06-28 | Resolved: 2023-05-25

## 2023-05-25 PROBLEM — D21.9 FIBROID: Status: ACTIVE | Noted: 2022-10-09

## 2023-05-25 LAB — HCG UR QL: NEGATIVE

## 2023-05-25 PROCEDURE — 81025 URINE PREGNANCY TEST: CPT

## 2023-05-25 PROCEDURE — 88307 TISSUE EXAM BY PATHOLOGIST: CPT

## 2023-05-25 PROCEDURE — 2709999900 HC NON-CHARGEABLE SUPPLY: Performed by: OBSTETRICS & GYNECOLOGY

## 2023-05-25 PROCEDURE — 3700000000 HC ANESTHESIA ATTENDED CARE: Performed by: OBSTETRICS & GYNECOLOGY

## 2023-05-25 PROCEDURE — 6360000002 HC RX W HCPCS: Performed by: NURSE ANESTHETIST, CERTIFIED REGISTERED

## 2023-05-25 PROCEDURE — 3600000009 HC SURGERY ROBOT BASE: Performed by: OBSTETRICS & GYNECOLOGY

## 2023-05-25 PROCEDURE — 2500000003 HC RX 250 WO HCPCS: Performed by: NURSE ANESTHETIST, CERTIFIED REGISTERED

## 2023-05-25 PROCEDURE — 6370000000 HC RX 637 (ALT 250 FOR IP): Performed by: OBSTETRICS & GYNECOLOGY

## 2023-05-25 PROCEDURE — 7100000011 HC PHASE II RECOVERY - ADDTL 15 MIN: Performed by: OBSTETRICS & GYNECOLOGY

## 2023-05-25 PROCEDURE — 2720000010 HC SURG SUPPLY STERILE: Performed by: OBSTETRICS & GYNECOLOGY

## 2023-05-25 PROCEDURE — 2580000003 HC RX 258: Performed by: ANESTHESIOLOGY

## 2023-05-25 PROCEDURE — 7100000001 HC PACU RECOVERY - ADDTL 15 MIN: Performed by: OBSTETRICS & GYNECOLOGY

## 2023-05-25 PROCEDURE — 3700000001 HC ADD 15 MINUTES (ANESTHESIA): Performed by: OBSTETRICS & GYNECOLOGY

## 2023-05-25 PROCEDURE — 7100000000 HC PACU RECOVERY - FIRST 15 MIN: Performed by: OBSTETRICS & GYNECOLOGY

## 2023-05-25 PROCEDURE — C1765 ADHESION BARRIER: HCPCS | Performed by: OBSTETRICS & GYNECOLOGY

## 2023-05-25 PROCEDURE — 6360000002 HC RX W HCPCS: Performed by: OBSTETRICS & GYNECOLOGY

## 2023-05-25 PROCEDURE — 2580000003 HC RX 258: Performed by: NURSE ANESTHETIST, CERTIFIED REGISTERED

## 2023-05-25 PROCEDURE — 2500000003 HC RX 250 WO HCPCS: Performed by: OBSTETRICS & GYNECOLOGY

## 2023-05-25 PROCEDURE — 7100000010 HC PHASE II RECOVERY - FIRST 15 MIN: Performed by: OBSTETRICS & GYNECOLOGY

## 2023-05-25 PROCEDURE — 3600000019 HC SURGERY ROBOT ADDTL 15MIN: Performed by: OBSTETRICS & GYNECOLOGY

## 2023-05-25 PROCEDURE — S2900 ROBOTIC SURGICAL SYSTEM: HCPCS | Performed by: OBSTETRICS & GYNECOLOGY

## 2023-05-25 RX ORDER — DEXAMETHASONE SODIUM PHOSPHATE 4 MG/ML
INJECTION, SOLUTION INTRA-ARTICULAR; INTRALESIONAL; INTRAMUSCULAR; INTRAVENOUS; SOFT TISSUE PRN
Status: DISCONTINUED | OUTPATIENT
Start: 2023-05-25 | End: 2023-05-25 | Stop reason: SDUPTHER

## 2023-05-25 RX ORDER — ROCURONIUM BROMIDE 10 MG/ML
INJECTION, SOLUTION INTRAVENOUS PRN
Status: DISCONTINUED | OUTPATIENT
Start: 2023-05-25 | End: 2023-05-25 | Stop reason: SDUPTHER

## 2023-05-25 RX ORDER — SODIUM CHLORIDE, SODIUM LACTATE, POTASSIUM CHLORIDE, CALCIUM CHLORIDE 600; 310; 30; 20 MG/100ML; MG/100ML; MG/100ML; MG/100ML
INJECTION, SOLUTION INTRAVENOUS CONTINUOUS PRN
Status: DISCONTINUED | OUTPATIENT
Start: 2023-05-25 | End: 2023-05-25 | Stop reason: SDUPTHER

## 2023-05-25 RX ORDER — NEOSTIGMINE METHYLSULFATE 1 MG/ML
INJECTION, SOLUTION INTRAVENOUS PRN
Status: DISCONTINUED | OUTPATIENT
Start: 2023-05-25 | End: 2023-05-25 | Stop reason: SDUPTHER

## 2023-05-25 RX ORDER — SODIUM CHLORIDE, SODIUM LACTATE, POTASSIUM CHLORIDE, CALCIUM CHLORIDE 600; 310; 30; 20 MG/100ML; MG/100ML; MG/100ML; MG/100ML
INJECTION, SOLUTION INTRAVENOUS CONTINUOUS
Status: DISCONTINUED | OUTPATIENT
Start: 2023-05-25 | End: 2023-05-25 | Stop reason: HOSPADM

## 2023-05-25 RX ORDER — GLYCOPYRROLATE 0.2 MG/ML
INJECTION INTRAMUSCULAR; INTRAVENOUS PRN
Status: DISCONTINUED | OUTPATIENT
Start: 2023-05-25 | End: 2023-05-25 | Stop reason: SDUPTHER

## 2023-05-25 RX ORDER — BUPIVACAINE HYDROCHLORIDE AND EPINEPHRINE 5; 5 MG/ML; UG/ML
INJECTION, SOLUTION PERINEURAL PRN
Status: DISCONTINUED | OUTPATIENT
Start: 2023-05-25 | End: 2023-05-25 | Stop reason: ALTCHOICE

## 2023-05-25 RX ORDER — MIDAZOLAM HYDROCHLORIDE 1 MG/ML
INJECTION INTRAMUSCULAR; INTRAVENOUS PRN
Status: DISCONTINUED | OUTPATIENT
Start: 2023-05-25 | End: 2023-05-25 | Stop reason: SDUPTHER

## 2023-05-25 RX ORDER — LIDOCAINE HYDROCHLORIDE 10 MG/ML
1 INJECTION, SOLUTION EPIDURAL; INFILTRATION; INTRACAUDAL; PERINEURAL
Status: DISCONTINUED | OUTPATIENT
Start: 2023-05-25 | End: 2023-05-25 | Stop reason: HOSPADM

## 2023-05-25 RX ORDER — IBUPROFEN 800 MG/1
800 TABLET ORAL EVERY 6 HOURS PRN
Qty: 60 TABLET | Refills: 1 | Status: SHIPPED | OUTPATIENT
Start: 2023-05-25 | End: 2023-06-24

## 2023-05-25 RX ORDER — PROPOFOL 10 MG/ML
INJECTION, EMULSION INTRAVENOUS PRN
Status: DISCONTINUED | OUTPATIENT
Start: 2023-05-25 | End: 2023-05-25 | Stop reason: SDUPTHER

## 2023-05-25 RX ORDER — LIDOCAINE HYDROCHLORIDE 20 MG/ML
INJECTION, SOLUTION EPIDURAL; INFILTRATION; INTRACAUDAL; PERINEURAL PRN
Status: DISCONTINUED | OUTPATIENT
Start: 2023-05-25 | End: 2023-05-25 | Stop reason: SDUPTHER

## 2023-05-25 RX ORDER — ONDANSETRON 2 MG/ML
INJECTION INTRAMUSCULAR; INTRAVENOUS PRN
Status: DISCONTINUED | OUTPATIENT
Start: 2023-05-25 | End: 2023-05-25 | Stop reason: SDUPTHER

## 2023-05-25 RX ORDER — IBUPROFEN 800 MG/1
TABLET ORAL
Status: ON HOLD | COMMUNITY
Start: 2023-05-04 | End: 2023-05-25 | Stop reason: SDUPTHER

## 2023-05-25 RX ORDER — FLUOXETINE HYDROCHLORIDE 40 MG/1
CAPSULE ORAL DAILY
COMMUNITY
Start: 2023-05-03

## 2023-05-25 RX ORDER — FAMOTIDINE 10 MG/ML
INJECTION, SOLUTION INTRAVENOUS PRN
Status: DISCONTINUED | OUTPATIENT
Start: 2023-05-25 | End: 2023-05-25 | Stop reason: SDUPTHER

## 2023-05-25 RX ORDER — OXYCODONE HYDROCHLORIDE AND ACETAMINOPHEN 5; 325 MG/1; MG/1
1 TABLET ORAL ONCE
Status: COMPLETED | OUTPATIENT
Start: 2023-05-25 | End: 2023-05-25

## 2023-05-25 RX ORDER — HYDROMORPHONE HCL 110MG/55ML
PATIENT CONTROLLED ANALGESIA SYRINGE INTRAVENOUS PRN
Status: DISCONTINUED | OUTPATIENT
Start: 2023-05-25 | End: 2023-05-25 | Stop reason: SDUPTHER

## 2023-05-25 RX ORDER — OXYCODONE HYDROCHLORIDE AND ACETAMINOPHEN 5; 325 MG/1; MG/1
1 TABLET ORAL EVERY 6 HOURS PRN
Qty: 20 TABLET | Refills: 0 | Status: SHIPPED | OUTPATIENT
Start: 2023-05-25 | End: 2023-06-01

## 2023-05-25 RX ORDER — MAGNESIUM SULFATE IN WATER 40 MG/ML
INJECTION, SOLUTION INTRAVENOUS PRN
Status: DISCONTINUED | OUTPATIENT
Start: 2023-05-25 | End: 2023-05-25 | Stop reason: SDUPTHER

## 2023-05-25 RX ORDER — POTASSIUM CHLORIDE 1500 MG/1
TABLET, EXTENDED RELEASE ORAL
Qty: 30 TABLET | Refills: 10 | Status: SHIPPED | OUTPATIENT
Start: 2023-05-25

## 2023-05-25 RX ORDER — KETOROLAC TROMETHAMINE 30 MG/ML
INJECTION, SOLUTION INTRAMUSCULAR; INTRAVENOUS PRN
Status: DISCONTINUED | OUTPATIENT
Start: 2023-05-25 | End: 2023-05-25 | Stop reason: SDUPTHER

## 2023-05-25 RX ADMIN — PROPOFOL 180 MG: 10 INJECTION, EMULSION INTRAVENOUS at 16:11

## 2023-05-25 RX ADMIN — GLYCOPYRROLATE 0.4 MG: 0.2 INJECTION INTRAMUSCULAR; INTRAVENOUS at 17:49

## 2023-05-25 RX ADMIN — SODIUM CHLORIDE, POTASSIUM CHLORIDE, SODIUM LACTATE AND CALCIUM CHLORIDE: 600; 310; 30; 20 INJECTION, SOLUTION INTRAVENOUS at 15:58

## 2023-05-25 RX ADMIN — HYDROMORPHONE HYDROCHLORIDE 0.5 MG: 2 INJECTION, SOLUTION INTRAMUSCULAR; INTRAVENOUS; SUBCUTANEOUS at 17:50

## 2023-05-25 RX ADMIN — KETOROLAC TROMETHAMINE 30 MG: 30 INJECTION, SOLUTION INTRAMUSCULAR; INTRAVENOUS at 17:48

## 2023-05-25 RX ADMIN — ONDANSETRON HYDROCHLORIDE 4 MG: 2 SOLUTION INTRAMUSCULAR; INTRAVENOUS at 16:51

## 2023-05-25 RX ADMIN — LIDOCAINE HYDROCHLORIDE 2 MG/KG/HR: 20 INJECTION, SOLUTION EPIDURAL; INFILTRATION; INTRACAUDAL; PERINEURAL at 16:18

## 2023-05-25 RX ADMIN — HYDROMORPHONE HYDROCHLORIDE 0.5 MG: 2 INJECTION, SOLUTION INTRAMUSCULAR; INTRAVENOUS; SUBCUTANEOUS at 16:03

## 2023-05-25 RX ADMIN — MIDAZOLAM HYDROCHLORIDE 2 MG: 1 INJECTION, SOLUTION INTRAMUSCULAR; INTRAVENOUS at 16:03

## 2023-05-25 RX ADMIN — Medication 3000 MG: at 16:19

## 2023-05-25 RX ADMIN — FAMOTIDINE 20 MG: 10 INJECTION INTRAVENOUS at 16:52

## 2023-05-25 RX ADMIN — HYDROMORPHONE HYDROCHLORIDE 1 MG: 2 INJECTION, SOLUTION INTRAMUSCULAR; INTRAVENOUS; SUBCUTANEOUS at 18:17

## 2023-05-25 RX ADMIN — SODIUM CHLORIDE, POTASSIUM CHLORIDE, SODIUM LACTATE AND CALCIUM CHLORIDE: 600; 310; 30; 20 INJECTION, SOLUTION INTRAVENOUS at 13:06

## 2023-05-25 RX ADMIN — OXYCODONE AND ACETAMINOPHEN 1 TABLET: 5; 325 TABLET ORAL at 19:23

## 2023-05-25 RX ADMIN — MAGNESIUM SULFATE HEPTAHYDRATE 2000 MG: 40 INJECTION, SOLUTION INTRAVENOUS at 16:35

## 2023-05-25 RX ADMIN — NEOSTIGMINE METHYLSULFATE 3 MG: 1 INJECTION, SOLUTION INTRAVENOUS at 17:49

## 2023-05-25 RX ADMIN — ROCURONIUM BROMIDE 50 MG: 10 INJECTION INTRAVENOUS at 16:11

## 2023-05-25 RX ADMIN — NEOSTIGMINE METHYLSULFATE 2 MG: 1 INJECTION, SOLUTION INTRAVENOUS at 17:56

## 2023-05-25 RX ADMIN — LIDOCAINE HYDROCHLORIDE 120 MG: 20 INJECTION, SOLUTION EPIDURAL; INFILTRATION; INTRACAUDAL; PERINEURAL at 16:03

## 2023-05-25 RX ADMIN — ROCURONIUM BROMIDE 50 MG: 10 INJECTION INTRAVENOUS at 16:51

## 2023-05-25 RX ADMIN — GLYCOPYRROLATE 0.4 MG: 0.2 INJECTION INTRAMUSCULAR; INTRAVENOUS at 17:56

## 2023-05-25 RX ADMIN — DEXAMETHASONE SODIUM PHOSPHATE 4 MG: 4 INJECTION, SOLUTION INTRAMUSCULAR; INTRAVENOUS at 16:51

## 2023-05-25 ASSESSMENT — LIFESTYLE VARIABLES: SMOKING_STATUS: 1

## 2023-05-25 ASSESSMENT — PAIN - FUNCTIONAL ASSESSMENT: PAIN_FUNCTIONAL_ASSESSMENT: 0-10

## 2023-05-25 ASSESSMENT — PAIN DESCRIPTION - PAIN TYPE
TYPE: SURGICAL PAIN

## 2023-05-25 ASSESSMENT — PAIN DESCRIPTION - DESCRIPTORS
DESCRIPTORS: CRAMPING

## 2023-05-25 ASSESSMENT — PAIN SCALES - GENERAL
PAINLEVEL_OUTOF10: 10
PAINLEVEL_OUTOF10: 6
PAINLEVEL_OUTOF10: 6

## 2023-05-25 ASSESSMENT — PAIN DESCRIPTION - ORIENTATION
ORIENTATION: LOWER

## 2023-05-25 ASSESSMENT — PAIN DESCRIPTION - LOCATION
LOCATION: ABDOMEN

## 2023-05-25 NOTE — ANESTHESIA POSTPROCEDURE EVALUATION
Department of Anesthesiology  Postprocedure Note    Patient: Chelita Fuetnes  MRN: [id number]  YOB: 1985  Date of evaluation: 5/25/2023      Procedure Summary     Date: 05/25/23 Room / Location: [facility] MAIN OR  / [facility] MAIN OR    Anesthesia Start:  Anesthesia Stop:     Procedure: ROBOTIC TOTAL LAPAROSCOPIC HYSTERECTOMY, BILATERAL SALPINGECTOMY Diagnosis:       Post endometrial ablation syndrome      Pelvic pain in female      (Post endometrial ablation syndrome [N99.85])      (Pelvic pain in female [R10.2])    Surgeons: Jie Jon MD Responsible Provider:     Anesthesia Type: general ASA Status: 3          Anesthesia Type: No value filed.     Felisa Phase I: Felisa Score: 10    Felisa Phase II:        Anesthesia Post Evaluation    Patient location during evaluation: PACU  Patient participation: complete - patient participated  Level of consciousness: awake  Airway patency: patent  Nausea & Vomiting: no vomiting and no nausea  Complications: no  Cardiovascular status: hemodynamically stable  Respiratory status: acceptable  Hydration status: stable

## 2023-05-25 NOTE — DISCHARGE SUMMARY
Gynecology Surgical Discharge Summary     Name: Katie Mccarty MRN: [id number]  SSN: [id number]    YOB: 1985  Age: 30 y.o. Sex: female      Admit date: 5/25/2023    Discharge Date: 5/25/2023      Attending Physician: Ashok Ortiz MD     Admission Diagnoses: Post endometrial ablation syndrome [N99.85]  Pelvic pain in female [R10.2]    Discharge Diagnoses: Post endometrial ablation syndrome [N99.85]  Pelvic pain in female [R10.2]   Principal Problem:    Post endometrial ablation syndrome  Active Problems: Morbid obesity with BMI of 40.0-44.9, adult (HCC)    Fibroid    Pelvic pain in female  Resolved Problems:    * No resolved hospital problems. *       Procedures: Procedure(s):  ROBOTIC TOTAL LAPAROSCOPIC HYSTERECTOMY, BILATERAL SALPINGECTOMY    Hospital Course: Normal hospital course for this procedure. The patient was released to her home in good condition. Significant Diagnostic Studies:   Recent Results (from the past 24 hour(s))   POC Pregnancy Urine Qual    Collection Time: 05/25/23 12:37 PM   Result Value Ref Range    Preg Test, Ur Negative NEG         Patient Instructions:     Diet, activity, wound care: See printed instructions. I spent 10 minutes discharging the patient in face to face contact.     Follow-up Information       Follow up With Specialties Details Why Contact Info    Ashok Ortiz MD Obstetrics & Gynecology, Gynecology, Obstetrics Follow up in 2 week(s) Post [address]  [phone]               Signed By:  Jinny James MD     May 25, 2023

## 2023-05-25 NOTE — ANESTHESIA PRE PROCEDURE
Department of Anesthesiology  Preprocedure Note       Name:  Benjamín Kay   Age:  30 y.o.  :  1985                                          MRN:  [id number]         Date:  2023      Surgeon: Manuela Lucio):  Greta Lopez MD    Procedure: Procedure(s):  ROBOTIC TOTAL LAPAROSCOPIC HYSTERECTOMY, BILATERAL SALPINGECTOMY    Medications prior to admission:   Prior to Admission medications    Medication Sig Start Date End Date Taking? Authorizing Provider   FLUoxetine (PROZAC) 40 MG capsule Take by mouth daily 5/3/23  Yes Historical Provider, MD   ibuprofen (ADVIL;MOTRIN) 800 MG tablet TAKE 1 TABLET BY MOUTH EVERY SIX (6) HOURS AS NEEDED FOR PAIN FOR UP TO 30 DAYS. INDICATIONS: PAIN 23  Yes Historical Provider, MD   oxyCODONE (ROXICODONE) 5 MG immediate release tablet Take 1 tablet by mouth every 6 hours as needed for Pain. Historical Provider, MD   Multiple Vitamins-Minerals (THERAPEUTIC MULTIVITAMIN-MINERALS) tablet Take 2 tablets by mouth daily    Historical Provider, MD   FLUOXETINE HCL PO Take 1 tablet by mouth daily New Rx, not started as of 23 PAT appointment    Historical Provider, MD   albuterol sulfate (PROAIR RESPICLICK) 954 (90 Base) MCG/ACT aerosol powder inhalation Inhale 4-6 puffs into the lungs every 4 hours as needed 22   Ar Automatic Reconciliation   chlorthalidone (HYGROTON) 25 MG tablet Take 1 tablet by mouth daily    Ar Automatic Reconciliation   ergocalciferol (ERGOCALCIFEROL) 1.25 MG (16396 UT) capsule TAKE 1 CAPSULE BY MOUTH EVERY SEVEN (7) DAYS.  22   Ar Automatic Reconciliation   furosemide (LASIX) 40 MG tablet Take 1 tablet by mouth daily 22   Ar Automatic Reconciliation   levothyroxine (SYNTHROID) 150 MCG tablet Take 1 tablet by mouth daily 22   Ar Automatic Reconciliation   potassium chloride (KLOR-CON M) 20 MEQ extended release tablet Take 1 tablet by mouth 2 times daily 22   Ar Automatic Reconciliation       Current medications:    Current

## 2023-05-25 NOTE — DISCHARGE INSTRUCTIONS
transportation. If you are sick:  Leave your home only if you need to get medical care. But call the doctor's office first so they know you're coming. And wear a face mask, if you have one. If you have a face mask, wear it whenever you're around other people. It can help stop the spread of the virus when you cough or sneeze. Clean and disinfect your home every day. Use household  and disinfectant wipes or sprays. Take special care to clean things that you grab with your hands. These include doorknobs, remote controls, phones, and handles on your refrigerator and microwave. And don't forget countertops, tabletops, bathrooms, and computer keyboards. When to call for help  Call 911 anytime you think you may need emergency care. For example, call if:  You have severe trouble breathing. (You can't talk at all.)  You have constant chest pain or pressure. You are severely dizzy or lightheaded. You are confused or can't think clearly. Your face and lips have a blue color. You pass out (lose consciousness) or are very hard to wake up. Call your doctor now if you develop symptoms such as:  Shortness of breath. Fever. Cough. If you need to get care, call ahead to the doctor's office for instructions before you go. Make sure you wear a face mask, if you have one, to prevent exposing other people to the virus. Where can you get the latest information? The following health organizations are tracking and studying this virus. Their websites contain the most up-to-date information. Kayli Bishop also learn what to do if you think you may have been exposed to the virus. U.S. Centers for Disease Control and Prevention (CDC): The CDC provides updated news about the disease and travel advice. The website also tells you how to prevent the spread of infection. www.cdc.gov  World Health Organization [facility]): WHO offers information about the virus outbreaks.  WHO also has travel advice. www.who.int  Current as of: April 1, 2020

## 2023-05-25 NOTE — ANESTHESIA POSTPROCEDURE EVALUATION
Department of Anesthesiology  Postprocedure Note    Patient: Fallon Peoples  MRN: [id number]  YOB: 1985  Date of evaluation: 5/25/2023      Procedure Summary     Date: 05/25/23 Room / Location: [facility] MAIN OR  / [facility] MAIN OR    Anesthesia Start: 8838 Anesthesia Stop: 1818    Procedure: ROBOTIC TOTAL LAPAROSCOPIC HYSTERECTOMY, BILATERAL SALPINGECTOMY (Abdomen/Perineum) Diagnosis:       Post endometrial ablation syndrome      Pelvic pain in female      (Post endometrial ablation syndrome [N99.85])      (Pelvic pain in female [R10.2])    Surgeons: Ayana Joyce MD Responsible Provider: Mickey Anne MD    Anesthesia Type: general ASA Status: 3          Anesthesia Type: No value filed.     Felisa Phase I: Felisa Score: 7    Felisa Phase II:        Anesthesia Post Evaluation    Patient location during evaluation: PACU  Patient participation: complete - patient participated  Level of consciousness: awake  Airway patency: patent  Nausea & Vomiting: no vomiting and no nausea  Complications: no  Cardiovascular status: hemodynamically stable  Respiratory status: acceptable  Hydration status: stable

## 2023-05-26 RX ORDER — IBUPROFEN 800 MG/1
TABLET ORAL
Qty: 30 TABLET | OUTPATIENT
Start: 2023-05-26

## 2023-05-26 RX ORDER — CHLORTHALIDONE 25 MG/1
TABLET ORAL
Qty: 60 TABLET | Refills: 3 | Status: SHIPPED | OUTPATIENT
Start: 2023-05-26

## 2023-06-01 ENCOUNTER — TELEPHONE (OUTPATIENT)
Age: 38
End: 2023-06-01

## 2023-06-02 NOTE — TELEPHONE ENCOUNTER
Returned call from answering service @ 2046. Got voice mail. LM: can go to ER if sx severe and needs to be evaluated overnight. Can call office in AM.  Can call answering service back to page me. Per msg, had hysterectomy 5/25. Pain is getting worse.

## 2023-06-08 NOTE — PROGRESS NOTES
Africa Schofield is a 30 y.o. female presents for a problem visit. Chief Complaint   Patient presents with    Post-Op Check     Patient's last menstrual period was 01/17/2023 (approximate). Birth Control: status post hysterectomy. Last Pap: normal obtained 3/15/2022. The patient is reporting having: Post-Op for ROBOTIC TOTAL LAPAROSCOPIC HYSTERECTOMY, BILATERAL SALPINGECTOMY on 5/25/2023.        1. Have you been to the ER, urgent care clinic, or hospitalized since your last visit? No    2. Have you seen or consulted any other health care providers outside of the [address] [place] [address] since your last visit? No    Examination chaperoned by Emry Apley, CMA.

## 2023-06-09 ENCOUNTER — OFFICE VISIT (OUTPATIENT)
Age: 38
End: 2023-06-09

## 2023-06-09 VITALS
WEIGHT: 268.6 LBS | BODY MASS INDEX: 43.17 KG/M2 | DIASTOLIC BLOOD PRESSURE: 109 MMHG | SYSTOLIC BLOOD PRESSURE: 159 MMHG | HEIGHT: 66 IN

## 2023-06-09 DIAGNOSIS — Z90.710 STATUS POST LAPAROSCOPIC HYSTERECTOMY: ICD-10-CM

## 2023-06-09 DIAGNOSIS — Z09 POSTOP CHECK: Primary | ICD-10-CM

## 2023-06-09 DIAGNOSIS — G89.18 POSTOPERATIVE PAIN: ICD-10-CM

## 2023-06-09 PROBLEM — N99.85 POST ENDOMETRIAL ABLATION SYNDROME: Status: RESOLVED | Noted: 2023-05-25 | Resolved: 2023-06-09

## 2023-06-09 PROBLEM — N92.4 MENORRHAGIA, PREMENOPAUSAL: Status: RESOLVED | Noted: 2019-05-09 | Resolved: 2023-06-09

## 2023-06-09 PROBLEM — D21.9 FIBROID: Status: RESOLVED | Noted: 2022-10-09 | Resolved: 2023-06-09

## 2023-06-09 PROBLEM — N93.9 ABNORMAL UTERINE BLEEDING (AUB): Status: RESOLVED | Noted: 2022-06-27 | Resolved: 2023-06-09

## 2023-06-09 PROBLEM — R10.2 PELVIC PAIN IN FEMALE: Status: RESOLVED | Noted: 2023-05-25 | Resolved: 2023-06-09

## 2023-06-09 RX ORDER — OXYCODONE HYDROCHLORIDE AND ACETAMINOPHEN 5; 325 MG/1; MG/1
1 TABLET ORAL EVERY 6 HOURS PRN
Qty: 20 TABLET | Refills: 0 | Status: SHIPPED | OUTPATIENT
Start: 2023-06-09 | End: 2023-06-16

## 2023-06-09 RX ORDER — DOXYCYCLINE HYCLATE 100 MG
100 TABLET ORAL 2 TIMES DAILY
Qty: 20 TABLET | Refills: 0 | Status: SHIPPED | OUTPATIENT
Start: 2023-06-09 | End: 2023-06-19

## 2023-06-09 NOTE — PROGRESS NOTES
Deepak Meng is a 30 y.o. female returns for a routine post-operative follow-up visit following Robotic Total Laparoscopic Hysterectomy, Bilateral Salpingectomy. This was done on 5/25/2023  (about 15  days ago). Has had some problems with discharge and pelvic pain. No fevers. Discharge yellow. No vaginal bleeding. Has been doing a lot with 3 graduations recently. Some dyschezia. Past Surgical History:   Procedure Laterality Date    ENDOMETRIAL ABLATION  12/16/2022    Novasure    HYSTERECTOMY (CERVIX STATUS UNKNOWN) N/A 05/25/2023    ROBOTIC TOTAL LAPAROSCOPIC HYSTERECTOMY, BILATERAL SALPINGECTOMY performed by Tyrell Aviles MD at [address]  05/25/2023    Miya Medal    SALPINGECTOMY Bilateral 05/25/2023    San[facility]ich[facility]    THYROIDECTOMY  07/2021    Large goiter couldn't swallow.             PHYSICAL EXAMINATION    Gastrointestinal  Abdominal Examination: abdomen non-tender to palpation, incisions healing well, normal bowel sounds, no masses present  Liver and spleen: no hepatomegaly present, spleen not palpable  Hernias: no hernias identified    Genitourinary  External Genitalia: normal appearance for age, no discharge present, no tenderness present, no inflammatory lesions present, no masses present, no atrophy present  Vagina: normal vaginal vault without central or paravaginal defects, yellow discharge present, no inflammatory lesions present, no masses present, vaginal cuff intact  Bladder: non-tender to palpation  Urethra: appears normal  Cervix: absent   Uterus: absent  Adnexa: no adnexal tenderness present, no adnexal masses present  Perineum: perineum within normal limits, no evidence of trauma, no rashes or skin lesions present  Skin  General Inspection: no rash, no lesions identified    Neurologic/Psychiatric  Mental Status:  Orientation: grossly oriented to person, place and time  Mood and Affect: mood normal, affect

## 2023-06-12 RX ORDER — ERGOCALCIFEROL 1.25 MG/1
CAPSULE ORAL
Qty: 4 CAPSULE | Refills: 1 | OUTPATIENT
Start: 2023-06-12

## 2023-06-20 RX ORDER — IBUPROFEN 800 MG/1
TABLET ORAL
Qty: 30 TABLET | OUTPATIENT
Start: 2023-06-20

## 2023-06-21 ENCOUNTER — TELEPHONE (OUTPATIENT)
Age: 38
End: 2023-06-21

## 2023-06-21 NOTE — TELEPHONE ENCOUNTER
GM pt- last seen in office on 6/9/23 for post op check for recent ROBOTIC TOTAL LAPAROSCOPIC HYSTERECTOMY, BILATERAL SALPINGECTOMY that she had performed 2 weeks prior to that. The pt states when she came in for her post-op appt she was not bleeding. However, about 1 week later she started spotting on and off, still spotting today. She also has been having some pain a the bottom of abdomen. Pt would like to know if this is normal? Should she be bleeding? Any recc? Please advise. Day Stoddard

## 2023-06-28 ENCOUNTER — TELEPHONE (OUTPATIENT)
Age: 38
End: 2023-06-28

## 2023-07-16 RX ORDER — ERGOCALCIFEROL 1.25 MG/1
CAPSULE ORAL
Qty: 4 CAPSULE | Refills: 1 | OUTPATIENT
Start: 2023-07-16

## 2023-08-14 ENCOUNTER — OFFICE VISIT (OUTPATIENT)
Age: 38
End: 2023-08-14
Payer: COMMERCIAL

## 2023-08-14 VITALS
HEIGHT: 66 IN | SYSTOLIC BLOOD PRESSURE: 140 MMHG | DIASTOLIC BLOOD PRESSURE: 95 MMHG | BODY MASS INDEX: 41.91 KG/M2 | WEIGHT: 260.8 LBS

## 2023-08-14 DIAGNOSIS — N76.0 ACUTE VAGINITIS: Primary | ICD-10-CM

## 2023-08-14 PROBLEM — C73 PAPILLARY THYROID CARCINOMA (HCC): Status: ACTIVE | Noted: 2021-07-01

## 2023-08-14 PROBLEM — E04.1 THYROID NODULE: Status: RESOLVED | Noted: 2019-05-17 | Resolved: 2023-08-14

## 2023-08-14 PROBLEM — E04.2 MULTINODULAR GOITER: Status: RESOLVED | Noted: 2021-07-30 | Resolved: 2023-08-14

## 2023-08-14 PROCEDURE — 3080F DIAST BP >= 90 MM HG: CPT | Performed by: OBSTETRICS & GYNECOLOGY

## 2023-08-14 PROCEDURE — 99213 OFFICE O/P EST LOW 20 MIN: CPT | Performed by: OBSTETRICS & GYNECOLOGY

## 2023-08-14 PROCEDURE — 3077F SYST BP >= 140 MM HG: CPT | Performed by: OBSTETRICS & GYNECOLOGY

## 2023-08-14 NOTE — PROGRESS NOTES
Kezia Kay is a 30 y.o. female presents for a problem visit. Chief Complaint   Patient presents with    Post-Op Check     Patient's last menstrual period was 01/17/2023 (approximate). Birth Control: status post hysterectomy. Last Pap: normal obtained 3/15/2022. The patient is reporting having: Post-Op for . ROBOTIC TOTAL LAPAROSCOPIC HYSTERECTOMY, BILATERAL SALPINGECTOMY on 5/25/2023.        1. Have you been to the ER, urgent care clinic, or hospitalized since your last visit? No    2. Have you seen or consulted any other health care providers outside of the [address] [place] [address] Avenue since your last visit? No    Examination chaperoned by Shahid Vaca CMA.
Term 08/10/05 40w0d  6 lb (2.722 kg) F Vag-Spont EPI  KAYLYNN   1 SAB              Social History     Socioeconomic History    Marital status: Single     Spouse name: None    Number of children: 7    Years of education: None    Highest education level: None   Tobacco Use    Smoking status: Every Day     Packs/day: 0.25     Years: 16.00     Pack years: 4.00     Types: Cigarettes     Last attempt to quit: 2023     Years since quittin.6    Smokeless tobacco: Never   Vaping Use    Vaping Use: Some days   Substance and Sexual Activity    Alcohol use: Yes     Alcohol/week: 2.0 standard drinks     Types: 2 Shots of liquor per week    Drug use: Yes     Frequency: 2.0 times per week     Types: Marijuana Darlene Canal)    Sexual activity: Yes     Partners: Male     Birth control/protection: Surgical   Social History Narrative    Habits:  Smokes now down to a cigarette a day, she is trying to stop. She is a social drinker. She occasionally uses marijuana. Social History:  The patient is single, her mom lives with her. She has seven children ages 2-15, two sons and five yordan    ghters. She completed high school and is gainfully employed for [employer]. She has no Christianity background. Family History:  Father is unknown. Mother is 54 with RA. One brother, one sister, alive and well.       Family History   Problem Relation Age of Onset    Hypertension Maternal Grandmother     Diabetes Maternal Grandmother     Alzheimer's Disease Mother 62    HIV/AIDS Mother     Rheum Arthritis Mother     Allergy (Severe) Mother     Anesth Problems Neg Hx      Current Outpatient Medications on File Prior to Visit   Medication Sig Dispense Refill    chlorthalidone (HYGROTON) 25 MG tablet TAKE 1 TABLET BY MOUTH EVERY DAY 60 tablet 3    KLOR-CON M20 20 MEQ extended release tablet TAKE 1 TABLET BY MOUTH EVERY DAY 30 tablet 10    furosemide (LASIX) 40 MG tablet Take 1 tablet by mouth daily      levothyroxine (SYNTHROID) 150 MCG tablet

## 2023-08-21 ENCOUNTER — IMMUNIZATION (OUTPATIENT)
Age: 38
End: 2023-08-21

## 2023-08-21 DIAGNOSIS — Z11.1 TUBERCULOSIS SCREENING: Primary | ICD-10-CM

## 2023-08-21 NOTE — PROGRESS NOTES
PPD Placement note  Rehana Ambrocio, 41 y.o. female is here today for placement of PPD test  Reason for PPD test: Employment  Pt taken PPD test before: yes  Verified in allergy area and with patient that they are not allergic to the products PPD is made of (Phenol or Tween). Yes  Is patient taking any oral or IV steroid medication now or have they taken it in the last month? no  Has the patient ever received the BCG vaccine?: no  Has the patient been in recent contact with anyone known or suspected of having active TB disease?: no       Date of exposure (if applicable): n/a       Name of person they were exposed to (if applicable): n/a  Patient's Country of origin?: n/a  O: Alert and oriented in NAD. P:  PPD placed on 8/21/2023. Patient advised to return for reading within 48-72 hours. Kaveh Adair

## 2023-08-21 NOTE — PATIENT INSTRUCTIONS
Post TB skin care instructions given, patient verbalized understanding, instructed to RTC in 48 hours. Brianda Kapoor

## 2023-08-23 ENCOUNTER — IMMUNIZATION (OUTPATIENT)
Age: 38
End: 2023-08-23

## 2023-08-23 NOTE — PROGRESS NOTES
PPD Reading Note  PPD read and results entered in [facility]. Result: 0 mm induration.   Interpretation: negative  If test not read within 48-72 hours of initial placement, patient advised to repeat in other arm 1-3 weeks after this test.  Allergic reaction: lucy Dent RN

## 2023-09-15 ENCOUNTER — HOSPITAL ENCOUNTER (EMERGENCY)
Facility: HOSPITAL | Age: 38
Discharge: HOME | End: 2023-09-15
Attending: EMERGENCY MEDICINE
Payer: COMMERCIAL

## 2023-09-15 ENCOUNTER — APPOINTMENT (OUTPATIENT)
Facility: HOSPITAL | Age: 38
End: 2023-09-15
Payer: COMMERCIAL

## 2023-09-15 VITALS
DIASTOLIC BLOOD PRESSURE: 98 MMHG | SYSTOLIC BLOOD PRESSURE: 176 MMHG | TEMPERATURE: 98.2 F | BODY MASS INDEX: 41.78 KG/M2 | WEIGHT: 260 LBS | RESPIRATION RATE: 18 BRPM | OXYGEN SATURATION: 99 % | HEIGHT: 66 IN | HEART RATE: 72 BPM

## 2023-09-15 DIAGNOSIS — A59.9 TRICHOMONAS INFECTION: ICD-10-CM

## 2023-09-15 DIAGNOSIS — M25.552 ACUTE HIP PAIN, LEFT: Primary | ICD-10-CM

## 2023-09-15 DIAGNOSIS — N76.0 BV (BACTERIAL VAGINOSIS): ICD-10-CM

## 2023-09-15 DIAGNOSIS — B96.89 BV (BACTERIAL VAGINOSIS): ICD-10-CM

## 2023-09-15 LAB
APPEARANCE UR: CLEAR
BACTERIA URNS QL MICRO: NEGATIVE /HPF
BILIRUB UR QL: NEGATIVE
CLUE CELLS VAG QL WET PREP: NORMAL
COLOR UR: ABNORMAL
EPITH CASTS URNS QL MICRO: ABNORMAL /LPF
GLUCOSE UR STRIP.AUTO-MCNC: NEGATIVE MG/DL
HGB UR QL STRIP: NEGATIVE
KETONES UR QL STRIP.AUTO: NEGATIVE MG/DL
KOH PREP SPEC: NORMAL
LEUKOCYTE ESTERASE UR QL STRIP.AUTO: ABNORMAL
NITRITE UR QL STRIP.AUTO: NEGATIVE
PH UR STRIP: 7 (ref 5–8)
PROT UR STRIP-MCNC: NEGATIVE MG/DL
RBC #/AREA URNS HPF: ABNORMAL /HPF (ref 0–5)
SERVICE CMNT-IMP: NORMAL
SP GR UR REFRACTOMETRY: 1.02
T VAGINALIS VAG QL WET PREP: NORMAL
TRICHOMONAS UR QL MICRO: PRESENT
URINE CULTURE IF INDICATED: ABNORMAL
UROBILINOGEN UR QL STRIP.AUTO: 1 EU/DL (ref 0.2–1)
WBC URNS QL MICRO: ABNORMAL /HPF (ref 0–4)

## 2023-09-15 PROCEDURE — 96372 THER/PROPH/DIAG INJ SC/IM: CPT

## 2023-09-15 PROCEDURE — 87491 CHLMYD TRACH DNA AMP PROBE: CPT

## 2023-09-15 PROCEDURE — 86780 TREPONEMA PALLIDUM: CPT

## 2023-09-15 PROCEDURE — 87210 SMEAR WET MOUNT SALINE/INK: CPT

## 2023-09-15 PROCEDURE — 81001 URINALYSIS AUTO W/SCOPE: CPT

## 2023-09-15 PROCEDURE — 6370000000 HC RX 637 (ALT 250 FOR IP): Performed by: EMERGENCY MEDICINE

## 2023-09-15 PROCEDURE — 2500000003 HC RX 250 WO HCPCS: Performed by: EMERGENCY MEDICINE

## 2023-09-15 PROCEDURE — 73502 X-RAY EXAM HIP UNI 2-3 VIEWS: CPT

## 2023-09-15 PROCEDURE — 87591 N.GONORRHOEAE DNA AMP PROB: CPT

## 2023-09-15 PROCEDURE — 6360000002 HC RX W HCPCS: Performed by: EMERGENCY MEDICINE

## 2023-09-15 PROCEDURE — 99284 EMERGENCY DEPT VISIT MOD MDM: CPT

## 2023-09-15 RX ORDER — AZITHROMYCIN 500 MG/1
1000 TABLET, FILM COATED ORAL
Status: COMPLETED | OUTPATIENT
Start: 2023-09-15 | End: 2023-09-15

## 2023-09-15 RX ORDER — METRONIDAZOLE 500 MG/1
500 TABLET ORAL 2 TIMES DAILY
Qty: 14 TABLET | Refills: 0 | Status: SHIPPED | OUTPATIENT
Start: 2023-09-15 | End: 2023-09-22

## 2023-09-15 RX ORDER — IBUPROFEN 600 MG/1
600 TABLET ORAL EVERY 6 HOURS PRN
Qty: 30 TABLET | Refills: 0 | Status: SHIPPED | OUTPATIENT
Start: 2023-09-15

## 2023-09-15 RX ORDER — IBUPROFEN 400 MG/1
800 TABLET ORAL
Status: COMPLETED | OUTPATIENT
Start: 2023-09-15 | End: 2023-09-15

## 2023-09-15 RX ADMIN — LIDOCAINE HYDROCHLORIDE 500 MG: 10 INJECTION, SOLUTION EPIDURAL; INFILTRATION; INTRACAUDAL; PERINEURAL at 22:07

## 2023-09-15 RX ADMIN — IBUPROFEN 800 MG: 400 TABLET, FILM COATED ORAL at 21:07

## 2023-09-15 RX ADMIN — AZITHROMYCIN DIHYDRATE 1000 MG: 500 TABLET, FILM COATED ORAL at 22:06

## 2023-09-15 ASSESSMENT — PAIN SCALES - GENERAL
PAINLEVEL_OUTOF10: 10
PAINLEVEL_OUTOF10: 10

## 2023-09-15 ASSESSMENT — PAIN DESCRIPTION - ORIENTATION
ORIENTATION: LEFT
ORIENTATION: LEFT

## 2023-09-15 ASSESSMENT — PAIN DESCRIPTION - LOCATION
LOCATION: LEG
LOCATION: LEG

## 2023-09-15 ASSESSMENT — PAIN - FUNCTIONAL ASSESSMENT: PAIN_FUNCTIONAL_ASSESSMENT: 0-10

## 2023-09-15 ASSESSMENT — PAIN DESCRIPTION - DESCRIPTORS: DESCRIPTORS: ACHING

## 2023-09-16 NOTE — ED NOTES
Discharge instructions reviewed with patient. Opportunity for additional questions provided. Patient did not have any further questions at this time. Pt is leaving dept in stable condition.        Royal Calvin RN  09/15/23 0251

## 2023-09-16 NOTE — ED NOTES
General: well developed and appearing  VS: VSS  Neuro: alert and oriented x4, follows commands, no neuro deficits  HEENT: vision and hearing intact, membranes moist, no swelling  Cardiovascular: hypertensive but without cardiac complaint   Respiratory: resp even and unlabored  Abdominal: soft, non tender  Genitourinary: vaginal discharge and urinary frequency/urgency. Concern for STD  Musculoskeletal: ambulatory but c/o left leg pain following fall at home.  No LOC or head injury  Psych: appropriate     Appearance: unremarkable       Lucienne Sicard, RN  09/15/23 2036

## 2023-09-16 NOTE — ED TRIAGE NOTES
Pt concerned for STD d/t vaginal discharge. Pt also reports falling off the top of her ladder while trying to clean out her gutters in a 1 story house. Pt only has L upper leg pain after the fall. Pt is ambulatory.

## 2023-09-17 LAB
C TRACH DNA SPEC QL NAA+PROBE: NEGATIVE
N GONORRHOEA DNA SPEC QL NAA+PROBE: NEGATIVE
SAMPLE TYPE: NORMAL
SERVICE CMNT-IMP: NORMAL
SPECIMEN SOURCE: NORMAL

## 2023-09-18 LAB — T PALLIDUM AB SER QL IA: NON REACTIVE

## 2023-12-09 ENCOUNTER — HOSPITAL ENCOUNTER (EMERGENCY)
Age: 38
Discharge: HOME | End: 2023-12-09
Attending: EMERGENCY MEDICINE
Payer: COMMERCIAL

## 2023-12-09 VITALS
DIASTOLIC BLOOD PRESSURE: 111 MMHG | HEART RATE: 81 BPM | OXYGEN SATURATION: 98 % | WEIGHT: 260 LBS | SYSTOLIC BLOOD PRESSURE: 200 MMHG | BODY MASS INDEX: 43.32 KG/M2 | HEIGHT: 65 IN | TEMPERATURE: 97.9 F

## 2023-12-09 DIAGNOSIS — I10 ESSENTIAL HYPERTENSION: ICD-10-CM

## 2023-12-09 DIAGNOSIS — R10.2 PELVIC PAIN: ICD-10-CM

## 2023-12-09 DIAGNOSIS — Z76.0 ENCOUNTER FOR MEDICATION REFILL: Primary | ICD-10-CM

## 2023-12-09 LAB
APPEARANCE UR: CLEAR
BILIRUB UR QL: NEGATIVE
COLOR UR: NORMAL
GLUCOSE UR STRIP.AUTO-MCNC: NEGATIVE MG/DL
HGB UR QL STRIP: NEGATIVE
KETONES UR QL STRIP.AUTO: NEGATIVE MG/DL
LEUKOCYTE ESTERASE UR QL STRIP.AUTO: NEGATIVE
NITRITE UR QL STRIP.AUTO: NEGATIVE
PH UR STRIP: 8 (ref 5–9)
PROT UR STRIP-MCNC: NEGATIVE MG/DL
SERVICE CMNT-IMP: NORMAL
SP GR UR REFRACTOMETRY: 1.02 (ref 1–1.02)
UROBILINOGEN UR QL STRIP.AUTO: 0.2 EU/DL (ref 0.2–1)
WET PREP GENITAL: NORMAL
WET PREP GENITAL: NORMAL

## 2023-12-09 PROCEDURE — 99283 EMERGENCY DEPT VISIT LOW MDM: CPT

## 2023-12-09 PROCEDURE — 81003 URINALYSIS AUTO W/O SCOPE: CPT

## 2023-12-09 PROCEDURE — 6370000000 HC RX 637 (ALT 250 FOR IP): Performed by: EMERGENCY MEDICINE

## 2023-12-09 PROCEDURE — 87491 CHLMYD TRACH DNA AMP PROBE: CPT

## 2023-12-09 PROCEDURE — 87591 N.GONORRHOEAE DNA AMP PROB: CPT

## 2023-12-09 PROCEDURE — 87210 SMEAR WET MOUNT SALINE/INK: CPT

## 2023-12-09 RX ORDER — CHLORTHALIDONE 25 MG/1
25 TABLET ORAL ONCE
Status: COMPLETED | OUTPATIENT
Start: 2023-12-09 | End: 2023-12-09

## 2023-12-09 RX ORDER — LEVOTHYROXINE SODIUM 0.15 MG/1
150 TABLET ORAL DAILY
Qty: 30 TABLET | Refills: 2 | Status: SHIPPED | OUTPATIENT
Start: 2023-12-09

## 2023-12-09 RX ORDER — CHLORTHALIDONE 25 MG/1
25 TABLET ORAL DAILY
Qty: 30 TABLET | Refills: 3 | Status: SHIPPED | OUTPATIENT
Start: 2023-12-09

## 2023-12-09 RX ADMIN — CHLORTHALIDONE 25 MG: 25 TABLET ORAL at 23:11

## 2023-12-09 ASSESSMENT — ENCOUNTER SYMPTOMS
SHORTNESS OF BREATH: 0
NAUSEA: 0
COUGH: 0
DIARRHEA: 0
VOMITING: 0

## 2023-12-09 ASSESSMENT — PAIN - FUNCTIONAL ASSESSMENT: PAIN_FUNCTIONAL_ASSESSMENT: 0-10

## 2023-12-09 ASSESSMENT — PAIN SCALES - GENERAL: PAINLEVEL_OUTOF10: 10

## 2023-12-10 NOTE — ED TRIAGE NOTES
Pt out of meds for few weeks requesting bp and thyroid meds , and c/o abd cramping and vaginal d/c .

## 2023-12-10 NOTE — ED PROVIDER NOTES
Emergency Department Provider Note       PCP: Kannan Chang MD   Age: 41 y.o. Sex: female     DISPOSITION Decision To Discharge 12/09/2023 11:29:38 PM       ICD-10-CM    1. Encounter for medication refill  Z76.0       2. Essential hypertension  I10       3. Pelvic pain  R10.2           Medical Decision Making     With her blood pressure being as high as it is I will give her a dose of some oral medication here in the emergency department. I will plan to refill her medicines that she is out of which includes her chlorthalidone and Synthroid. Complexity of Problems Addressed:  1 chronic illness with acute exacerbation and 1 acute illness    Data Reviewed and Analyzed:  I independently ordered and reviewed each unique test.  I reviewed external records: provider visit note from PCP. Discussion of management or test interpretation. Risk of Complications and/or Morbidity of Patient Management:  Prescription drug management performed. Shared medical decision making was utilized in creating the patients health plan today. ED Course as of 12/09/23 2335   Sat Dec 09, 2023   2329 Her initial swab and urine are negative. Her pelvic pain may perhaps just be some fibroids which she says she has a history of in the past.  I do not find anything that requires any urgent treatment. I will plan to send her home with some diclofenac for her pain and prescriptions for her chlorthalidone and Synthroid. [AC]      ED Course User Index  [AC] Osvaldo Cox MD       History       42-year-old lady presents with concerns about being out of her blood pressure medicines and thyroid medicines for a little over a week. She says she feels little off not having those medicines. She also is concerned because she has had some pelvic pain and pain with urination. She denies any specific fevers or chills. She says she has had no specific vaginal discharge although she does have a mild itch.   She denies

## 2023-12-10 NOTE — DISCHARGE INSTR - COC
Continuity of Care Form    Patient Name: Brittany De La Torre   :  1985  MRN:  [id number]    Admit date:  2023  Discharge date:  ***    Code Status Order: No Order   Advance Directives:     Admitting Physician:  No admitting provider for patient encounter. PCP: Ashlyn Ugalde MD    Discharging Nurse: [facility] Care Unit/Room#: RPA3/RP3  Discharging Unit Phone Number: ***    Emergency Contact:   Extended Emergency Contact Information  Primary Emergency Contact: [facility] Phone: [phone]  Mobile Phone: [phone]  Relation: Brother/Sister   needed? No  Secondary Emergency Contact: [facility] Phone: [phone]  Mobile Phone: [phone]  Relation: Child   needed? No    Past Surgical History:  Past Surgical History:   Procedure Laterality Date    ENDOMETRIAL ABLATION  2022    Novasure    HYSTERECTOMY (CERVIX STATUS UNKNOWN) N/A 2023    ROBOTIC TOTAL LAPAROSCOPIC HYSTERECTOMY, BILATERAL SALPINGECTOMY performed by Naya Watson MD at [facility]  2023    Danya Ishan    SALPINGECTOMY Bilateral 2023    Phyliss File @hyst    THYROIDECTOMY  2021    Large goiter couldn't swallow.          Immunization History:   Immunization History   Administered Date(s) Administered    PPD Test 2023    Lizzette, Tonia Chang (age 6y-58y), Kailash Edwards (age 10y+), IM, 0.5mL 2022       Active Problems:  Patient Active Problem List   Diagnosis Code    Edema of lower extremity R60.0    ISABEL (dyspnea on exertion) R06.09    Cigarette smoker F17.210    Morbid obesity with BMI of 40.0-44.9, adult (HCC) E66.01, Z68.41    Hip pain, chronic, right M25.551, G89.29    Hypertension I10    IGT (impaired glucose tolerance) R73.02    H/O total thyroidectomy E89.0    Anxiety with depression F41.8    COVID-19 long hauler manifesting chronic loss of smell and taste R43.8, U09.9    Status post laparoscopic hysterectomy Z90.710

## 2023-12-10 NOTE — DISCHARGE INSTRUCTIONS
Please return with any fevers, vomiting, worsening symptoms, or additional concerns. To help you get a primary care doctor for follow-up after your emergency department visit, please call [phone] between [address] Monday to Friday. A care navigator will be able to assist you with setting up a doctor close to your home.

## 2023-12-14 LAB
C TRACH RRNA SPEC QL NAA+PROBE: NEGATIVE
N GONORRHOEA RRNA SPEC QL NAA+PROBE: NEGATIVE

## 2024-05-25 ENCOUNTER — HOSPITAL ENCOUNTER (EMERGENCY)
Age: 39
Discharge: HOME | End: 2024-05-25
Attending: EMERGENCY MEDICINE
Payer: COMMERCIAL

## 2024-05-25 ENCOUNTER — APPOINTMENT (OUTPATIENT)
Dept: GENERAL RADIOLOGY | Age: 39
End: 2024-05-25
Payer: COMMERCIAL

## 2024-05-25 VITALS
TEMPERATURE: 97.9 F | SYSTOLIC BLOOD PRESSURE: 159 MMHG | BODY MASS INDEX: 41.62 KG/M2 | HEIGHT: 66 IN | HEART RATE: 85 BPM | WEIGHT: 259 LBS | DIASTOLIC BLOOD PRESSURE: 108 MMHG | OXYGEN SATURATION: 98 % | RESPIRATION RATE: 18 BRPM

## 2024-05-25 DIAGNOSIS — M54.32 SCIATICA OF LEFT SIDE: Primary | ICD-10-CM

## 2024-05-25 PROCEDURE — 72100 X-RAY EXAM L-S SPINE 2/3 VWS: CPT

## 2024-05-25 PROCEDURE — 73502 X-RAY EXAM HIP UNI 2-3 VIEWS: CPT

## 2024-05-25 PROCEDURE — 99283 EMERGENCY DEPT VISIT LOW MDM: CPT

## 2024-05-25 RX ORDER — METAXALONE 800 MG/1
400-800 TABLET ORAL 3 TIMES DAILY
Qty: 20 TABLET | Refills: 0 | Status: SHIPPED | OUTPATIENT
Start: 2024-05-25

## 2024-05-25 RX ORDER — IBUPROFEN 800 MG/1
800 TABLET ORAL EVERY 8 HOURS PRN
Qty: 21 TABLET | Refills: 0 | Status: SHIPPED | OUTPATIENT
Start: 2024-05-25

## 2024-05-25 RX ORDER — CYCLOBENZAPRINE HCL 10 MG
10 TABLET ORAL 3 TIMES DAILY PRN
Qty: 21 TABLET | Refills: 0 | Status: SHIPPED | OUTPATIENT
Start: 2024-05-25 | End: 2024-05-25 | Stop reason: SINTOL

## 2024-05-25 RX ORDER — TRAMADOL HYDROCHLORIDE 50 MG/1
50-100 TABLET ORAL EVERY 8 HOURS PRN
Qty: 12 TABLET | Refills: 0 | Status: SHIPPED | OUTPATIENT
Start: 2024-05-25 | End: 2024-05-28

## 2024-05-25 ASSESSMENT — LIFESTYLE VARIABLES
HOW MANY STANDARD DRINKS CONTAINING ALCOHOL DO YOU HAVE ON A TYPICAL DAY: PATIENT DOES NOT DRINK
HOW OFTEN DO YOU HAVE A DRINK CONTAINING ALCOHOL: NEVER

## 2024-05-25 ASSESSMENT — PAIN DESCRIPTION - LOCATION: LOCATION: LEG

## 2024-05-25 ASSESSMENT — PAIN - FUNCTIONAL ASSESSMENT: PAIN_FUNCTIONAL_ASSESSMENT: 0-10

## 2024-05-25 ASSESSMENT — PAIN DESCRIPTION - ORIENTATION: ORIENTATION: LEFT

## 2024-05-25 ASSESSMENT — PAIN SCALES - GENERAL: PAINLEVEL_OUTOF10: 10

## 2024-05-25 NOTE — ED NOTES
Patient mobility status  with no difficulty. Provider aware     I have reviewed discharge instructions with the patient.  The patient verbalized understanding.    Patient left ED via Discharge Method: ambulatory to Home with  self .    Opportunity for questions and clarification provided.     Patient given 3 scripts.       Prescription for muscle relaxers changed per MD.

## 2024-05-25 NOTE — ED TRIAGE NOTES
Pt CO L leg pain after falling while playing with her kids yesterday. Pt ambulatory to triage with limp.

## 2025-03-12 RX ORDER — POTASSIUM CHLORIDE 1500 MG/1
20 TABLET, EXTENDED RELEASE ORAL DAILY
Qty: 30 TABLET | Refills: 10 | OUTPATIENT
Start: 2025-03-12

## (undated) DEVICE — FLUID MGMT SYS FLUENT KIT 6/PK

## (undated) DEVICE — MAGNETIC INSTR DRAPE 20X16: Brand: MEDLINE INDUSTRIES, INC.

## (undated) DEVICE — AIRSEAL 8 MM ACCESS PORT AND LOW PROFILE OBTURATOR WITH BLADELESS OPTICAL TIP, 120 MM LENGTH: Brand: AIRSEAL

## (undated) DEVICE — BIPOLAR FORCEPS CORD: Brand: VALLEYLAB

## (undated) DEVICE — SOLUTION IRRIG 3000ML 0.9% SOD CHL USP UROMATIC PLAS CONT

## (undated) DEVICE — APPLIER CLP M L11IN TI MULT RNG HNDL 30 CLP STR LIGACLP

## (undated) DEVICE — KIT THERMOABLATION 6MM ENDOMET DEV NOVASURE

## (undated) DEVICE — NEEDLE HYPO 25GA L1.5IN BLU POLYPR HUB S STL REG BVL STR

## (undated) DEVICE — GARMENT,MEDLINE,DVT,INT,CALF,MED, GEN2: Brand: MEDLINE

## (undated) DEVICE — PERI GYN-SFMC: Brand: MEDLINE INDUSTRIES, INC.

## (undated) DEVICE — DRAPE,REIN 53X77,STERILE: Brand: MEDLINE

## (undated) DEVICE — VESSEL SEALER EXTEND: Brand: ENDOWRIST

## (undated) DEVICE — ROBOTIC GYN-SFMC: Brand: MEDLINE INDUSTRIES, INC.

## (undated) DEVICE — SOLUTION IRRIG 1000ML STRL H2O USP PLAS POUR BTL

## (undated) DEVICE — SOLUTION IRRIG 1000ML 0.9% SOD CHL USP POUR PLAS BTL

## (undated) DEVICE — Device

## (undated) DEVICE — JELLY,LUBE,STERILE,FLIP TOP,TUBE,4-OZ: Brand: MEDLINE

## (undated) DEVICE — AGENT HEMSTAT W2XL3IN OXIDIZED REGENERATED CELOS ABSRB

## (undated) DEVICE — SUTURE VCRL SZ 3-0 L27IN ABSRB UD L26MM SH 1/2 CIR J416H

## (undated) DEVICE — TAPE,CLOTH/SILK,CURAD,3"X10YD,LF,40/CS: Brand: CURAD

## (undated) DEVICE — FS-35 DEVICE - (35MM CERVICAL CUP): Brand: MCCARUS-VOLKER FORNISEE® SYSTEM

## (undated) DEVICE — BARRIER ADH W3XL4IN UTER PELV ABSRB GYNECARE INTCEED

## (undated) DEVICE — GOWN,SIRUS,NONRNF,SETINSLV,2XL,18/CS: Brand: MEDLINE

## (undated) DEVICE — ARM DRAPE

## (undated) DEVICE — 3M™ IOBAN™ 2 ANTIMICROBIAL INCISE DRAPE 6650EZ: Brand: IOBAN™ 2

## (undated) DEVICE — PAD POSITIONING WNG STD KIT W/BODY STRP LF DISP

## (undated) DEVICE — BLADELESS OBTURATOR: Brand: WECK VISTA

## (undated) DEVICE — TOWEL SURG W17XL27IN STD BLU COT NONFENESTRATED PREWASHED

## (undated) DEVICE — POWDER HEMOSTAT GEL 3.0GR -- SURGICEL

## (undated) DEVICE — SYR 10ML LUER LOK 1/5ML GRAD --

## (undated) DEVICE — STRIP,CLOSURE,WOUND,MEDI-STRIP,1/2X4: Brand: MEDLINE

## (undated) DEVICE — SMOKE EVACUATION PENCIL: Brand: VALLEYLAB

## (undated) DEVICE — GLOVE,SURG,SENSICARE,ALOE,LF,PF,7: Brand: MEDLINE

## (undated) DEVICE — GOWN,SIRUS,FABRNF,XL,20/CS: Brand: MEDLINE

## (undated) DEVICE — THYROID - SMH AMB: Brand: MEDLINE INDUSTRIES, INC.

## (undated) DEVICE — TRI-LUMEN FILTERED TUBE SET WITH ACTIVATED CHARCOAL FILTER: Brand: AIRSEAL

## (undated) DEVICE — HOOK RETRCT L5MM E SHRP SELF RET SYS LONE STAR

## (undated) DEVICE — SUT SLK 2-0SH 30IN BLK --

## (undated) DEVICE — MASTISOL ADHESIVE LIQ 2/3ML

## (undated) DEVICE — TIP COVER ACCESSORY

## (undated) DEVICE — CANNULA SEAL

## (undated) DEVICE — AGENT HEMSTAT 3GM OXIDIZED REGENERATED CELOS ABSRB FOR CONT (ORDER MULTIPLES OF 5EA)

## (undated) DEVICE — SURGICEL ENDOSCP APPL

## (undated) DEVICE — SUTURE STRATAFIX SPRL PDS + SZ 2-0 L6IN ABSRB VLT L36MM SXPP1B409

## (undated) DEVICE — PREP SKN CHLRAPRP APL 26ML STR --

## (undated) DEVICE — SET SEALS HYSTEROSCOPE DISP -- MYOSURE  EA=10

## (undated) DEVICE — PROBE 8225101 5PK STD PRASS FL TIP ROHS

## (undated) DEVICE — GLOVE ORANGE PI 8   MSG9080

## (undated) DEVICE — ELECTRO LUBE IS A SINGLE PATIENT USE DEVICE THAT IS INTENDED TO BE USED ON ELECTROSURGICAL ELECTRODES TO REDUCE STICKING.: Brand: KEY SURGICAL ELECTRO LUBE

## (undated) DEVICE — LIQUIBAND RAPID ADHESIVE 36/CS 0.8ML: Brand: MEDLINE

## (undated) DEVICE — SUTURE MCRYL SZ 4-0 L27IN ABSRB UD L19MM PS-2 1/2 CIR PRIM Y426H

## (undated) DEVICE — ENDOTRACH TUBE 8229507 CONT EMG 7MM ROHS: Brand: NIM CONTACT®

## (undated) DEVICE — SHEAR RMFG HARMONIC FOCUS 9CM -- OEM ITEM L#322125